# Patient Record
Sex: FEMALE | Race: ASIAN | Employment: OTHER | URBAN - METROPOLITAN AREA
[De-identification: names, ages, dates, MRNs, and addresses within clinical notes are randomized per-mention and may not be internally consistent; named-entity substitution may affect disease eponyms.]

---

## 2017-04-05 ENCOUNTER — APPOINTMENT (OUTPATIENT)
Dept: GENERAL RADIOLOGY | Facility: HOSPITAL | Age: 82
End: 2017-04-05
Attending: EMERGENCY MEDICINE
Payer: MEDICARE

## 2017-04-05 ENCOUNTER — HOSPITAL ENCOUNTER (OUTPATIENT)
Facility: HOSPITAL | Age: 82
Setting detail: OBSERVATION
Discharge: HOME | End: 2017-04-06
Attending: EMERGENCY MEDICINE | Admitting: HOSPITALIST
Payer: MEDICARE

## 2017-04-05 ENCOUNTER — APPOINTMENT (OUTPATIENT)
Dept: CV DIAGNOSTICS | Facility: HOSPITAL | Age: 82
End: 2017-04-05
Attending: HOSPITALIST
Payer: MEDICARE

## 2017-04-05 DIAGNOSIS — R77.8 ELEVATED TROPONIN: ICD-10-CM

## 2017-04-05 DIAGNOSIS — R55 SYNCOPE AND COLLAPSE: Primary | ICD-10-CM

## 2017-04-05 PROBLEM — R79.89 ELEVATED TROPONIN: Status: ACTIVE | Noted: 2017-04-05

## 2017-04-05 PROBLEM — Z91.81 AT RISK FOR FALLING: Status: ACTIVE | Noted: 2017-04-05

## 2017-04-05 PROCEDURE — 36415 COLL VENOUS BLD VENIPUNCTURE: CPT

## 2017-04-05 PROCEDURE — 84484 ASSAY OF TROPONIN QUANT: CPT | Performed by: HOSPITALIST

## 2017-04-05 PROCEDURE — 93306 TTE W/DOPPLER COMPLETE: CPT | Performed by: INTERNAL MEDICINE

## 2017-04-05 PROCEDURE — 99285 EMERGENCY DEPT VISIT HI MDM: CPT

## 2017-04-05 PROCEDURE — 80053 COMPREHEN METABOLIC PANEL: CPT | Performed by: EMERGENCY MEDICINE

## 2017-04-05 PROCEDURE — 84484 ASSAY OF TROPONIN QUANT: CPT | Performed by: EMERGENCY MEDICINE

## 2017-04-05 PROCEDURE — 93306 TTE W/DOPPLER COMPLETE: CPT

## 2017-04-05 PROCEDURE — 81003 URINALYSIS AUTO W/O SCOPE: CPT | Performed by: HOSPITALIST

## 2017-04-05 PROCEDURE — 71010 XR CHEST AP PORTABLE  (CPT=71010): CPT

## 2017-04-05 PROCEDURE — 93010 ELECTROCARDIOGRAM REPORT: CPT

## 2017-04-05 PROCEDURE — 85025 COMPLETE CBC W/AUTO DIFF WBC: CPT | Performed by: EMERGENCY MEDICINE

## 2017-04-05 PROCEDURE — 83735 ASSAY OF MAGNESIUM: CPT | Performed by: EMERGENCY MEDICINE

## 2017-04-05 PROCEDURE — 93005 ELECTROCARDIOGRAM TRACING: CPT

## 2017-04-05 RX ORDER — POTASSIUM CHLORIDE 20 MEQ/1
20 TABLET, EXTENDED RELEASE ORAL ONCE
Status: COMPLETED | OUTPATIENT
Start: 2017-04-05 | End: 2017-04-05

## 2017-04-05 RX ORDER — PYRIDOXINE HCL (VITAMIN B6) 50 MG
100 TABLET ORAL DAILY
COMMUNITY

## 2017-04-05 RX ORDER — POTASSIUM CHLORIDE 20 MEQ/1
40 TABLET, EXTENDED RELEASE ORAL EVERY 4 HOURS
Status: COMPLETED | OUTPATIENT
Start: 2017-04-05 | End: 2017-04-05

## 2017-04-05 RX ORDER — ONDANSETRON 2 MG/ML
4 INJECTION INTRAMUSCULAR; INTRAVENOUS EVERY 6 HOURS PRN
Status: DISCONTINUED | OUTPATIENT
Start: 2017-04-05 | End: 2017-04-06

## 2017-04-05 RX ORDER — AMLODIPINE BESYLATE 5 MG/1
5 TABLET ORAL EVERY EVENING
COMMUNITY
End: 2017-07-24

## 2017-04-05 RX ORDER — TRAMADOL HYDROCHLORIDE 50 MG/1
50 TABLET ORAL DAILY
Status: DISCONTINUED | OUTPATIENT
Start: 2017-04-05 | End: 2017-04-06

## 2017-04-05 RX ORDER — CETIRIZINE HYDROCHLORIDE 10 MG/1
5 TABLET ORAL DAILY
Status: DISCONTINUED | OUTPATIENT
Start: 2017-04-06 | End: 2017-04-06

## 2017-04-05 RX ORDER — AMLODIPINE BESYLATE 5 MG/1
5 TABLET ORAL DAILY
Status: DISCONTINUED | OUTPATIENT
Start: 2017-04-05 | End: 2017-04-06

## 2017-04-05 RX ORDER — PANTOPRAZOLE SODIUM 20 MG/1
20 TABLET, DELAYED RELEASE ORAL
Status: DISCONTINUED | OUTPATIENT
Start: 2017-04-06 | End: 2017-04-06

## 2017-04-05 RX ORDER — ACETAMINOPHEN 325 MG/1
650 TABLET ORAL EVERY 6 HOURS PRN
Status: DISCONTINUED | OUTPATIENT
Start: 2017-04-05 | End: 2017-04-06

## 2017-04-05 RX ORDER — POTASSIUM CHLORIDE 20 MEQ/1
40 TABLET, EXTENDED RELEASE ORAL ONCE
Status: DISCONTINUED | OUTPATIENT
Start: 2017-04-05 | End: 2017-04-05

## 2017-04-05 RX ORDER — ALBUTEROL SULFATE 90 UG/1
1 AEROSOL, METERED RESPIRATORY (INHALATION) EVERY 6 HOURS PRN
Status: DISCONTINUED | OUTPATIENT
Start: 2017-04-05 | End: 2017-04-06

## 2017-04-05 RX ORDER — LISINOPRIL 10 MG/1
20 TABLET ORAL DAILY
Status: DISCONTINUED | OUTPATIENT
Start: 2017-04-06 | End: 2017-04-06

## 2017-04-05 RX ORDER — LISINOPRIL 20 MG/1
20 TABLET ORAL DAILY
COMMUNITY
End: 2017-07-24

## 2017-04-05 NOTE — ED INITIAL ASSESSMENT (HPI)
Pt here with report of having a syncopal episode yesterday while sitting. Pt was caught and did not hit head.

## 2017-04-05 NOTE — ED PROVIDER NOTES
Patient Seen in: [facility] Emergency Department    History   Patient presents with:  Syncope (cardiovascular, neurologic)    Stated Complaint: syncope    HPI    The patient is an 22-year-old female with a history of multiple past medical problems, N/A 1/29/2016    Comment Procedure: CAUDAL;  Surgeon: Sukh Foy MD;  Location: [facility] FOR PAIN [facility]       Medications : AmLODIPine Besylate 5 MG Oral Tab,  Take 5 mg by mouth every evening.    Calcium Carbonate-Vitamin D 250-125 MG-UNIT lymphadenopathy. Mucus membranes moist.   Supple neck  Cardiovascular:    Regular rhythm without murmurs rubs or gallops, no peripheral edema or JVD. Radial and DP pulses 2+ bilaterally. Lungs: Speaking full sentences without any distress or retractions. Rhythm  Reading: Normal sinus rhythm with frequent PVCs, no other ectopy, normal axis, there is a QRS duration of 146 ms, with right bundle branch block, otherwise normal intervals, no pathologic ST segment elevation or depressions or T-wave inversions.   Julius Youssef Date Reviewed: 10/17/2016          ICD-10-CM Noted POA    * (Principal)Syncope and collapse R55 4/5/2017 Unknown    Elevated troponin R74.8 4/5/2017 Unknown

## 2017-04-05 NOTE — ED NOTES
Report given to floor RN Shayna, patient and son updated on bed assignment, patient awaiting transportation.

## 2017-04-05 NOTE — CONSULTS
[facility] Electrophysiology Consult      Servando Monroe is a 80year old female    She has a h/o recurrent syncope. In 2013, she and her family were considering a comprehensive workup involving cardiac MRI, EPS, but she ultimately declined due to her advnaced age. 84 , RBBB, QTc 500, PVCs  · Echo: 3/2013: EF 70  · Echo today pending  · Nuc stress: 3/2013: normal perfusion and EF     Impression:  1. Syncope  palpitations   RBBB    Plan:  · Discussed options with her and son.  Essentially, they can decide if they

## 2017-04-05 NOTE — H&P
.  CC: Patient presents with:  Syncope (cardiovascular, neurologic)       PCP: Aubrey Ojeda MD    History of Present Illness: Patient is a 80year old female with PMH sig for HTN who presented with episode of syncope while sitting yesterday with pylori) 1998     treated   • Vitamin D deficiency 2/21/2010        PSH      Past Surgical History    UPPER GI ENDOSCOPY,DIAGNOSIS  12/01    Comment wnl    COLONOSCOPY,DIAGNOSTIC  12/01    Comment wnl    INJECTION, W/WO CONTRAST, DX/THERAPEUTIC SUBSTANCE, E CL  103   CO2  30.0   BUN  19   CREATSERUM  1.00   GLU  151*   CA  8.9   MG  1.9       Recent Labs   Lab  04/05/17   1115   ALT  18   AST  15   ALB  3.3*       Recent Labs   Lab  04/05/17   1115   TROP  0.049*       Additional Diagnostics: personally rev

## 2017-04-05 NOTE — ED NOTES
Per nursing supervisor, bed assignment to be placed, previous room not available. Patient and family notified.

## 2017-04-05 NOTE — PROGRESS NOTES
[facility] Pharmacy Note:  Renal Dose Adjustment for Cetirizine (ZYRTEC)    Ritu Wells has been prescribed Cetirizine (Zyrtec) 10 mg orally daily.     CrCl estimated at 26.9 ml/min     Her calculated creatinine clearance is < 32 ml/min, therefore the dose has bee

## 2017-04-06 VITALS
WEIGHT: 95 LBS | OXYGEN SATURATION: 94 % | RESPIRATION RATE: 20 BRPM | BODY MASS INDEX: 21 KG/M2 | TEMPERATURE: 98 F | DIASTOLIC BLOOD PRESSURE: 49 MMHG | SYSTOLIC BLOOD PRESSURE: 105 MMHG | HEART RATE: 76 BPM

## 2017-04-06 PROCEDURE — 97161 PT EVAL LOW COMPLEX 20 MIN: CPT

## 2017-04-06 PROCEDURE — 84132 ASSAY OF SERUM POTASSIUM: CPT | Performed by: HOSPITALIST

## 2017-04-06 NOTE — PROGRESS NOTES
[facility] Hospitalist note    PCP: Mela Roy MD    Chief Complaint:  79 yo woman with syncope and h/o NSVT    SUBJECTIVE:  No further events, no dizziness. Ambulated and feels better.  Tolerating PO  Son reports that pt/family do not want further Pantoprazole Sodium  20 mg Oral QAM AC   • TraMADol HCl  50 mg Oral Daily        acetaminophen, ondansetron HCl, Albuterol Sulfate HFA       Assessment/Plan:     Syncope- during previous episodes there have been concerns for seizure, NSVT as causes.  Could

## 2017-04-06 NOTE — CM/SW NOTE
SW met with patient to assess for discharge planning needs. Patient is [place] speaking only, therefore her son Ruben Husain ([phone]) provided history necessary for this assessment.   Ruben Husain stated that patient lives with her daughter and son-in-law in [facility] Admission   Independent with ADLs and Mobility Yes   Services in place prior to admission DME/Supplies at home   Type of DME/Supplies Wheeled Walker;Standard Walker; Wheelchair   Discharge Needs   Anticipated D/C needs Other  (24 hour care/supervision)

## 2017-04-06 NOTE — PAYOR COMM NOTE
4/5      ED     Patient presents with:  Syncope (cardiovascular, neurologic)    Stated Complaint: syncope    HPI    The patient is an 45-year-old female with a history of multiple past medical problems, presenting with her son to the emergency department d CBC W/ DIFFERENTIAL - Abnormal; Notable for the following:     HGB 11.7 (*)     MCV 76.8 (*)     MCH 24.9 (*)     All other components within normal limits   MAGNESIUM - Normal   POTASSIUM - Normal   CBC WITH DIFFERENTIAL WITH PLATELET    Narrative:

## 2017-04-06 NOTE — CM/SW NOTE
SW received callback from patient's daughter, Anel Sales. Jacky stated the home care company patient has used in the past is [address] (P: [phone]), (F: [phone]). ARMANDO faxed referral to [facility], as they are not on [facility].

## 2017-04-06 NOTE — PHYSICAL THERAPY NOTE
PHYSICAL THERAPY QUICK EVALUATION - INPATIENT    Room Number: 3570/7869-S  Evaluation Date: 4/6/2017  Presenting Problem: syncope and collapse, mildly elevated troponin  Physician Order: PT Eval and Treat    Problem List  Principal Problem:    Syncope and walker (rollator)  Patient Regularly Uses: Glasses    Prior Level of [place]: Ambulates with rollator or RW, denies recent fall history, independent c ADLs, family assists with cooking. SUBJECTIVE  \" I am happy because I am going home. \"    OBJECT gait pattern and without LOB during turns, head turns, or changes in gait speed. Pt attempted 5 steps sans AD requiring Min A and cues. Pt recommended to utilize RW at all times which patient's son confirms that she does.   Pt completed modified rahman balanc

## 2017-04-07 NOTE — CM/SW NOTE
Pt d/c 04/06 home with [facility].        04/07/17 0900   Discharge disposition   Discharged to: Home or Self   Name of Facillity/Home Care/Hospice ([address])   Home services after discharge Skilled home care   Discharge

## 2017-04-07 NOTE — CM/SW NOTE
ARMANDO confirmed with [facility] (P#[phone]) they have accepted patient for home health, SW faxed updates (U#[phone]).

## 2017-08-07 PROBLEM — H54.40 BLIND LEFT EYE: Status: ACTIVE | Noted: 2017-08-07

## 2017-08-07 PROBLEM — E11.9 DIABETES MELLITUS TYPE 2 WITHOUT RETINOPATHY (HCC): Status: ACTIVE | Noted: 2017-08-07

## 2017-08-07 PROBLEM — H25.811 COMBINED FORMS OF AGE-RELATED CATARACT OF RIGHT EYE: Status: ACTIVE | Noted: 2017-08-07

## 2017-09-08 PROBLEM — H44.522 PHTHISIS BULBI OF LEFT EYE: Status: ACTIVE | Noted: 2017-09-08

## 2018-03-10 PROCEDURE — 82570 ASSAY OF URINE CREATININE: CPT | Performed by: INTERNAL MEDICINE

## 2018-03-10 PROCEDURE — 82043 UR ALBUMIN QUANTITATIVE: CPT | Performed by: INTERNAL MEDICINE

## 2019-07-04 ENCOUNTER — HOSPITAL ENCOUNTER (EMERGENCY)
Facility: HOSPITAL | Age: 84
Discharge: HOME | End: 2019-07-04
Attending: EMERGENCY MEDICINE
Payer: MEDICARE

## 2019-07-04 VITALS
TEMPERATURE: 97 F | DIASTOLIC BLOOD PRESSURE: 50 MMHG | SYSTOLIC BLOOD PRESSURE: 146 MMHG | HEART RATE: 72 BPM | WEIGHT: 98.13 LBS | OXYGEN SATURATION: 96 % | BODY MASS INDEX: 21.17 KG/M2 | RESPIRATION RATE: 16 BRPM | HEIGHT: 57 IN

## 2019-07-04 DIAGNOSIS — R55 SYNCOPE, NEAR: Primary | ICD-10-CM

## 2019-07-04 LAB
ALBUMIN SERPL-MCNC: 3.6 G/DL (ref 3.4–5)
ALBUMIN/GLOB SERPL: 1.3 {RATIO} (ref 1–2)
ALP LIVER SERPL-CCNC: 102 U/L (ref 55–142)
ALT SERPL-CCNC: 17 U/L (ref 13–56)
ANION GAP SERPL CALC-SCNC: 6 MMOL/L (ref 0–18)
AST SERPL-CCNC: 20 U/L (ref 15–37)
BASOPHILS # BLD AUTO: 0.04 X10(3) UL (ref 0–0.2)
BASOPHILS NFR BLD AUTO: 0.5 %
BILIRUB SERPL-MCNC: 0.9 MG/DL (ref 0.1–2)
BUN BLD-MCNC: 28 MG/DL (ref 7–18)
BUN/CREAT SERPL: 23.3 (ref 10–20)
CALCIUM BLD-MCNC: 8.7 MG/DL (ref 8.5–10.1)
CHLORIDE SERPL-SCNC: 104 MMOL/L (ref 98–112)
CO2 SERPL-SCNC: 30 MMOL/L (ref 21–32)
CREAT BLD-MCNC: 1.2 MG/DL (ref 0.55–1.02)
DEPRECATED RDW RBC AUTO: 37.7 FL (ref 35.1–46.3)
EOSINOPHIL # BLD AUTO: 0.16 X10(3) UL (ref 0–0.7)
EOSINOPHIL NFR BLD AUTO: 2.2 %
ERYTHROCYTE [DISTWIDTH] IN BLOOD BY AUTOMATED COUNT: 13.4 % (ref 11–15)
GLOBULIN PLAS-MCNC: 2.8 G/DL (ref 2.8–4.4)
GLUCOSE BLD-MCNC: 111 MG/DL (ref 70–99)
HCT VFR BLD AUTO: 37.5 % (ref 35–48)
HGB BLD-MCNC: 12 G/DL (ref 12–16)
IMM GRANULOCYTES # BLD AUTO: 0.02 X10(3) UL (ref 0–1)
IMM GRANULOCYTES NFR BLD: 0.3 %
LYMPHOCYTES # BLD AUTO: 2.15 X10(3) UL (ref 1–4)
LYMPHOCYTES NFR BLD AUTO: 29 %
M PROTEIN MFR SERPL ELPH: 6.4 G/DL (ref 6.4–8.2)
MCH RBC QN AUTO: 25 PG (ref 26–34)
MCHC RBC AUTO-ENTMCNC: 32 G/DL (ref 31–37)
MCV RBC AUTO: 78.1 FL (ref 80–100)
MONOCYTES # BLD AUTO: 0.61 X10(3) UL (ref 0.1–1)
MONOCYTES NFR BLD AUTO: 8.2 %
NEUTROPHILS # BLD AUTO: 4.43 X10 (3) UL (ref 1.5–7.7)
NEUTROPHILS # BLD AUTO: 4.43 X10(3) UL (ref 1.5–7.7)
NEUTROPHILS NFR BLD AUTO: 59.8 %
OSMOLALITY SERPL CALC.SUM OF ELEC: 296 MOSM/KG (ref 275–295)
PLATELET # BLD AUTO: 201 10(3)UL (ref 150–450)
POTASSIUM SERPL-SCNC: 3.5 MMOL/L (ref 3.5–5.1)
RBC # BLD AUTO: 4.8 X10(6)UL (ref 3.8–5.3)
SODIUM SERPL-SCNC: 140 MMOL/L (ref 136–145)
WBC # BLD AUTO: 7.4 X10(3) UL (ref 4–11)

## 2019-07-04 PROCEDURE — 85025 COMPLETE CBC W/AUTO DIFF WBC: CPT | Performed by: EMERGENCY MEDICINE

## 2019-07-04 PROCEDURE — 93010 ELECTROCARDIOGRAM REPORT: CPT

## 2019-07-04 PROCEDURE — 99284 EMERGENCY DEPT VISIT MOD MDM: CPT

## 2019-07-04 PROCEDURE — 36415 COLL VENOUS BLD VENIPUNCTURE: CPT

## 2019-07-04 PROCEDURE — 80053 COMPREHEN METABOLIC PANEL: CPT | Performed by: EMERGENCY MEDICINE

## 2019-07-04 PROCEDURE — 93005 ELECTROCARDIOGRAM TRACING: CPT

## 2019-07-04 NOTE — ED PROVIDER NOTES
Patient Seen in: [facility] Emergency Department    History   Patient presents with:  Syncope (cardiovascular, neurologic)    Stated Complaint: near syncope    HPI    Patient is a 72-year-old female who presents via EMS with her daughter at bedside complaint: near syncope  Other systems are as noted in HPI. Constitutional and vital signs reviewed. All other systems reviewed and negative except as noted above.     Physical Exam     ED Triage Vitals [07/04/19 1103]   /54   Pulse 62   Resp 16 Abnormality         Status                     ---------                               -----------         ------                     CBC W/ DIFFERENTIAL[[id number]]          Abnormal            Final result                 Please view results for these

## 2019-07-04 NOTE — ED INITIAL ASSESSMENT (HPI)
Pt aox4. Pt presents to ed per [facility] and daughter. Per pts daughter pt laid her head down and stated she was tired, pt closed her eyes and patient was responding slowly. Per daughter pt never passed out.

## 2019-07-05 LAB
ATRIAL RATE: 64 BPM
P AXIS: 61 DEGREES
P-R INTERVAL: 212 MS
Q-T INTERVAL: 456 MS
QRS DURATION: 140 MS
QTC CALCULATION (BEZET): 470 MS
R AXIS: 72 DEGREES
T AXIS: 0 DEGREES
VENTRICULAR RATE: 64 BPM

## 2019-07-31 ENCOUNTER — HOSPITAL ENCOUNTER (EMERGENCY)
Facility: HOSPITAL | Age: 84
Discharge: HOME | End: 2019-07-31
Attending: EMERGENCY MEDICINE
Payer: MEDICARE

## 2019-07-31 VITALS
OXYGEN SATURATION: 100 % | SYSTOLIC BLOOD PRESSURE: 150 MMHG | HEIGHT: 57 IN | RESPIRATION RATE: 18 BRPM | HEART RATE: 60 BPM | TEMPERATURE: 98 F | BODY MASS INDEX: 21.17 KG/M2 | WEIGHT: 98.13 LBS | DIASTOLIC BLOOD PRESSURE: 57 MMHG

## 2019-07-31 DIAGNOSIS — I10 ESSENTIAL HYPERTENSION: Primary | ICD-10-CM

## 2019-07-31 LAB
ALBUMIN SERPL-MCNC: 3.9 G/DL (ref 3.4–5)
ALBUMIN/GLOB SERPL: 1.1 {RATIO} (ref 1–2)
ALP LIVER SERPL-CCNC: 123 U/L (ref 55–142)
ALT SERPL-CCNC: 19 U/L (ref 13–56)
ANION GAP SERPL CALC-SCNC: 4 MMOL/L (ref 0–18)
AST SERPL-CCNC: 21 U/L (ref 15–37)
BILIRUB SERPL-MCNC: 0.8 MG/DL (ref 0.1–2)
BUN BLD-MCNC: 16 MG/DL (ref 7–18)
BUN/CREAT SERPL: 17.4 (ref 10–20)
CALCIUM BLD-MCNC: 10 MG/DL (ref 8.5–10.1)
CHLORIDE SERPL-SCNC: 102 MMOL/L (ref 98–112)
CO2 SERPL-SCNC: 32 MMOL/L (ref 21–32)
CREAT BLD-MCNC: 0.92 MG/DL (ref 0.55–1.02)
GLOBULIN PLAS-MCNC: 3.5 G/DL (ref 2.8–4.4)
GLUCOSE BLD-MCNC: 90 MG/DL (ref 70–99)
M PROTEIN MFR SERPL ELPH: 7.4 G/DL (ref 6.4–8.2)
OSMOLALITY SERPL CALC.SUM OF ELEC: 287 MOSM/KG (ref 275–295)
POTASSIUM SERPL-SCNC: 4 MMOL/L (ref 3.5–5.1)
SODIUM SERPL-SCNC: 138 MMOL/L (ref 136–145)

## 2019-07-31 PROCEDURE — 36415 COLL VENOUS BLD VENIPUNCTURE: CPT

## 2019-07-31 PROCEDURE — 99283 EMERGENCY DEPT VISIT LOW MDM: CPT

## 2019-07-31 PROCEDURE — 80053 COMPREHEN METABOLIC PANEL: CPT | Performed by: EMERGENCY MEDICINE

## 2019-07-31 PROCEDURE — 99284 EMERGENCY DEPT VISIT MOD MDM: CPT

## 2019-07-31 RX ORDER — CLONIDINE HYDROCHLORIDE 0.1 MG/1
0.1 TABLET ORAL 2 TIMES DAILY
Qty: 60 TABLET | Refills: 0 | Status: SHIPPED | OUTPATIENT
Start: 2019-07-31 | End: 2019-08-05

## 2019-07-31 RX ORDER — CLONIDINE HYDROCHLORIDE 0.1 MG/1
0.1 TABLET ORAL ONCE
Status: COMPLETED | OUTPATIENT
Start: 2019-07-31 | End: 2019-07-31

## 2019-08-01 NOTE — ED PROVIDER NOTES
Patient Seen in: [facility] Emergency Department    History   Patient presents with:  Hypertension (cardiovascular)    Stated Complaint: hypertension from IC    HPI    Patient is a 55-year-old female who presents from the immediate care for high blo 07/31/19 1759 68   Resp 07/31/19 1809 20   Temp 07/31/19 1809 97.9 °F (36.6 °C)   Temp src --    SpO2 07/31/19 1759 98 %   O2 Device 07/31/19 1809 None (Room air)       Current:/57   Pulse 60   Temp 97.9 °F (36.6 °C)   Resp 18   Ht 144.8 cm (4' 9\") days          Medications Prescribed:  Discharge Medication List as of 7/31/2019  7:23 PM    START taking these medications    cloNIDine HCl 0.1 MG Oral Tab  Take 1 tablet (0.1 mg total) by mouth 2 (two) times daily. , Normal, Disp-60 tablet, R-0

## 2019-08-05 PROBLEM — K86.89 PANCREATIC MASS: Status: ACTIVE | Noted: 2019-07-02

## 2019-08-05 PROBLEM — K86.89 PANCREATIC MASS (HCC): Status: ACTIVE | Noted: 2019-07-02

## 2019-08-05 PROBLEM — R19.00 PELVIC MASS: Status: ACTIVE | Noted: 2019-07-02

## 2019-08-10 ENCOUNTER — APPOINTMENT (OUTPATIENT)
Dept: CT IMAGING | Facility: HOSPITAL | Age: 84
End: 2019-08-10
Attending: EMERGENCY MEDICINE
Payer: MEDICARE

## 2019-08-10 ENCOUNTER — APPOINTMENT (OUTPATIENT)
Dept: GENERAL RADIOLOGY | Facility: HOSPITAL | Age: 84
End: 2019-08-10
Attending: EMERGENCY MEDICINE
Payer: MEDICARE

## 2019-08-10 ENCOUNTER — HOSPITAL ENCOUNTER (OUTPATIENT)
Facility: HOSPITAL | Age: 84
Setting detail: OBSERVATION
Discharge: HOME HEALTH | End: 2019-08-13
Attending: EMERGENCY MEDICINE | Admitting: INTERNAL MEDICINE
Payer: MEDICARE

## 2019-08-10 DIAGNOSIS — R77.8 ELEVATED TROPONIN: ICD-10-CM

## 2019-08-10 DIAGNOSIS — R53.1 WEAKNESS GENERALIZED: Primary | ICD-10-CM

## 2019-08-10 PROBLEM — R79.89 AZOTEMIA: Status: ACTIVE | Noted: 2019-08-10

## 2019-08-10 PROBLEM — R73.9 HYPERGLYCEMIA: Status: ACTIVE | Noted: 2019-08-10

## 2019-08-10 LAB
ALBUMIN SERPL-MCNC: 4.1 G/DL (ref 3.4–5)
ALBUMIN/GLOB SERPL: 1.2 {RATIO} (ref 1–2)
ALP LIVER SERPL-CCNC: 112 U/L (ref 55–142)
ALT SERPL-CCNC: 20 U/L (ref 13–56)
ANION GAP SERPL CALC-SCNC: 5 MMOL/L (ref 0–18)
AST SERPL-CCNC: 19 U/L (ref 15–37)
BILIRUB SERPL-MCNC: 1.1 MG/DL (ref 0.1–2)
BILIRUB UR QL STRIP.AUTO: NEGATIVE
BUN BLD-MCNC: 20 MG/DL (ref 7–18)
BUN/CREAT SERPL: 22.7 (ref 10–20)
CALCIUM BLD-MCNC: 9.3 MG/DL (ref 8.5–10.1)
CHLORIDE SERPL-SCNC: 105 MMOL/L (ref 98–112)
CLARITY UR REFRACT.AUTO: CLEAR
CO2 SERPL-SCNC: 29 MMOL/L (ref 21–32)
COLOR UR AUTO: YELLOW
CREAT BLD-MCNC: 0.88 MG/DL (ref 0.55–1.02)
GLOBULIN PLAS-MCNC: 3.3 G/DL (ref 2.8–4.4)
GLUCOSE BLD-MCNC: 116 MG/DL (ref 70–99)
GLUCOSE BLD-MCNC: 118 MG/DL (ref 70–99)
GLUCOSE UR STRIP.AUTO-MCNC: NEGATIVE MG/DL
KETONES UR STRIP.AUTO-MCNC: NEGATIVE MG/DL
LACTATE SERPL-SCNC: 0.8 MMOL/L (ref 0.4–2)
LEUKOCYTE ESTERASE UR QL STRIP.AUTO: NEGATIVE
M PROTEIN MFR SERPL ELPH: 7.4 G/DL (ref 6.4–8.2)
NITRITE UR QL STRIP.AUTO: NEGATIVE
OSMOLALITY SERPL CALC.SUM OF ELEC: 292 MOSM/KG (ref 275–295)
PH UR STRIP.AUTO: 8 [PH] (ref 4.5–8)
POTASSIUM SERPL-SCNC: 4 MMOL/L (ref 3.5–5.1)
PROCALCITONIN SERPL-MCNC: <0.02 NG/ML
PROT UR STRIP.AUTO-MCNC: 30 MG/DL
RBC UR QL AUTO: NEGATIVE
SODIUM SERPL-SCNC: 139 MMOL/L (ref 136–145)
SP GR UR STRIP.AUTO: 1.01 (ref 1–1.03)
TROPONIN I SERPL-MCNC: 0.61 NG/ML (ref ?–0.04)
UROBILINOGEN UR STRIP.AUTO-MCNC: <2 MG/DL

## 2019-08-10 PROCEDURE — 83605 ASSAY OF LACTIC ACID: CPT | Performed by: EMERGENCY MEDICINE

## 2019-08-10 PROCEDURE — 93010 ELECTROCARDIOGRAM REPORT: CPT

## 2019-08-10 PROCEDURE — 96374 THER/PROPH/DIAG INJ IV PUSH: CPT

## 2019-08-10 PROCEDURE — 80053 COMPREHEN METABOLIC PANEL: CPT | Performed by: EMERGENCY MEDICINE

## 2019-08-10 PROCEDURE — 82962 GLUCOSE BLOOD TEST: CPT

## 2019-08-10 PROCEDURE — 70450 CT HEAD/BRAIN W/O DYE: CPT | Performed by: EMERGENCY MEDICINE

## 2019-08-10 PROCEDURE — 36415 COLL VENOUS BLD VENIPUNCTURE: CPT

## 2019-08-10 PROCEDURE — 93005 ELECTROCARDIOGRAM TRACING: CPT

## 2019-08-10 PROCEDURE — 84145 PROCALCITONIN (PCT): CPT | Performed by: EMERGENCY MEDICINE

## 2019-08-10 PROCEDURE — 96361 HYDRATE IV INFUSION ADD-ON: CPT

## 2019-08-10 PROCEDURE — 87040 BLOOD CULTURE FOR BACTERIA: CPT | Performed by: EMERGENCY MEDICINE

## 2019-08-10 PROCEDURE — 71045 X-RAY EXAM CHEST 1 VIEW: CPT | Performed by: EMERGENCY MEDICINE

## 2019-08-10 PROCEDURE — 81001 URINALYSIS AUTO W/SCOPE: CPT | Performed by: EMERGENCY MEDICINE

## 2019-08-10 PROCEDURE — 99285 EMERGENCY DEPT VISIT HI MDM: CPT

## 2019-08-10 PROCEDURE — 84484 ASSAY OF TROPONIN QUANT: CPT | Performed by: EMERGENCY MEDICINE

## 2019-08-10 RX ORDER — HYDRALAZINE HYDROCHLORIDE 20 MG/ML
5 INJECTION INTRAMUSCULAR; INTRAVENOUS ONCE
Status: COMPLETED | OUTPATIENT
Start: 2019-08-10 | End: 2019-08-10

## 2019-08-10 RX ORDER — METOPROLOL SUCCINATE 25 MG/1
25 TABLET, EXTENDED RELEASE ORAL
Status: DISCONTINUED | OUTPATIENT
Start: 2019-08-11 | End: 2019-08-11

## 2019-08-10 RX ORDER — ASPIRIN 81 MG/1
81 TABLET, CHEWABLE ORAL DAILY
Status: DISCONTINUED | OUTPATIENT
Start: 2019-08-11 | End: 2019-08-13

## 2019-08-10 RX ORDER — ACETAMINOPHEN 325 MG/1
650 TABLET ORAL EVERY 6 HOURS PRN
Status: DISCONTINUED | OUTPATIENT
Start: 2019-08-10 | End: 2019-08-13

## 2019-08-10 RX ORDER — ALBUTEROL SULFATE 90 UG/1
1 AEROSOL, METERED RESPIRATORY (INHALATION) EVERY 6 HOURS PRN
Status: DISCONTINUED | OUTPATIENT
Start: 2019-08-10 | End: 2019-08-13

## 2019-08-10 RX ORDER — LISINOPRIL 10 MG/1
10 TABLET ORAL 2 TIMES DAILY
Status: DISCONTINUED | OUTPATIENT
Start: 2019-08-10 | End: 2019-08-13

## 2019-08-10 RX ORDER — PANTOPRAZOLE SODIUM 20 MG/1
20 TABLET, DELAYED RELEASE ORAL
Status: DISCONTINUED | OUTPATIENT
Start: 2019-08-11 | End: 2019-08-13

## 2019-08-10 RX ORDER — LABETALOL HYDROCHLORIDE 5 MG/ML
20 INJECTION, SOLUTION INTRAVENOUS EVERY 4 HOURS PRN
Status: DISCONTINUED | OUTPATIENT
Start: 2019-08-10 | End: 2019-08-11

## 2019-08-10 NOTE — ED INITIAL ASSESSMENT (HPI)
HTN, lethargy today. Seems out of it per son and daughter. Recently Tapered clonodine and start hydralazine. Started new medication last night. Up all night last night. Called son this am and stated she couldn't get up. bp 179/75.   Nephrologist master

## 2019-08-10 NOTE — ED PROVIDER NOTES
Patient Seen in: [facility] Emergency Department    History   Patient presents with:  Hypertension (cardiovascular)    Stated Complaint: HTN    HPI    15-year-old female who was brought to the emergency room because of difficulty with generalized we COLONOSCOPY,DIAGNOSTIC  12/01    wnl   • UPPER GI ENDOSCOPY,DIAGNOSIS  12/01    wnl                    Social History    Tobacco Use      Smoking status: Never Smoker      Smokeless tobacco: Never Used    Alcohol use: No    Drug use: Never             Revi All other components within normal limits   LACTIC ACID, PLASMA - Normal   PROCALCITONIN - Normal    Narrative:     Resulted by: batch: K, CO2, CL, NA, ALB, TP, TBIL, ALT, AST, ALP, CA, CREA, BUN, GLUC,           Resulted by: [id number]: CTNI,    CBC WITH work-up for her generalized weakness and elevated troponin.   CT brain is pending at this time              Disposition and Plan     Clinical Impression:  Weakness generalized  (primary encounter diagnosis)  Elevated troponin    Disposition:  There is no di

## 2019-08-11 ENCOUNTER — APPOINTMENT (OUTPATIENT)
Dept: CV DIAGNOSTICS | Facility: HOSPITAL | Age: 84
End: 2019-08-11
Attending: INTERNAL MEDICINE
Payer: MEDICARE

## 2019-08-11 ENCOUNTER — APPOINTMENT (OUTPATIENT)
Dept: GENERAL RADIOLOGY | Facility: HOSPITAL | Age: 84
End: 2019-08-11
Attending: INTERNAL MEDICINE
Payer: MEDICARE

## 2019-08-11 LAB
ANION GAP SERPL CALC-SCNC: 5 MMOL/L (ref 0–18)
BUN BLD-MCNC: 18 MG/DL (ref 7–18)
BUN/CREAT SERPL: 22.8 (ref 10–20)
CALCIUM BLD-MCNC: 8.3 MG/DL (ref 8.5–10.1)
CHLORIDE SERPL-SCNC: 108 MMOL/L (ref 98–112)
CO2 SERPL-SCNC: 27 MMOL/L (ref 21–32)
CREAT BLD-MCNC: 0.79 MG/DL (ref 0.55–1.02)
DEPRECATED RDW RBC AUTO: 38.3 FL (ref 35.1–46.3)
ERYTHROCYTE [DISTWIDTH] IN BLOOD BY AUTOMATED COUNT: 13.6 % (ref 11–15)
GLUCOSE BLD-MCNC: 112 MG/DL (ref 70–99)
HAV IGM SER QL: 2.1 MG/DL (ref 1.6–2.6)
HCT VFR BLD AUTO: 40 % (ref 35–48)
HGB BLD-MCNC: 12.8 G/DL (ref 12–16)
MCH RBC QN AUTO: 24.7 PG (ref 26–34)
MCHC RBC AUTO-ENTMCNC: 32 G/DL (ref 31–37)
MCV RBC AUTO: 77.2 FL (ref 80–100)
NT-PROBNP SERPL-MCNC: 2902 PG/ML (ref ?–450)
OSMOLALITY SERPL CALC.SUM OF ELEC: 293 MOSM/KG (ref 275–295)
PLATELET # BLD AUTO: 204 10(3)UL (ref 150–450)
POTASSIUM SERPL-SCNC: 3.5 MMOL/L (ref 3.5–5.1)
POTASSIUM SERPL-SCNC: 4.2 MMOL/L (ref 3.5–5.1)
RBC # BLD AUTO: 5.18 X10(6)UL (ref 3.8–5.3)
SODIUM SERPL-SCNC: 140 MMOL/L (ref 136–145)
TROPONIN I SERPL-MCNC: 2.29 NG/ML (ref ?–0.04)
TROPONIN I SERPL-MCNC: 2.7 NG/ML (ref ?–0.04)
WBC # BLD AUTO: 8.4 X10(3) UL (ref 4–11)

## 2019-08-11 PROCEDURE — 84132 ASSAY OF SERUM POTASSIUM: CPT | Performed by: INTERNAL MEDICINE

## 2019-08-11 PROCEDURE — 93005 ELECTROCARDIOGRAM TRACING: CPT

## 2019-08-11 PROCEDURE — 93306 TTE W/DOPPLER COMPLETE: CPT | Performed by: INTERNAL MEDICINE

## 2019-08-11 PROCEDURE — 83735 ASSAY OF MAGNESIUM: CPT | Performed by: INTERNAL MEDICINE

## 2019-08-11 PROCEDURE — 84484 ASSAY OF TROPONIN QUANT: CPT | Performed by: INTERNAL MEDICINE

## 2019-08-11 PROCEDURE — 85027 COMPLETE CBC AUTOMATED: CPT | Performed by: INTERNAL MEDICINE

## 2019-08-11 PROCEDURE — 73630 X-RAY EXAM OF FOOT: CPT | Performed by: INTERNAL MEDICINE

## 2019-08-11 PROCEDURE — 80048 BASIC METABOLIC PNL TOTAL CA: CPT | Performed by: INTERNAL MEDICINE

## 2019-08-11 PROCEDURE — 83880 ASSAY OF NATRIURETIC PEPTIDE: CPT | Performed by: INTERNAL MEDICINE

## 2019-08-11 PROCEDURE — 93010 ELECTROCARDIOGRAM REPORT: CPT | Performed by: INTERNAL MEDICINE

## 2019-08-11 RX ORDER — HYDRALAZINE HYDROCHLORIDE 25 MG/1
25 TABLET, FILM COATED ORAL 3 TIMES DAILY PRN
Status: DISCONTINUED | OUTPATIENT
Start: 2019-08-11 | End: 2019-08-13

## 2019-08-11 RX ORDER — CARVEDILOL 6.25 MG/1
6.25 TABLET ORAL 2 TIMES DAILY WITH MEALS
Status: DISCONTINUED | OUTPATIENT
Start: 2019-08-11 | End: 2019-08-11

## 2019-08-11 RX ORDER — CARVEDILOL 12.5 MG/1
12.5 TABLET ORAL 2 TIMES DAILY WITH MEALS
Status: DISCONTINUED | OUTPATIENT
Start: 2019-08-11 | End: 2019-08-12

## 2019-08-11 RX ORDER — POTASSIUM CHLORIDE 20 MEQ/1
40 TABLET, EXTENDED RELEASE ORAL EVERY 4 HOURS
Status: COMPLETED | OUTPATIENT
Start: 2019-08-11 | End: 2019-08-11

## 2019-08-11 RX ORDER — CLOPIDOGREL BISULFATE 75 MG/1
75 TABLET ORAL DAILY
Status: DISCONTINUED | OUTPATIENT
Start: 2019-08-11 | End: 2019-08-13

## 2019-08-11 NOTE — CONSULTS
[facility]  Report of Consultation    Ruben Marina Patient Status:  Observation    6/15/1929 MRN [id number]   [facility] 7NE-A Attending Benedicto Mckeon MD   Hosp Day # 0 PCP Rhona Burton MD     Reason for Consultation:  +tr continued ASA, added Plavix. BP control will be key. HTN-pt is newly est with nephrology for HTN management and will consult them to assist in BP control while inpatient. Weakness, presyncope-has been recurrent throughout the years.   Interesting larissa diaphoresis. EKGs showed possible diffuse ischemia with less than 1mm ST depression but no ST elevation. Repeat troponin 2.2 so I was consulted.     History:  Past Medical History:   Diagnosis Date   • Arrhythmia     palpitations   • Asthma    • Benign ne (Toprol XL) 24 hr tab 25 mg, 25 mg, Oral, Daily Beta Blocker  •  Pantoprazole Sodium (PROTONIX) EC tab 20 mg, 20 mg, Oral, QAM AC    Review of Systems:  All systems were reviewed and are negative except as described above in HPI.     Physical Exam:  Temp: Alistair Karimi MD  8/11/2019  9:59 AM

## 2019-08-11 NOTE — PLAN OF CARE
Patient awake, alert and oriented x4  [place] speaking;  services offered; patient declines, prefers to use family at bedside; currently son at bedside  Telemetry, Sinus Rhythm, 1AVB, BBB; frequent PVCs  C/o generalized weakness/fatigue  C/o

## 2019-08-11 NOTE — PLAN OF CARE
NURSING ADMISSION NOTE      Patient admitted via cart @ [phone] 75. Oriented to room. Safety precautions initiated. Bed in low position. Call light in reach. Patient A&Ox4. [place] speaking, son at bedside to translate. Denies pain.   BP still elev

## 2019-08-11 NOTE — CONSULTS
[facility] - Nephrology  Report of Consultation    Elba Villasenor Patient Status:  Observation    6/15/1929 MRN [id number]   [facility] 7NE-A Attending Mary Blevins MD   Hosp Day # 0 PCP Roberta Burris MD     Reason for UPPER GI ENDOSCOPY,DIAGNOSIS  12/01    wnl     Family History   Problem Relation Age of Onset   • Heart Disorder Mother         heart failure   • Cancer Sister         colon cancer age 80      reports that she has never smoked.  She has never used smokeless ) Disp: 90 tablet Rfl: 3 8/10/2019 at Unknown time   ADVAIR DISKUS 500-50 MCG/DOSE Inhalation Aerosol Powder, Breath Activated INHALE 1 PUFF INTO THE LUNGS TWICE DAILY Disp: 180 each Rfl: 1 8/10/2019 at Unknown time   METOPROLOL SUCCINATE ER 25 MG Oral Tab ml     Wt Readings from Last 3 Encounters:  08/10/19 : 95 lb 14.4 oz (43.5 kg)  08/09/19 : 96 lb (43.5 kg)  08/05/19 : 96 lb (43.5 kg)      General: pleasant, well appearing  HEENT: NCAT  Neck: Supple  Cardiac: Regular rate and rhythm, no murmurs  Lungs: C UROBILINOGEN <2.0 08/10/2019    NITRITE Negative 08/10/2019    LEUUR Negative 08/10/2019    NMIC Microscopic not indicated 04/05/2017    WBCUR 1-4 08/10/2019    RBCUR 0-2 08/10/2019    EPIUR None Seen 08/10/2019    BACUR None Seen 08/10/2019         ASSESS

## 2019-08-11 NOTE — H&P
[facility] Hospitalist History and Physical      Patient presents with:  Hypertension (cardiovascular)       PCP: Dick Otero MD      History of Present Illness: Patient is a 80year old female with PMH sig for asthma, chest pain, IBS, ho recurrent syn Fam Hx  Family History   Problem Relation Age of Onset   • Heart Disorder Mother         heart failure   • Cancer Sister         colon cancer age 80       Review of Systems  Comprehensive ROS reviewed and negative except for what is stated in HPI. Radiology: Ct Brain Or Head (90312)    Result Date: 8/10/2019  PROCEDURE:  CT BRAIN OR HEAD (55639)  COMPARISON:  [facility] BRAIN W/O CONTRAST, 3/03/2013, 10:55. INDICATIONS:  HTN  TECHNIQUE:  Noncontrast CT scanning is performed through the brain.  Jaja Fraga pneumothorax. Scoliosis. Dictated by: Lala Mathur MD on 8/10/2019 at 19:51     Approved by: Lala Mathur MD               Assessment/Plan:     Weakness  - unclear etiology. No infectious source identified.   Seems to be related to weaning off clon

## 2019-08-12 LAB
ATRIAL RATE: 75 BPM
ATRIAL RATE: 86 BPM
P AXIS: 58 DEGREES
P AXIS: 69 DEGREES
P-R INTERVAL: 200 MS
P-R INTERVAL: 218 MS
Q-T INTERVAL: 422 MS
Q-T INTERVAL: 432 MS
QRS DURATION: 128 MS
QRS DURATION: 132 MS
QTC CALCULATION (BEZET): 482 MS
QTC CALCULATION (BEZET): 504 MS
R AXIS: 74 DEGREES
R AXIS: 77 DEGREES
T AXIS: 5 DEGREES
T AXIS: 7 DEGREES
VENTRICULAR RATE: 75 BPM
VENTRICULAR RATE: 86 BPM

## 2019-08-12 PROCEDURE — 97116 GAIT TRAINING THERAPY: CPT

## 2019-08-12 PROCEDURE — 97161 PT EVAL LOW COMPLEX 20 MIN: CPT

## 2019-08-12 PROCEDURE — 97165 OT EVAL LOW COMPLEX 30 MIN: CPT

## 2019-08-12 PROCEDURE — 97535 SELF CARE MNGMENT TRAINING: CPT

## 2019-08-12 RX ORDER — CARVEDILOL 12.5 MG/1
25 TABLET ORAL 2 TIMES DAILY WITH MEALS
Status: DISCONTINUED | OUTPATIENT
Start: 2019-08-12 | End: 2019-08-12

## 2019-08-12 RX ORDER — CARVEDILOL 12.5 MG/1
25 TABLET ORAL 2 TIMES DAILY WITH MEALS
Status: DISCONTINUED | OUTPATIENT
Start: 2019-08-12 | End: 2019-08-13

## 2019-08-12 NOTE — PHYSICAL THERAPY NOTE
PHYSICAL THERAPY EVALUATION - INPATIENT     Room Number: 4741/6786-N  Evaluation Date: 8/12/2019  Type of Evaluation: Initial  Physician Order: PT Eval and Treat    Presenting Problem: generalized weakness  Reason for Therapy: Mobility Dysfunction an Daughter  Drives: No  Patient Owned Equipment: Rolling walker       Prior Level of [place]: Pt speaks [place] or [place]. Pt's son present to answer questions. Pt live with her daughter in [place] in a two level home.  There is one step to enter an a railing?: A Lot       AM-PAC Score:  Raw Score: 21   Approx Degree of Impairment: 28.97%   Standardized Score (AM-PAC Scale): 50.25   CMS Modifier (G-Code): CJ    FUNCTIONAL ABILITY STATUS  Gait Assessment   Gait Assistance:  Moderate assistance  Distance pain.  In this PT evaluation, the patient presents with the following impairments decreased endurance, left foot pain, decreased balance, and decreased overall functional mobility.   Functional outcome measures completed include The AM-PAC '6-Clicks' Inpati

## 2019-08-12 NOTE — PROGRESS NOTES
[facility]    Nephrology Progress Note    Ruben Gray Attending:  Benedicto Mckeon MD       SUBJECTIVE:     Anxious and poor sleep   BPs still elevated  Denies urinary complaints    PHYSICAL EXAM:     Vital Signs: BP (!) 166/50 (BP Location: Right ar 0.5 07/04/2019    NE 4.43 07/04/2019    LYMABS 2.15 07/04/2019    MOABSO 0.61 07/04/2019    EOABSO 0.16 07/04/2019    BAABSO 0.04 07/04/2019     Lab Results   Component Value Date    MALBP 18.50 03/10/2018    CREUR 106.00 03/10/2018     Lab Results   [place] check orthostatics    Elevated troponin:  -- per cardiology    D/w nursing. Thank you for allowing me to participate in the care of this patient. Please do not hesitate to call with questions or concerns. Lavone Andry Arroyo, [facility]

## 2019-08-12 NOTE — OCCUPATIONAL THERAPY NOTE
OCCUPATIONAL THERAPY EVALUATION - INPATIENT     Room Number: 6818/7197-C  Evaluation Date: 8/12/2019  Type of Evaluation: Initial  Presenting Problem: HTN, elevated troponin    Physician Order: IP Consult to Occupational Therapy  Reason for Therapy: ADL/IA day. Independent with ADL, including showering. Daughter prepares food for the pt. Uses RW or rollator. Family is trying to hire a caregiver who speaks [place] or [place]. Pt speaks both languages. 3-4 days ago, noticed increased weakness.      Oliver Waldron Score:   Score: 20  Approx Degree of Impairment: 38.32%  Standardized Score (AM-PAC Scale): 42.03  CMS Modifier (G-Code): CJ    FUNCTIONAL TRANSFER ASSESSMENT  Supine to Sit : Minimum assistance  Sit to Stand: Contact guard assist    Skilled Therapy Provide therapy records    Specific performance deficits impacting engagement in ADL/IADL LOW  1 - 3 performance deficits    Client Assessment/Performance Deficits MODERATE - Comorbidities and min to mod modifications of tasks    Clinical Decision Making LOW - Isabella Faith

## 2019-08-12 NOTE — PROGRESS NOTES
[facility]  Cardiology Progress Note    Jayashreeanjelica Millerlesli Patient Status:  Observation    6/15/1929 MRN [id number]   [facility] 7NE-A Attending Marthenia Goodell, MD   Hosp Day # 0 PCP Dary Franz MD     Assessment:  +troponin- Av.2 °F (36.8 °C), Min:97.9 °F (36.6 °C), Max:98.7 °F (37.1 °C)      Intake/Output:    Intake/Output Summary (Last 24 hours) at 2019 1539  Last data filed at 2019 0900  Gross per 24 hour   Intake 240 ml   Output 1300 ml   Net -1060 ml     Las

## 2019-08-12 NOTE — PLAN OF CARE
Assumed care at 0730  A&Ox4, VSS, NSR with frequent PVCs on tele  Orthostatics completed; see POC note  BP better controlled  Pt reports pain improving to left toes  Ambulating in halls with SB assist  Pt and family updated on POC.  Needs attended to, will

## 2019-08-12 NOTE — HOME CARE LIAISON
MET WITH PTNT AND OFFERED CHOICE  OF AGENCIES. PTNT AGREEABLE TO [facility]. MET WITH PTNT TO DISCUSS HOME HEALTH SERVICES AND COVERAGE CRITERIA. PTNT AGREEABLE TO [facility]. PTNT GIVEN RESIDENTIAL BROCHURE.  RESIDENTIAL WITH PROVIDE SN/PT ON DISC

## 2019-08-12 NOTE — CM/SW NOTE
Pt is a 80 female admitted for generalized weakness. Pt is  and has 5 children. Pt lives with her dtr [place]. Spoke with both her son Rudolph Badillo and dtr Jacky who said pt has been independent for her adls at home.   Pt is from [place] and does not speak [place]

## 2019-08-12 NOTE — PROGRESS NOTES
[facility] Hospitalist Progress Note                                                                   [address]  6/15/1929    SUBJECTIVE:  No acute events.      OBJECTIVE:  Temp:  [97.9 °F (36.6 currently denies cp  - cont asa/BB and plavix added per cards  - ECHO without wall motion abnormalities, EF 65-70. Grade 1 DD  - medical management vs cath- to be determined    Foot pain  - xray neg for fracture. No edema/erythema.             Rosalva Badillo

## 2019-08-12 NOTE — PROGRESS NOTES
08/12/19 1023 08/12/19 1028 08/12/19 1031   Vital Signs   Pulse 83 87 94   Resp 21 23 25   BP (!) 173/60 (!) 171/61 (!) 166/62   BP Location Right arm Right arm Right arm   BP Method Automatic Automatic Automatic   Patient Position Lying Sitting Standin

## 2019-08-13 VITALS
WEIGHT: 95.88 LBS | RESPIRATION RATE: 18 BRPM | OXYGEN SATURATION: 96 % | HEIGHT: 55 IN | HEART RATE: 57 BPM | SYSTOLIC BLOOD PRESSURE: 107 MMHG | BODY MASS INDEX: 22.19 KG/M2 | DIASTOLIC BLOOD PRESSURE: 43 MMHG | TEMPERATURE: 98 F

## 2019-08-13 RX ORDER — HYDRALAZINE HYDROCHLORIDE 25 MG/1
25 TABLET, FILM COATED ORAL 3 TIMES DAILY PRN
Qty: 30 TABLET | Refills: 0 | Status: SHIPPED | OUTPATIENT
Start: 2019-08-13 | End: 2019-08-20

## 2019-08-13 RX ORDER — CARVEDILOL 25 MG/1
25 TABLET ORAL 2 TIMES DAILY WITH MEALS
Qty: 60 TABLET | Refills: 0 | Status: SHIPPED | OUTPATIENT
Start: 2019-08-13 | End: 2019-08-22

## 2019-08-13 RX ORDER — LISINOPRIL 10 MG/1
10 TABLET ORAL 2 TIMES DAILY
Qty: 60 TABLET | Refills: 0 | Status: SHIPPED | OUTPATIENT
Start: 2019-08-13 | End: 2019-08-22

## 2019-08-13 NOTE — PLAN OF CARE
NURSING DISCHARGE NOTE    Discharged Home with [place] via Wheelchair. Accompanied by Family member  Belongings Taken by patient/family.   PIV and tele dc'd  Medication changes, follow up appointments, and DC instructions reviewed  Questions and concerns ad

## 2019-08-13 NOTE — PROGRESS NOTES
[facility]    Nephrology Progress Note    John Judge Attending:  Mazin Post MD       SUBJECTIVE:     No orthostatic symptoms  Eating ok  No urinary complaints    PHYSICAL EXAM:     Vital Signs: /50 (BP Location: Right arm)   Pulse 58 NE 4.43 07/04/2019    LYMABS 2.15 07/04/2019    MOABSO 0.61 07/04/2019    EOABSO 0.16 07/04/2019    BAABSO 0.04 07/04/2019     Lab Results   Component Value Date    MALBP 18.50 03/10/2018    CREUR 106.00 03/10/2018     Lab Results   Component Value Date nursing. Ok to discharge from nephrology standpoint and follow up with her nephrologist, Dr. Halina Mckeon in a few weeks for BP check.           Thank you for allowing me to participate in the care of this patient.  Please do not hesitate to call with questions o

## 2019-08-13 NOTE — DISCHARGE SUMMARY
General Medicine Discharge Summary     Patient ID:  Aury Gary  80year old  6/15/1929    Admit date: 8/10/2019    Discharge date and time:  8/13/19    Attending Physician: Fifi Alba MD be related to weaning off clonidine, perhaps from rebound HTN or side effect of medication itself  - doing better. Eating well.  Working well with PT.   - no focal s/s of infection  - CT head OK     Elevated troponin- felt to be demand 2/2 malignant HTN per

## 2019-08-13 NOTE — PROGRESS NOTES
08/13/19 1604   Clinical Encounter Type   Visited With Patient and family together  (Gave family POLST information. Adriane Vernon they wanted to read it. I answered a number of questions. Will connect with nurse.  Nurse to call  if they decide to fill it

## 2019-08-13 NOTE — PROGRESS NOTES
08/13/19 7898   Clinical Encounter Type   Visited With Patient and family together  (Family present in room during POLST and POA. Explained everything to patient. Copies of POA given to family.  Awaiting physician signature on POLST )   Routine Visit Az Brown

## 2019-08-13 NOTE — PROGRESS NOTES
[facility]  Cardiology Progress Note    Jessica Olmos Patient Status:  Observation    6/15/1929 MRN [id number]   [facility] 7NE-A Attending Marthenia Goodell, MD   Hosp Day # 0 PCP Dary Franz MD     Assessment:  +troponin- Pantoprazole Sodium  20 mg Oral QAM AC           Recent Labs     08/11/19  0545   WBC 8.4   HGB 12.8   .0     Recent Labs     08/10/19  1812 08/11/19  0545 08/11/19  1133   BUN 20* 18  --    CREATSERUM 0.88 0.79  --    MG  --  2.1  --     140

## 2019-08-13 NOTE — CM/SW NOTE
Pt will d/c home today with [facility]. Libertad Gamino from [facility] aware.

## 2019-08-14 NOTE — CM/SW NOTE
08/14/19 0900   Discharge disposition   Expected discharge disposition Home-Health   Name of Jim Proc. Gautam Martinez 1 services after discharge Skilled home care   Discharge transportation [facility]

## 2019-08-31 ENCOUNTER — HOSPITAL ENCOUNTER (EMERGENCY)
Facility: HOSPITAL | Age: 84
Discharge: HOME | End: 2019-08-31
Attending: EMERGENCY MEDICINE
Payer: MEDICARE

## 2019-08-31 ENCOUNTER — APPOINTMENT (OUTPATIENT)
Dept: GENERAL RADIOLOGY | Facility: HOSPITAL | Age: 84
End: 2019-08-31
Attending: EMERGENCY MEDICINE
Payer: MEDICARE

## 2019-08-31 VITALS
DIASTOLIC BLOOD PRESSURE: 63 MMHG | HEIGHT: 59 IN | BODY MASS INDEX: 19.35 KG/M2 | SYSTOLIC BLOOD PRESSURE: 153 MMHG | TEMPERATURE: 98 F | OXYGEN SATURATION: 98 % | HEART RATE: 69 BPM | WEIGHT: 96 LBS | RESPIRATION RATE: 17 BRPM

## 2019-08-31 DIAGNOSIS — R55 SYNCOPE, NEAR: ICD-10-CM

## 2019-08-31 DIAGNOSIS — R53.1 WEAKNESS: Primary | ICD-10-CM

## 2019-08-31 LAB
ALBUMIN SERPL-MCNC: 3.1 G/DL (ref 3.4–5)
ALBUMIN/GLOB SERPL: 1.1 {RATIO} (ref 1–2)
ALP LIVER SERPL-CCNC: 76 U/L (ref 55–142)
ALT SERPL-CCNC: 20 U/L (ref 13–56)
ANION GAP SERPL CALC-SCNC: 7 MMOL/L (ref 0–18)
AST SERPL-CCNC: 33 U/L (ref 15–37)
ATRIAL RATE: 70 BPM
BASOPHILS # BLD AUTO: 0.02 X10(3) UL (ref 0–0.2)
BASOPHILS NFR BLD AUTO: 0.5 %
BILIRUB SERPL-MCNC: 0.9 MG/DL (ref 0.1–2)
BILIRUB UR QL STRIP.AUTO: NEGATIVE
BUN BLD-MCNC: 20 MG/DL (ref 7–18)
BUN/CREAT SERPL: 18.5 (ref 10–20)
CALCIUM BLD-MCNC: 8.7 MG/DL (ref 8.5–10.1)
CHLORIDE SERPL-SCNC: 98 MMOL/L (ref 98–112)
CO2 SERPL-SCNC: 27 MMOL/L (ref 21–32)
COLOR UR AUTO: YELLOW
CREAT BLD-MCNC: 1.08 MG/DL (ref 0.55–1.02)
DEPRECATED RDW RBC AUTO: 34.5 FL (ref 35.1–46.3)
EOSINOPHIL # BLD AUTO: 0.13 X10(3) UL (ref 0–0.7)
EOSINOPHIL NFR BLD AUTO: 3 %
ERYTHROCYTE [DISTWIDTH] IN BLOOD BY AUTOMATED COUNT: 12.8 % (ref 11–15)
GLOBULIN PLAS-MCNC: 2.9 G/DL (ref 2.8–4.4)
GLUCOSE BLD-MCNC: 145 MG/DL (ref 70–99)
GLUCOSE UR STRIP.AUTO-MCNC: NEGATIVE MG/DL
HCT VFR BLD AUTO: 33.8 % (ref 35–48)
HGB BLD-MCNC: 11.3 G/DL (ref 12–16)
HYALINE CASTS #/AREA URNS AUTO: PRESENT /LPF
IMM GRANULOCYTES # BLD AUTO: 0 X10(3) UL (ref 0–1)
IMM GRANULOCYTES NFR BLD: 0 %
KETONES UR STRIP.AUTO-MCNC: NEGATIVE MG/DL
LYMPHOCYTES # BLD AUTO: 1.29 X10(3) UL (ref 1–4)
LYMPHOCYTES NFR BLD AUTO: 29.6 %
M PROTEIN MFR SERPL ELPH: 6 G/DL (ref 6.4–8.2)
MCH RBC QN AUTO: 25.1 PG (ref 26–34)
MCHC RBC AUTO-ENTMCNC: 33.4 G/DL (ref 31–37)
MCV RBC AUTO: 75.1 FL (ref 80–100)
MONOCYTES # BLD AUTO: 0.4 X10(3) UL (ref 0.1–1)
MONOCYTES NFR BLD AUTO: 9.2 %
NEUTROPHILS # BLD AUTO: 2.52 X10 (3) UL (ref 1.5–7.7)
NEUTROPHILS # BLD AUTO: 2.52 X10(3) UL (ref 1.5–7.7)
NEUTROPHILS NFR BLD AUTO: 57.7 %
NITRITE UR QL STRIP.AUTO: NEGATIVE
OSMOLALITY SERPL CALC.SUM OF ELEC: 279 MOSM/KG (ref 275–295)
P AXIS: 47 DEGREES
P-R INTERVAL: 264 MS
PH UR STRIP.AUTO: 7 [PH] (ref 4.5–8)
PLATELET # BLD AUTO: 243 10(3)UL (ref 150–450)
POTASSIUM SERPL-SCNC: 4.7 MMOL/L (ref 3.5–5.1)
PROT UR STRIP.AUTO-MCNC: 30 MG/DL
Q-T INTERVAL: 430 MS
QRS DURATION: 138 MS
QTC CALCULATION (BEZET): 464 MS
R AXIS: 69 DEGREES
RBC # BLD AUTO: 4.5 X10(6)UL (ref 3.8–5.3)
RBC UR QL AUTO: NEGATIVE
SODIUM SERPL-SCNC: 132 MMOL/L (ref 136–145)
SP GR UR STRIP.AUTO: 1.02 (ref 1–1.03)
T AXIS: 14 DEGREES
TROPONIN I SERPL-MCNC: 0.07 NG/ML (ref ?–0.04)
UROBILINOGEN UR STRIP.AUTO-MCNC: <2 MG/DL
VENTRICULAR RATE: 70 BPM
WBC # BLD AUTO: 4.4 X10(3) UL (ref 4–11)

## 2019-08-31 PROCEDURE — 93010 ELECTROCARDIOGRAM REPORT: CPT

## 2019-08-31 PROCEDURE — 99285 EMERGENCY DEPT VISIT HI MDM: CPT

## 2019-08-31 PROCEDURE — 85025 COMPLETE CBC W/AUTO DIFF WBC: CPT | Performed by: EMERGENCY MEDICINE

## 2019-08-31 PROCEDURE — 96361 HYDRATE IV INFUSION ADD-ON: CPT

## 2019-08-31 PROCEDURE — 87086 URINE CULTURE/COLONY COUNT: CPT | Performed by: EMERGENCY MEDICINE

## 2019-08-31 PROCEDURE — 81001 URINALYSIS AUTO W/SCOPE: CPT | Performed by: EMERGENCY MEDICINE

## 2019-08-31 PROCEDURE — 71045 X-RAY EXAM CHEST 1 VIEW: CPT | Performed by: EMERGENCY MEDICINE

## 2019-08-31 PROCEDURE — 93005 ELECTROCARDIOGRAM TRACING: CPT

## 2019-08-31 PROCEDURE — 96360 HYDRATION IV INFUSION INIT: CPT

## 2019-08-31 PROCEDURE — 84484 ASSAY OF TROPONIN QUANT: CPT | Performed by: EMERGENCY MEDICINE

## 2019-08-31 PROCEDURE — 80053 COMPREHEN METABOLIC PANEL: CPT | Performed by: EMERGENCY MEDICINE

## 2019-08-31 NOTE — ED PROVIDER NOTES
Patient Seen in: [facility] Emergency Department    History   Patient presents with:  Syncope (cardiovascular, neurologic)    Stated Complaint:     HPI    Patient is a 66-year-old female with multiple medical problems include hypertension who presen UPPER GI ENDOSCOPY,DIAGNOSIS  12/01    wnl                    No pertinent social history. Review of Systems    Positive for stated complaint:   Other systems are as noted in HPI. Constitutional and vital signs reviewed.       All other systems I - Abnormal; Notable for the following components:    Troponin 0.070 (*)     All other components within normal limits   CBC W/ DIFFERENTIAL - Abnormal; Notable for the following components:    HGB 11.3 (*)     HCT 33.8 (*)     MCV 75.1 (*)     MCH 25.1 ( patient and family admission for observation. They would like to take her home. They know of risks including arrhythmia, heart disease. Continue medications. Would hold blood pressure medications if systolic blood pressure is below 120.   We will follow

## 2019-08-31 NOTE — ED INITIAL ASSESSMENT (HPI)
Pt to ed after having a 30sec syncopal episode this morning. Pt family rpts no LOC, just became \"nonverbal/unresponsive\" while sitting on the ground.  Wants eval

## 2019-08-31 NOTE — ED NOTES
Pt ambulating with walker. Unable to walk 20 feet out of room without feeling weak and tired. Wheelchair brought to patient. Pt returned to bed. Will continue to monitor.

## 2020-01-19 ENCOUNTER — DIAGNOSTIC TRANS (OUTPATIENT)
Dept: OTHER | Age: 85
End: 2020-01-19

## 2020-01-19 ENCOUNTER — HOSPITAL (OUTPATIENT)
Dept: OTHER | Age: 85
End: 2020-01-19

## 2020-01-19 LAB
ALBUMIN SERPL-MCNC: 3.1 G/DL (ref 3.6–5.1)
ALBUMIN/GLOB SERPL: 1.1 {RATIO} (ref 1–2.4)
ALP SERPL-CCNC: 69 UNITS/L (ref 45–117)
ALT SERPL-CCNC: 15 UNITS/L
AMORPH SED URNS QL MICRO: ABNORMAL
ANALYZER ANC (IANC): ABNORMAL
ANION GAP SERPL CALC-SCNC: 11 MMOL/L (ref 10–20)
APPEARANCE UR: ABNORMAL
APTT PPP: 24 SEC (ref 22–32)
APTT PPP: NORMAL S
APTT PPP: NORMAL S
AST SERPL-CCNC: 18 UNITS/L
BACTERIA #/AREA URNS HPF: ABNORMAL /HPF
BASOPHILS # BLD: 0 K/MCL (ref 0–0.3)
BASOPHILS NFR BLD: 1 %
BILIRUB SERPL-MCNC: 0.7 MG/DL (ref 0.2–1)
BILIRUB UR QL: NEGATIVE
BUN SERPL-MCNC: 20 MG/DL (ref 6–20)
BUN/CREAT SERPL: 18 (ref 7–25)
CALCIUM SERPL-MCNC: 8.1 MG/DL (ref 8.4–10.2)
CAOX CRY URNS QL MICRO: ABNORMAL
CHLORIDE SERPL-SCNC: 103 MMOL/L (ref 98–107)
CO2 SERPL-SCNC: 26 MMOL/L (ref 21–32)
COLOR UR: ABNORMAL
CREAT SERPL-MCNC: 1.13 MG/DL (ref 0.51–0.95)
DIFFERENTIAL METHOD BLD: ABNORMAL
EOSINOPHIL # BLD: 0 K/MCL (ref 0.1–0.5)
EOSINOPHIL NFR BLD: 0 %
EPITH CASTS #/AREA URNS LPF: ABNORMAL /[LPF]
ERYTHROCYTE [DISTWIDTH] IN BLOOD: 13.2 % (ref 11–15)
FATTY CASTS #/AREA URNS LPF: ABNORMAL /[LPF]
GLOBULIN SER-MCNC: 2.9 G/DL (ref 2–4)
GLUCOSE BLDC GLUCOMTR-MCNC: 104 MG/DL (ref 70–99)
GLUCOSE BLDC GLUCOMTR-MCNC: 176 MG/DL (ref 70–99)
GLUCOSE BLDC GLUCOMTR-MCNC: ABNORMAL MG/DL
GLUCOSE SERPL-MCNC: 150 MG/DL (ref 65–99)
GLUCOSE UR-MCNC: NEGATIVE MG/DL
GRAN CASTS #/AREA URNS LPF: ABNORMAL /[LPF]
HCT VFR BLD CALC: 35.7 % (ref 36–46.5)
HGB BLD-MCNC: 11.4 G/DL (ref 12–15.5)
HGB UR QL: NEGATIVE
HYALINE CASTS #/AREA URNS LPF: ABNORMAL /LPF (ref 0–5)
IMM GRANULOCYTES # BLD AUTO: 0 K/MCL (ref 0–0.2)
IMM GRANULOCYTES NFR BLD: 0 %
INFLUENZA A VIRUS (XFLUA): NOT DETECTED
INFLUENZA B VIRUS (XFLUB): NORMAL
INFLUENZA B VIRUS (XFLUB): NOT DETECTED
INR PPP: 1
INR PPP: NORMAL
KETONES UR-MCNC: NEGATIVE MG/DL
LACTATE BLDV-MCNC: 1.9 MMOL/L
LACTATE BLDV-SCNC: 1 MMOL/L (ref 0–2)
LEUKOCYTE ESTERASE UR QL STRIP: NEGATIVE
LYMPHOCYTES # BLD: 0.8 K/MCL (ref 1–4)
LYMPHOCYTES NFR BLD: 13 %
MAGNESIUM SERPL-MCNC: 1.9 MG/DL (ref 1.7–2.4)
MCH RBC QN AUTO: 24.4 PG (ref 26–34)
MCHC RBC AUTO-ENTMCNC: 31.9 G/DL (ref 32–36.5)
MCV RBC AUTO: 76.3 FL (ref 78–100)
MIXED CELL CASTS #/AREA URNS LPF: ABNORMAL /[LPF]
MONOCYTES # BLD: 0.4 K/MCL (ref 0.3–0.9)
MONOCYTES NFR BLD: 7 %
MUCOUS THREADS URNS QL MICRO: PRESENT
NEUTROPHILS # BLD: 4.9 K/MCL (ref 1.8–7.7)
NEUTROPHILS NFR BLD: 79 %
NEUTS SEG NFR BLD: ABNORMAL %
NITRITE UR QL: NEGATIVE
NRBC (NRBCRE): 0 /100 WBC
NT-PROBNP SERPL-MCNC: 3229 PG/ML
PH UR: 5 UNITS (ref 5–7)
PLATELET # BLD: 162 K/MCL (ref 140–450)
POTASSIUM SERPL-SCNC: 2.9 MMOL/L (ref 3.4–5.1)
PROCALCITONIN SERPL IA-MCNC: 0.11 NG/ML
PROCALCITONIN SERPL IA-MCNC: ABNORMAL NG/ML
PROT SERPL-MCNC: 6 G/DL (ref 6.4–8.2)
PROT UR QL: 100 MG/DL
PROTHROMBIN TIME (PRT2): NORMAL
PROTHROMBIN TIME: 11 SEC (ref 9.7–11.8)
RBC # BLD: 4.68 MIL/MCL (ref 4–5.2)
RBC #/AREA URNS HPF: ABNORMAL /HPF (ref 0–2)
RBC CASTS #/AREA URNS LPF: ABNORMAL /[LPF]
RENAL EPI CELLS #/AREA URNS HPF: ABNORMAL /[HPF]
SODIUM SERPL-SCNC: 137 MMOL/L (ref 135–145)
SP GR UR: 1.02 (ref 1–1.03)
SPECIMEN SOURCE (FLUSC): NORMAL
SPECIMEN SOURCE: ABNORMAL
SPERM URNS QL MICRO: ABNORMAL
SQUAMOUS #/AREA URNS HPF: ABNORMAL /HPF (ref 0–5)
T VAGINALIS URNS QL MICRO: ABNORMAL
TRI-PHOS CRY URNS QL MICRO: ABNORMAL
TROPONIN I SERPL HS-MCNC: 0.08 NG/ML
TROPONIN I SERPL HS-MCNC: 0.09 NG/ML
TROPONIN I SERPL HS-MCNC: ABNORMAL NG/L
URATE CRY URNS QL MICRO: ABNORMAL
URINE REFLEX: ABNORMAL
URNS CMNT MICRO: ABNORMAL
UROBILINOGEN UR QL: 2 MG/DL (ref 0–1)
WAXY CASTS #/AREA URNS LPF: ABNORMAL /[LPF]
WBC # BLD: 6.3 K/MCL (ref 4.2–11)
WBC #/AREA URNS HPF: ABNORMAL /HPF (ref 0–5)
WBC CASTS #/AREA URNS LPF: ABNORMAL /[LPF]
YEAST HYPHAE URNS QL MICRO: ABNORMAL
YEAST URNS QL MICRO: ABNORMAL

## 2020-01-20 ENCOUNTER — HOSPITAL (OUTPATIENT)
Dept: OTHER | Age: 85
End: 2020-01-20

## 2020-01-20 LAB
2009 H1N1 SUBTYPE (RF1N1): NOT DETECTED
ADENOVIRUS (RADENO): NOT DETECTED
ALBUMIN SERPL-MCNC: 2.8 G/DL (ref 3.6–5.1)
ALBUMIN/GLOB SERPL: 1 {RATIO} (ref 1–2.4)
ALP SERPL-CCNC: 60 UNITS/L (ref 45–117)
ALT SERPL-CCNC: 15 UNITS/L
ANALYZER ANC (IANC): ABNORMAL
ANION GAP SERPL CALC-SCNC: 11 MMOL/L (ref 10–20)
AST SERPL-CCNC: 20 UNITS/L
BASOPHILS # BLD: 0 K/MCL (ref 0–0.3)
BASOPHILS NFR BLD: 0 %
BILIRUB SERPL-MCNC: 0.6 MG/DL (ref 0.2–1)
BOCAVIRUS (RBOCA): NOT DETECTED
BUN SERPL-MCNC: 11 MG/DL (ref 6–20)
BUN/CREAT SERPL: 15 (ref 7–25)
C. PNEUMONIAE (RCHLP): NOT DETECTED
CALCIUM SERPL-MCNC: 7.9 MG/DL (ref 8.4–10.2)
CHLORIDE SERPL-SCNC: 105 MMOL/L (ref 98–107)
CO2 SERPL-SCNC: 25 MMOL/L (ref 21–32)
CORONAVIRUS 229E (RC229E): NOT DETECTED
CORONAVIRUS HKU1 (RCHKU1): NOT DETECTED
CORONAVIRUS NL63 (RCNL63): NOT DETECTED
CORONAVIRUS OC43 (RCO43): NOT DETECTED
CREAT SERPL-MCNC: 0.75 MG/DL (ref 0.51–0.95)
DIFFERENTIAL METHOD BLD: ABNORMAL
EOSINOPHIL # BLD: 0 K/MCL (ref 0.1–0.5)
EOSINOPHIL NFR BLD: 0 %
ERYTHROCYTE [DISTWIDTH] IN BLOOD: 13.3 % (ref 11–15)
GLOBULIN SER-MCNC: 2.9 G/DL (ref 2–4)
GLUCOSE BLDC GLUCOMTR-MCNC: 137 MG/DL (ref 70–99)
GLUCOSE BLDC GLUCOMTR-MCNC: 85 MG/DL (ref 70–99)
GLUCOSE BLDC GLUCOMTR-MCNC: ABNORMAL MG/DL
GLUCOSE BLDC GLUCOMTR-MCNC: NORMAL MG/DL
GLUCOSE SERPL-MCNC: 97 MG/DL (ref 65–99)
HCT VFR BLD CALC: 36.1 % (ref 36–46.5)
HGB BLD-MCNC: 11.5 G/DL (ref 12–15.5)
IMM GRANULOCYTES # BLD AUTO: 0 K/MCL (ref 0–0.2)
IMM GRANULOCYTES NFR BLD: 0 %
INFLUENZA A SUBTYPE H1 (RFLH1): NOT DETECTED
INFLUENZA A SUBTYPE H3 (RFLH3): NOT DETECTED
INFLUENZA A UNSUBTYPABLE (RIAU): ABNORMAL
INFLUENZA B VIRUS (RFLUB): NOT DETECTED
LYMPHOCYTES # BLD: 1.6 K/MCL (ref 1–4)
LYMPHOCYTES NFR BLD: 32 %
M. PNEUMONIAE (RMYPP): ABNORMAL
M. PNEUMONIAE (RMYPP): NOT DETECTED
MAGNESIUM SERPL-MCNC: 1.5 MG/DL (ref 1.7–2.4)
MCH RBC QN AUTO: 24.4 PG (ref 26–34)
MCHC RBC AUTO-ENTMCNC: 31.9 G/DL (ref 32–36.5)
MCV RBC AUTO: 76.5 FL (ref 78–100)
METAPNEUMOVIRUS (RMETA): NOT DETECTED
MONOCYTES # BLD: 0.5 K/MCL (ref 0.3–0.9)
MONOCYTES NFR BLD: 10 %
NEUTROPHILS # BLD: 2.8 K/MCL (ref 1.8–7.7)
NEUTROPHILS NFR BLD: 58 %
NEUTS SEG NFR BLD: ABNORMAL %
NRBC (NRBCRE): 0 /100 WBC
PARAINFLUENZA, TYPE 1 (RPAR1): NOT DETECTED
PARAINFLUENZA, TYPE 2 (RPAR2): NOT DETECTED
PARAINFLUENZA, TYPE 3 (RPAR3): NOT DETECTED
PARAINFLUENZA, TYPE 4 (RPAR4): NOT DETECTED
PLATELET # BLD: 155 K/MCL (ref 140–450)
POTASSIUM SERPL-SCNC: 3 MMOL/L (ref 3.4–5.1)
POTASSIUM SERPL-SCNC: 3.5 MMOL/L (ref 3.4–5.1)
PROT SERPL-MCNC: 5.7 G/DL (ref 6.4–8.2)
RBC # BLD: 4.72 MIL/MCL (ref 4–5.2)
RHINOVIRUS/ENTEROVIRUS (RRHINO): NOT DETECTED
RSV, SUBTYPE A (RRSVA): NOT DETECTED
RSV, SUBTYPE B (RRSVB): POSITIVE
SODIUM SERPL-SCNC: 138 MMOL/L (ref 135–145)
SPECIMEN SOURCE: ABNORMAL
TROPONIN I SERPL HS-MCNC: 0.11 NG/ML
TROPONIN I SERPL HS-MCNC: ABNORMAL NG/L
TROPONIN I SERPL HS-MCNC: ABNORMAL NG/L
WBC # BLD: 4.9 K/MCL (ref 4.2–11)

## 2020-01-24 LAB
BACTERIA BLD CULT: NORMAL

## 2020-05-25 NOTE — PHYSICAL THERAPY NOTE
Consult received, chart reviewed. Pt with uptrending troponins. PT to hold until pt able to participate in mobility safely.   AE PT DPT [facility] Emergency Department  [address]  [facility] [address]  Phone:  [phone]  Fax:  [phone]                                    Sajan Maddox   MRN: [id number]    Department:  [facility] Emergency Department   Date of Visit:  5/25/2020           After Visit Summary Signature Page    I have received my discharge instructions, and my questions have been answered. I have discussed any challenges I see with this plan with the nurse or doctor.    ..........................................................................................................................................  Patient/Patient Representative Signature      ..........................................................................................................................................  Patient Representative Print Name and Relationship to Patient    ..................................................               ................................................  Date                                   Time    ..........................................................................................................................................  Reviewed by Signature/Title    ...................................................              ..............................................  Date                                               Time          22EPIC Rev 08/18

## 2022-07-23 ENCOUNTER — APPOINTMENT (OUTPATIENT)
Dept: CT IMAGING | Age: 87
End: 2022-07-23
Attending: EMERGENCY MEDICINE

## 2022-07-23 ENCOUNTER — HOSPITAL ENCOUNTER (OUTPATIENT)
Age: 87
Setting detail: OBSERVATION
Discharge: HOME | End: 2022-07-24
Attending: EMERGENCY MEDICINE | Admitting: HOSPITALIST

## 2022-07-23 ENCOUNTER — APPOINTMENT (OUTPATIENT)
Dept: GENERAL RADIOLOGY | Age: 87
End: 2022-07-23
Attending: EMERGENCY MEDICINE

## 2022-07-23 DIAGNOSIS — R55 SYNCOPE, UNSPECIFIED SYNCOPE TYPE: Primary | ICD-10-CM

## 2022-07-23 DIAGNOSIS — R79.89 ELEVATED TROPONIN LEVEL: ICD-10-CM

## 2022-07-23 LAB
ALBUMIN SERPL-MCNC: 3.3 G/DL (ref 3.6–5.1)
ALBUMIN/GLOB SERPL: 1.2 {RATIO} (ref 1–2.4)
ALP SERPL-CCNC: 90 UNITS/L (ref 45–117)
ALT SERPL-CCNC: 17 UNITS/L
ANION GAP SERPL CALC-SCNC: 9 MMOL/L (ref 7–19)
APPEARANCE UR: CLEAR
AST SERPL-CCNC: 20 UNITS/L
ATRIAL RATE (BPM): 61
ATRIAL RATE (BPM): 64
BACTERIA #/AREA URNS HPF: ABNORMAL /HPF
BASOPHILS # BLD: 0 K/MCL (ref 0–0.3)
BASOPHILS NFR BLD: 0 %
BILIRUB SERPL-MCNC: 0.9 MG/DL (ref 0.2–1)
BILIRUB UR QL STRIP: NEGATIVE
BUN SERPL-MCNC: 24 MG/DL (ref 6–20)
BUN/CREAT SERPL: 26 (ref 7–25)
CALCIUM SERPL-MCNC: 8.4 MG/DL (ref 8.4–10.2)
CHLORIDE SERPL-SCNC: 105 MMOL/L (ref 97–110)
CO2 SERPL-SCNC: 29 MMOL/L (ref 21–32)
COLOR UR: YELLOW
CREAT SERPL-MCNC: 0.92 MG/DL (ref 0.51–0.95)
DEPRECATED RDW RBC: 37.7 FL (ref 39–50)
EOSINOPHIL # BLD: 0.1 K/MCL (ref 0–0.5)
EOSINOPHIL NFR BLD: 3 %
ERYTHROCYTE [DISTWIDTH] IN BLOOD: 13.2 % (ref 11–15)
FASTING DURATION TIME PATIENT: ABNORMAL H
FLUAV RNA RESP QL NAA+PROBE: NOT DETECTED
FLUBV RNA RESP QL NAA+PROBE: NOT DETECTED
GFR SERPLBLD BASED ON 1.73 SQ M-ARVRAT: 58 ML/MIN
GLOBULIN SER-MCNC: 2.8 G/DL (ref 2–4)
GLUCOSE BLDC GLUCOMTR-MCNC: 112 MG/DL (ref 70–99)
GLUCOSE BLDC GLUCOMTR-MCNC: 180 MG/DL (ref 70–99)
GLUCOSE SERPL-MCNC: 187 MG/DL (ref 70–99)
GLUCOSE UR STRIP-MCNC: NEGATIVE MG/DL
HCT VFR BLD CALC: 33.6 % (ref 36–46.5)
HGB BLD-MCNC: 10.6 G/DL (ref 12–15.5)
HGB UR QL STRIP: NEGATIVE
HYALINE CASTS #/AREA URNS LPF: ABNORMAL /LPF
IMM GRANULOCYTES # BLD AUTO: 0 K/MCL (ref 0–0.2)
IMM GRANULOCYTES # BLD: 0 %
KETONES UR STRIP-MCNC: NEGATIVE MG/DL
LEUKOCYTE ESTERASE UR QL STRIP: NEGATIVE
LYMPHOCYTES # BLD: 1.6 K/MCL (ref 1–4)
LYMPHOCYTES NFR BLD: 34 %
MCH RBC QN AUTO: 24.7 PG (ref 26–34)
MCHC RBC AUTO-ENTMCNC: 31.5 G/DL (ref 32–36.5)
MCV RBC AUTO: 78.3 FL (ref 78–100)
MONOCYTES # BLD: 0.4 K/MCL (ref 0.3–0.9)
MONOCYTES NFR BLD: 8 %
MUCOUS THREADS URNS QL MICRO: PRESENT
NEUTROPHILS # BLD: 2.7 K/MCL (ref 1.8–7.7)
NEUTROPHILS NFR BLD: 55 %
NITRITE UR QL STRIP: NEGATIVE
NRBC BLD MANUAL-RTO: 0 /100 WBC
P AXIS (DEGREES): 57
P AXIS (DEGREES): 64
PH UR STRIP: 6 [PH] (ref 5–7)
PLATELET # BLD AUTO: 198 K/MCL (ref 140–450)
POTASSIUM SERPL-SCNC: 3.8 MMOL/L (ref 3.4–5.1)
PR-INTERVAL (MSEC): 272
PR-INTERVAL (MSEC): 274
PROT SERPL-MCNC: 6.1 G/DL (ref 6.4–8.2)
PROT UR STRIP-MCNC: 30 MG/DL
QRS-INTERVAL (MSEC): 134
QRS-INTERVAL (MSEC): 144
QT-INTERVAL (MSEC): 458
QT-INTERVAL (MSEC): 476
QTC: 473
QTC: 479
R AXIS (DEGREES): 70
R AXIS (DEGREES): 72
RAINBOW EXTRA TUBES HOLD SPECIMEN: NORMAL
RAINBOW EXTRA TUBES HOLD SPECIMEN: NORMAL
RBC # BLD: 4.29 MIL/MCL (ref 4–5.2)
RBC #/AREA URNS HPF: ABNORMAL /HPF
REPORT TEXT: NORMAL
REPORT TEXT: NORMAL
RSV AG NPH QL IA.RAPID: NOT DETECTED
SARS-COV-2 RNA RESP QL NAA+PROBE: NOT DETECTED
SERVICE CMNT-IMP: NORMAL
SERVICE CMNT-IMP: NORMAL
SODIUM SERPL-SCNC: 139 MMOL/L (ref 135–145)
SP GR UR STRIP: 1.02 (ref 1–1.03)
SQUAMOUS #/AREA URNS HPF: ABNORMAL /HPF
T AXIS (DEGREES): 17
T AXIS (DEGREES): 24
TROPONIN I SERPL DL<=0.01 NG/ML-MCNC: 81 NG/L
TROPONIN I SERPL DL<=0.01 NG/ML-MCNC: 83 NG/L
UROBILINOGEN UR STRIP-MCNC: 0.2 MG/DL
VENTRICULAR RATE EKG/MIN (BPM): 61
VENTRICULAR RATE EKG/MIN (BPM): 64
WBC # BLD: 4.8 K/MCL (ref 4.2–11)
WBC #/AREA URNS HPF: ABNORMAL /HPF

## 2022-07-23 PROCEDURE — 10002803 HB RX 637: Performed by: HOSPITALIST

## 2022-07-23 PROCEDURE — 85025 COMPLETE CBC W/AUTO DIFF WBC: CPT | Performed by: EMERGENCY MEDICINE

## 2022-07-23 PROCEDURE — 36415 COLL VENOUS BLD VENIPUNCTURE: CPT

## 2022-07-23 PROCEDURE — 84484 ASSAY OF TROPONIN QUANT: CPT | Performed by: EMERGENCY MEDICINE

## 2022-07-23 PROCEDURE — G0378 HOSPITAL OBSERVATION PER HR: HCPCS

## 2022-07-23 PROCEDURE — 10002807 HB RX 258: Performed by: HOSPITALIST

## 2022-07-23 PROCEDURE — 70450 CT HEAD/BRAIN W/O DYE: CPT

## 2022-07-23 PROCEDURE — C9803 HOPD COVID-19 SPEC COLLECT: HCPCS

## 2022-07-23 PROCEDURE — 82962 GLUCOSE BLOOD TEST: CPT

## 2022-07-23 PROCEDURE — 71045 X-RAY EXAM CHEST 1 VIEW: CPT

## 2022-07-23 PROCEDURE — 0241U COVID/FLU/RSV PANEL: CPT | Performed by: EMERGENCY MEDICINE

## 2022-07-23 PROCEDURE — 10002800 HB RX 250 W HCPCS: Performed by: HOSPITALIST

## 2022-07-23 PROCEDURE — 93010 ELECTROCARDIOGRAM REPORT: CPT | Performed by: SPECIALIST

## 2022-07-23 PROCEDURE — 93005 ELECTROCARDIOGRAM TRACING: CPT | Performed by: EMERGENCY MEDICINE

## 2022-07-23 PROCEDURE — G1004 CDSM NDSC: HCPCS

## 2022-07-23 PROCEDURE — 99285 EMERGENCY DEPT VISIT HI MDM: CPT

## 2022-07-23 PROCEDURE — 96372 THER/PROPH/DIAG INJ SC/IM: CPT

## 2022-07-23 PROCEDURE — 10004651 HB RX, NO CHARGE ITEM: Performed by: HOSPITALIST

## 2022-07-23 PROCEDURE — 81001 URINALYSIS AUTO W/SCOPE: CPT | Performed by: EMERGENCY MEDICINE

## 2022-07-23 PROCEDURE — P9612 CATHETERIZE FOR URINE SPEC: HCPCS | Performed by: EMERGENCY MEDICINE

## 2022-07-23 PROCEDURE — 80053 COMPREHEN METABOLIC PANEL: CPT | Performed by: EMERGENCY MEDICINE

## 2022-07-23 RX ORDER — SODIUM CHLORIDE 9 MG/ML
INJECTION, SOLUTION INTRAVENOUS CONTINUOUS
Status: DISCONTINUED | OUTPATIENT
Start: 2022-07-23 | End: 2022-07-24 | Stop reason: HOSPADM

## 2022-07-23 RX ORDER — OMEPRAZOLE 40 MG/1
40 CAPSULE, DELAYED RELEASE ORAL DAILY
Status: ON HOLD | COMMUNITY
End: 2023-03-30

## 2022-07-23 RX ORDER — AMLODIPINE BESYLATE 5 MG/1
5 TABLET ORAL DAILY
COMMUNITY
Start: 2022-07-03

## 2022-07-23 RX ORDER — PANTOPRAZOLE SODIUM 40 MG/1
40 TABLET, DELAYED RELEASE ORAL
Status: DISCONTINUED | OUTPATIENT
Start: 2022-07-24 | End: 2022-07-24 | Stop reason: HOSPADM

## 2022-07-23 RX ORDER — LISINOPRIL 10 MG/1
10 TABLET ORAL DAILY
Status: DISCONTINUED | OUTPATIENT
Start: 2022-07-24 | End: 2022-07-24 | Stop reason: HOSPADM

## 2022-07-23 RX ORDER — NICOTINE POLACRILEX 4 MG
15 LOZENGE BUCCAL PRN
Status: DISCONTINUED | OUTPATIENT
Start: 2022-07-23 | End: 2022-07-24 | Stop reason: HOSPADM

## 2022-07-23 RX ORDER — ONDANSETRON 2 MG/ML
4 INJECTION INTRAMUSCULAR; INTRAVENOUS EVERY 6 HOURS PRN
Status: DISCONTINUED | OUTPATIENT
Start: 2022-07-23 | End: 2022-07-24 | Stop reason: HOSPADM

## 2022-07-23 RX ORDER — DEXTROSE MONOHYDRATE 25 G/50ML
25 INJECTION, SOLUTION INTRAVENOUS PRN
Status: DISCONTINUED | OUTPATIENT
Start: 2022-07-23 | End: 2022-07-24 | Stop reason: HOSPADM

## 2022-07-23 RX ORDER — ENOXAPARIN SODIUM 100 MG/ML
30 INJECTION SUBCUTANEOUS EVERY 24 HOURS
Status: DISCONTINUED | OUTPATIENT
Start: 2022-07-23 | End: 2022-07-24 | Stop reason: HOSPADM

## 2022-07-23 RX ORDER — ASPIRIN 81 MG/1
81 TABLET, CHEWABLE ORAL DAILY
Status: DISCONTINUED | OUTPATIENT
Start: 2022-07-23 | End: 2022-07-24 | Stop reason: HOSPADM

## 2022-07-23 RX ORDER — ACETAMINOPHEN 325 MG/1
650 TABLET ORAL EVERY 4 HOURS PRN
Status: DISCONTINUED | OUTPATIENT
Start: 2022-07-23 | End: 2022-07-24 | Stop reason: HOSPADM

## 2022-07-23 RX ORDER — CARVEDILOL 25 MG/1
25 TABLET ORAL 2 TIMES DAILY WITH MEALS
Status: DISCONTINUED | OUTPATIENT
Start: 2022-07-23 | End: 2022-07-24 | Stop reason: HOSPADM

## 2022-07-23 RX ORDER — AMLODIPINE BESYLATE 5 MG/1
5 TABLET ORAL DAILY
Status: DISCONTINUED | OUTPATIENT
Start: 2022-07-23 | End: 2022-07-24 | Stop reason: HOSPADM

## 2022-07-23 RX ORDER — DEXTROSE MONOHYDRATE 25 G/50ML
12.5 INJECTION, SOLUTION INTRAVENOUS PRN
Status: DISCONTINUED | OUTPATIENT
Start: 2022-07-23 | End: 2022-07-24 | Stop reason: HOSPADM

## 2022-07-23 RX ORDER — CARVEDILOL 25 MG/1
25 TABLET ORAL 2 TIMES DAILY WITH MEALS
COMMUNITY
Start: 2022-07-03

## 2022-07-23 RX ORDER — ASPIRIN 81 MG/1
81 TABLET, CHEWABLE ORAL
COMMUNITY
Start: 2022-04-21

## 2022-07-23 RX ORDER — NICOTINE POLACRILEX 4 MG
30 LOZENGE BUCCAL PRN
Status: DISCONTINUED | OUTPATIENT
Start: 2022-07-23 | End: 2022-07-24 | Stop reason: HOSPADM

## 2022-07-23 RX ORDER — LISINOPRIL 10 MG/1
10 TABLET ORAL DAILY
COMMUNITY
Start: 2022-07-03

## 2022-07-23 RX ADMIN — ASPIRIN 81 MG CHEWABLE TABLET 81 MG: 81 TABLET CHEWABLE at 20:22

## 2022-07-23 RX ADMIN — SODIUM CHLORIDE: 9 INJECTION, SOLUTION INTRAVENOUS at 20:30

## 2022-07-23 RX ADMIN — CARVEDILOL 25 MG: 25 TABLET, FILM COATED ORAL at 20:22

## 2022-07-23 RX ADMIN — ENOXAPARIN SODIUM 30 MG: 30 INJECTION SUBCUTANEOUS at 20:26

## 2022-07-23 RX ADMIN — AMLODIPINE BESYLATE 5 MG: 5 TABLET ORAL at 20:23

## 2022-07-23 ASSESSMENT — COGNITIVE AND FUNCTIONAL STATUS - GENERAL
BECAUSE OF A PHYSICAL, MENTAL, OR EMOTIONAL CONDITION, DO YOU HAVE DIFFICULTY DOING ERRANDS ALONE: NO
BECAUSE OF A PHYSICAL, MENTAL, OR EMOTIONAL CONDITION, DO YOU HAVE SERIOUS DIFFICULTY CONCENTRATING, REMEMBERING OR MAKING DECISIONS: NO
ARE YOU DEAF OR DO YOU HAVE SERIOUS DIFFICULTY  HEARING: NO
DO YOU HAVE DIFFICULTY DRESSING OR BATHING: NO
ARE YOU BLIND OR DO YOU HAVE SERIOUS DIFFICULTY SEEING, EVEN WHEN WEARING GLASSES: NO
DO YOU HAVE SERIOUS DIFFICULTY WALKING OR CLIMBING STAIRS: NO

## 2022-07-23 ASSESSMENT — ACTIVITIES OF DAILY LIVING (ADL)
ADL_BEFORE_ADMISSION: INDEPENDENT
ADL_SHORT_OF_BREATH: NO
CHRONIC_PAIN_PRESENT: NO
RECENT_DECLINE_ADL: NO
ADL_SCORE: 12
MOBILITY_ASSIST_DEVICES: STANDARD WALKER

## 2022-07-23 ASSESSMENT — ENCOUNTER SYMPTOMS
BACK PAIN: 0
CONFUSION: 0
ABDOMINAL PAIN: 0
NUMBNESS: 0
ACTIVITY CHANGE: 0
NAUSEA: 0
POLYPHAGIA: 0
FACIAL SWELLING: 0
CHILLS: 0
HEADACHES: 0
VOMITING: 0
EYE PAIN: 0
SORE THROAT: 0
WHEEZING: 0
DIARRHEA: 0
COUGH: 0
FATIGUE: 1
WOUND: 0
EYE REDNESS: 0
EYE DISCHARGE: 0
BRUISES/BLEEDS EASILY: 0
FEVER: 0
SHORTNESS OF BREATH: 0
COLOR CHANGE: 0
POLYDIPSIA: 0
DIZZINESS: 0

## 2022-07-23 ASSESSMENT — PATIENT HEALTH QUESTIONNAIRE - PHQ9
SUM OF ALL RESPONSES TO PHQ9 QUESTIONS 1 AND 2: 0
IS PATIENT ABLE TO COMPLETE PHQ2 OR PHQ9: YES
CLINICAL INTERPRETATION OF PHQ2 SCORE: NO FURTHER SCREENING NEEDED
SUM OF ALL RESPONSES TO PHQ9 QUESTIONS 1 AND 2: 0
2. FEELING DOWN, DEPRESSED OR HOPELESS: NOT AT ALL
1. LITTLE INTEREST OR PLEASURE IN DOING THINGS: NOT AT ALL

## 2022-07-23 ASSESSMENT — COLUMBIA-SUICIDE SEVERITY RATING SCALE - C-SSRS
2. HAVE YOU ACTUALLY HAD ANY THOUGHTS OF KILLING YOURSELF?: NO
1. WITHIN THE PAST MONTH, HAVE YOU WISHED YOU WERE DEAD OR WISHED YOU COULD GO TO SLEEP AND NOT WAKE UP?: NO
IS THE PATIENT ABLE TO COMPLETE C-SSRS: YES
6. HAVE YOU EVER DONE ANYTHING, STARTED TO DO ANYTHING, OR PREPARED TO DO ANYTHING TO END YOUR LIFE?: NO

## 2022-07-23 ASSESSMENT — LIFESTYLE VARIABLES
HOW OFTEN DO YOU HAVE 6 OR MORE DRINKS ON ONE OCCASION: NEVER
HOW OFTEN DO YOU HAVE A DRINK CONTAINING ALCOHOL: NEVER
ALCOHOL_USE_STATUS: NO OR LOW RISK WITH VALIDATED TOOL
HOW MANY STANDARD DRINKS CONTAINING ALCOHOL DO YOU HAVE ON A TYPICAL DAY: 0,1 OR 2
AUDIT-C TOTAL SCORE: 0

## 2022-07-23 ASSESSMENT — PAIN SCALES - GENERAL
PAINLEVEL_OUTOF10: 0
PAINLEVEL_OUTOF10: 0

## 2022-07-24 ENCOUNTER — APPOINTMENT (OUTPATIENT)
Dept: CARDIOLOGY | Age: 87
End: 2022-07-24
Attending: INTERNAL MEDICINE

## 2022-07-24 VITALS
DIASTOLIC BLOOD PRESSURE: 50 MMHG | SYSTOLIC BLOOD PRESSURE: 134 MMHG | WEIGHT: 93.25 LBS | HEIGHT: 57 IN | HEART RATE: 59 BPM | RESPIRATION RATE: 16 BRPM | OXYGEN SATURATION: 96 % | TEMPERATURE: 97.7 F | BODY MASS INDEX: 20.12 KG/M2

## 2022-07-24 PROBLEM — R55 SYNCOPE, UNSPECIFIED SYNCOPE TYPE: Status: RESOLVED | Noted: 2022-07-23 | Resolved: 2022-07-24

## 2022-07-24 LAB
ANION GAP SERPL CALC-SCNC: 7 MMOL/L (ref 7–19)
AORTIC VALVE AREA: NORMAL
ASCENDING AORTA (AAD): 2.6
AV MEAN GRADIENT (AVMG): 5
AV MEAN VELOCITY (AVMV): 1.08
AV PEAK GRADIENT (AVPG): 8
AV PEAK VELOCITY (AVPV): 1.4
AV STENOSIS SEVERITY TEXT: NORMAL
BUN SERPL-MCNC: 16 MG/DL (ref 6–20)
BUN/CREAT SERPL: 23 (ref 7–25)
CALCIUM SERPL-MCNC: 8.5 MG/DL (ref 8.4–10.2)
CHLORIDE SERPL-SCNC: 109 MMOL/L (ref 97–110)
CO2 SERPL-SCNC: 31 MMOL/L (ref 21–32)
CREAT SERPL-MCNC: 0.71 MG/DL (ref 0.51–0.95)
DEPRECATED RDW RBC: 37.3 FL (ref 39–50)
DOP CALC LVOT PEAK VEL (LVOTPV): 0.76
E WAVE DECELARATION TIME (MDT): 288
ERYTHROCYTE [DISTWIDTH] IN BLOOD: 13.2 % (ref 11–15)
EST RIGHT VENT SYSTOLIC PRESSURE BY TRICUSPID REGURGITATION JET (RVSP): 31.81
FASTING DURATION TIME PATIENT: NORMAL H
GFR SERPLBLD BASED ON 1.73 SQ M-ARVRAT: 79 ML/MIN
GLUCOSE BLDC GLUCOMTR-MCNC: 114 MG/DL (ref 70–99)
GLUCOSE SERPL-MCNC: 91 MG/DL (ref 70–99)
HCT VFR BLD CALC: 35.9 % (ref 36–46.5)
HGB BLD-MCNC: 11.3 G/DL (ref 12–15.5)
LEFT INTERNAL DIMENSION IN SYSTOLE (LVSD): 2.71
LEFT VENTRICULAR INTERNAL DIMENSION IN DIASTOLE (LVDD): 4.46
LV EF: NORMAL %
LVOT VTI (LVOTVTI): 20.4
MAGNESIUM SERPL-MCNC: 2 MG/DL (ref 1.7–2.4)
MCH RBC QN AUTO: 24.5 PG (ref 26–34)
MCHC RBC AUTO-ENTMCNC: 31.5 G/DL (ref 32–36.5)
MCV RBC AUTO: 77.9 FL (ref 78–100)
MV E TISSUE VEL LAT (MELV): 10
MV E TISSUE VEL MED (MESV): 7.51
MV E WAVE VEL/E TISSUE VEL MED(MSR): 10.81
NRBC BLD MANUAL-RTO: 0 /100 WBC
PLATELET # BLD AUTO: 202 K/MCL (ref 140–450)
POTASSIUM SERPL-SCNC: 3.8 MMOL/L (ref 3.4–5.1)
RBC # BLD: 4.61 MIL/MCL (ref 4–5.2)
SODIUM SERPL-SCNC: 143 MMOL/L (ref 135–145)
TRICUSPID VALVE ANNULAR PEAK VELOCITY (TVAPV): 10.7
WBC # BLD: 4.6 K/MCL (ref 4.2–11)

## 2022-07-24 PROCEDURE — 82962 GLUCOSE BLOOD TEST: CPT

## 2022-07-24 PROCEDURE — G0378 HOSPITAL OBSERVATION PER HR: HCPCS

## 2022-07-24 PROCEDURE — 36415 COLL VENOUS BLD VENIPUNCTURE: CPT | Performed by: HOSPITALIST

## 2022-07-24 PROCEDURE — 10002803 HB RX 637: Performed by: HOSPITALIST

## 2022-07-24 PROCEDURE — 93306 TTE W/DOPPLER COMPLETE: CPT

## 2022-07-24 PROCEDURE — 85027 COMPLETE CBC AUTOMATED: CPT | Performed by: HOSPITALIST

## 2022-07-24 PROCEDURE — 10004651 HB RX, NO CHARGE ITEM: Performed by: HOSPITALIST

## 2022-07-24 PROCEDURE — 10002807 HB RX 258: Performed by: HOSPITALIST

## 2022-07-24 PROCEDURE — 83735 ASSAY OF MAGNESIUM: CPT | Performed by: HOSPITALIST

## 2022-07-24 PROCEDURE — 80048 BASIC METABOLIC PNL TOTAL CA: CPT | Performed by: HOSPITALIST

## 2022-07-24 RX ORDER — POTASSIUM CHLORIDE 20 MEQ/1
40 TABLET, EXTENDED RELEASE ORAL ONCE
Status: COMPLETED | OUTPATIENT
Start: 2022-07-24 | End: 2022-07-24

## 2022-07-24 RX ADMIN — ASPIRIN 81 MG CHEWABLE TABLET 81 MG: 81 TABLET CHEWABLE at 09:02

## 2022-07-24 RX ADMIN — CARVEDILOL 25 MG: 25 TABLET, FILM COATED ORAL at 09:02

## 2022-07-24 RX ADMIN — LISINOPRIL 10 MG: 10 TABLET ORAL at 09:02

## 2022-07-24 RX ADMIN — AMLODIPINE BESYLATE 5 MG: 5 TABLET ORAL at 09:02

## 2022-07-24 RX ADMIN — PANTOPRAZOLE SODIUM 40 MG: 40 TABLET, DELAYED RELEASE ORAL at 05:28

## 2022-07-24 RX ADMIN — POTASSIUM CHLORIDE 40 MEQ: 1500 TABLET, EXTENDED RELEASE ORAL at 09:02

## 2022-07-24 RX ADMIN — SODIUM CHLORIDE: 9 INJECTION, SOLUTION INTRAVENOUS at 09:03

## 2022-07-24 ASSESSMENT — PAIN SCALES - GENERAL: PAINLEVEL_OUTOF10: 0

## 2022-07-24 ASSESSMENT — COGNITIVE AND FUNCTIONAL STATUS - GENERAL
BECAUSE OF A PHYSICAL, MENTAL, OR EMOTIONAL CONDITION, DO YOU HAVE SERIOUS DIFFICULTY CONCENTRATING, REMEMBERING OR MAKING DECISIONS: NO
BECAUSE OF A PHYSICAL, MENTAL, OR EMOTIONAL CONDITION, DO YOU HAVE DIFFICULTY DOING ERRANDS ALONE: NO
DO YOU HAVE DIFFICULTY DRESSING OR BATHING: NO
DO YOU HAVE SERIOUS DIFFICULTY WALKING OR CLIMBING STAIRS: NO

## 2023-03-30 ENCOUNTER — HOSPITAL ENCOUNTER (INPATIENT)
Age: 88
LOS: 1 days | Discharge: HOME HEALTH | DRG: 193 | End: 2023-04-01
Attending: EMERGENCY MEDICINE | Admitting: HOSPITALIST

## 2023-03-30 ENCOUNTER — APPOINTMENT (OUTPATIENT)
Dept: GENERAL RADIOLOGY | Age: 88
DRG: 193 | End: 2023-03-30
Attending: EMERGENCY MEDICINE

## 2023-03-30 DIAGNOSIS — J18.9 PNEUMONIA OF RIGHT LUNG DUE TO INFECTIOUS ORGANISM, UNSPECIFIED PART OF LUNG: Primary | ICD-10-CM

## 2023-03-30 LAB
ALBUMIN SERPL-MCNC: 3.3 G/DL (ref 3.6–5.1)
ALBUMIN/GLOB SERPL: 1.1 {RATIO} (ref 1–2.4)
ALP SERPL-CCNC: 100 UNITS/L (ref 45–117)
ALT SERPL-CCNC: 18 UNITS/L
ANION GAP SERPL CALC-SCNC: 9 MMOL/L (ref 7–19)
APPEARANCE UR: CLEAR
APTT PPP: 30 SEC (ref 22–30)
AST SERPL-CCNC: 25 UNITS/L
BACTERIA #/AREA URNS HPF: ABNORMAL /HPF
BASOPHILS # BLD: 0 K/MCL (ref 0–0.3)
BASOPHILS NFR BLD: 0 %
BILIRUB SERPL-MCNC: 0.9 MG/DL (ref 0.2–1)
BILIRUB UR QL STRIP: NEGATIVE
BUN SERPL-MCNC: 20 MG/DL (ref 6–20)
BUN/CREAT SERPL: 29 (ref 7–25)
CALCIUM SERPL-MCNC: 8.1 MG/DL (ref 8.4–10.2)
CHLORIDE SERPL-SCNC: 97 MMOL/L (ref 97–110)
CO2 SERPL-SCNC: 26 MMOL/L (ref 21–32)
COLOR UR: COLORLESS
CREAT SERPL-MCNC: 0.69 MG/DL (ref 0.51–0.95)
D DIMER PPP FEU-MCNC: 0.73 MG/L (FEU)
DEPRECATED RDW RBC: 37.4 FL (ref 39–50)
EOSINOPHIL # BLD: 0.1 K/MCL (ref 0–0.5)
EOSINOPHIL NFR BLD: 1 %
ERYTHROCYTE [DISTWIDTH] IN BLOOD: 13.2 % (ref 11–15)
FASTING DURATION TIME PATIENT: ABNORMAL H
FLUAV RNA RESP QL NAA+PROBE: NOT DETECTED
FLUBV RNA RESP QL NAA+PROBE: NOT DETECTED
GFR SERPLBLD BASED ON 1.73 SQ M-ARVRAT: 81 ML/MIN
GLOBULIN SER-MCNC: 3.1 G/DL (ref 2–4)
GLUCOSE BLDC GLUCOMTR-MCNC: 141 MG/DL (ref 70–99)
GLUCOSE BLDC GLUCOMTR-MCNC: 256 MG/DL (ref 70–99)
GLUCOSE SERPL-MCNC: 116 MG/DL (ref 70–99)
GLUCOSE UR STRIP-MCNC: NEGATIVE MG/DL
HCT VFR BLD CALC: 34 % (ref 36–46.5)
HGB BLD-MCNC: 11 G/DL (ref 12–15.5)
HGB UR QL STRIP: ABNORMAL
HYALINE CASTS #/AREA URNS LPF: ABNORMAL /LPF
IMM GRANULOCYTES # BLD AUTO: 0 K/MCL (ref 0–0.2)
IMM GRANULOCYTES # BLD: 0 %
INR PPP: 1
KETONES UR STRIP-MCNC: ABNORMAL MG/DL
LACTATE BLDV-SCNC: 0.6 MMOL/L (ref 0–2)
LEUKOCYTE ESTERASE UR QL STRIP: NEGATIVE
LYMPHOCYTES # BLD: 1.6 K/MCL (ref 1–4)
LYMPHOCYTES NFR BLD: 27 %
MAGNESIUM SERPL-MCNC: 1.7 MG/DL (ref 1.7–2.4)
MCH RBC QN AUTO: 25.1 PG (ref 26–34)
MCHC RBC AUTO-ENTMCNC: 32.4 G/DL (ref 32–36.5)
MCV RBC AUTO: 77.6 FL (ref 78–100)
MONOCYTES # BLD: 0.6 K/MCL (ref 0.3–0.9)
MONOCYTES NFR BLD: 10 %
NEUTROPHILS # BLD: 3.6 K/MCL (ref 1.8–7.7)
NEUTROPHILS NFR BLD: 62 %
NITRITE UR QL STRIP: NEGATIVE
NRBC BLD MANUAL-RTO: 0 /100 WBC
NT-PROBNP SERPL-MCNC: 1253 PG/ML
PH UR STRIP: 6.5 [PH] (ref 5–7)
PLATELET # BLD AUTO: 128 K/MCL (ref 140–450)
POTASSIUM SERPL-SCNC: 3.4 MMOL/L (ref 3.4–5.1)
POTASSIUM SERPL-SCNC: 4.7 MMOL/L (ref 3.4–5.1)
PROCALCITONIN SERPL IA-MCNC: 1.07 NG/ML
PROT SERPL-MCNC: 6.4 G/DL (ref 6.4–8.2)
PROT UR STRIP-MCNC: 30 MG/DL
PROTHROMBIN TIME: 9.9 SEC (ref 9.7–11.8)
RBC # BLD: 4.38 MIL/MCL (ref 4–5.2)
RBC #/AREA URNS HPF: ABNORMAL /HPF
RSV AG NPH QL IA.RAPID: NOT DETECTED
SARS-COV-2 RNA RESP QL NAA+PROBE: NOT DETECTED
SERVICE CMNT-IMP: NORMAL
SERVICE CMNT-IMP: NORMAL
SODIUM SERPL-SCNC: 129 MMOL/L (ref 135–145)
SP GR UR STRIP: 1.01 (ref 1–1.03)
SQUAMOUS #/AREA URNS HPF: ABNORMAL /HPF
TROPONIN I SERPL DL<=0.01 NG/ML-MCNC: 111 NG/L
TROPONIN I SERPL DL<=0.01 NG/ML-MCNC: 98 NG/L
UROBILINOGEN UR STRIP-MCNC: 0.2 MG/DL
WBC # BLD: 6 K/MCL (ref 4.2–11)
WBC #/AREA URNS HPF: ABNORMAL /HPF

## 2023-03-30 PROCEDURE — 96365 THER/PROPH/DIAG IV INF INIT: CPT

## 2023-03-30 PROCEDURE — 36415 COLL VENOUS BLD VENIPUNCTURE: CPT | Performed by: HOSPITALIST

## 2023-03-30 PROCEDURE — C9803 HOPD COVID-19 SPEC COLLECT: HCPCS

## 2023-03-30 PROCEDURE — 84484 ASSAY OF TROPONIN QUANT: CPT | Performed by: EMERGENCY MEDICINE

## 2023-03-30 PROCEDURE — 81001 URINALYSIS AUTO W/SCOPE: CPT | Performed by: EMERGENCY MEDICINE

## 2023-03-30 PROCEDURE — 96374 THER/PROPH/DIAG INJ IV PUSH: CPT

## 2023-03-30 PROCEDURE — 0241U COVID/FLU/RSV PANEL: CPT | Performed by: EMERGENCY MEDICINE

## 2023-03-30 PROCEDURE — 96372 THER/PROPH/DIAG INJ SC/IM: CPT

## 2023-03-30 PROCEDURE — 85025 COMPLETE CBC W/AUTO DIFF WBC: CPT | Performed by: EMERGENCY MEDICINE

## 2023-03-30 PROCEDURE — 10002807 HB RX 258: Performed by: EMERGENCY MEDICINE

## 2023-03-30 PROCEDURE — 83605 ASSAY OF LACTIC ACID: CPT | Performed by: EMERGENCY MEDICINE

## 2023-03-30 PROCEDURE — 71045 X-RAY EXAM CHEST 1 VIEW: CPT

## 2023-03-30 PROCEDURE — 10002800 HB RX 250 W HCPCS: Performed by: HOSPITALIST

## 2023-03-30 PROCEDURE — 84145 PROCALCITONIN (PCT): CPT | Performed by: EMERGENCY MEDICINE

## 2023-03-30 PROCEDURE — G0378 HOSPITAL OBSERVATION PER HR: HCPCS

## 2023-03-30 PROCEDURE — 83735 ASSAY OF MAGNESIUM: CPT | Performed by: EMERGENCY MEDICINE

## 2023-03-30 PROCEDURE — 10002803 HB RX 637: Performed by: INTERNAL MEDICINE

## 2023-03-30 PROCEDURE — 97161 PT EVAL LOW COMPLEX 20 MIN: CPT

## 2023-03-30 PROCEDURE — 82962 GLUCOSE BLOOD TEST: CPT

## 2023-03-30 PROCEDURE — 10004651 HB RX, NO CHARGE ITEM: Performed by: EMERGENCY MEDICINE

## 2023-03-30 PROCEDURE — 80053 COMPREHEN METABOLIC PANEL: CPT | Performed by: EMERGENCY MEDICINE

## 2023-03-30 PROCEDURE — 10002800 HB RX 250 W HCPCS: Performed by: EMERGENCY MEDICINE

## 2023-03-30 PROCEDURE — 87899 AGENT NOS ASSAY W/OPTIC: CPT | Performed by: HOSPITALIST

## 2023-03-30 PROCEDURE — 83880 ASSAY OF NATRIURETIC PEPTIDE: CPT | Performed by: EMERGENCY MEDICINE

## 2023-03-30 PROCEDURE — 84132 ASSAY OF SERUM POTASSIUM: CPT | Performed by: HOSPITALIST

## 2023-03-30 PROCEDURE — 93005 ELECTROCARDIOGRAM TRACING: CPT | Performed by: EMERGENCY MEDICINE

## 2023-03-30 PROCEDURE — 96375 TX/PRO/DX INJ NEW DRUG ADDON: CPT

## 2023-03-30 PROCEDURE — 85379 FIBRIN DEGRADATION QUANT: CPT | Performed by: EMERGENCY MEDICINE

## 2023-03-30 PROCEDURE — 85730 THROMBOPLASTIN TIME PARTIAL: CPT | Performed by: EMERGENCY MEDICINE

## 2023-03-30 PROCEDURE — 84484 ASSAY OF TROPONIN QUANT: CPT | Performed by: HOSPITALIST

## 2023-03-30 PROCEDURE — 10002803 HB RX 637: Performed by: HOSPITALIST

## 2023-03-30 PROCEDURE — 96361 HYDRATE IV INFUSION ADD-ON: CPT

## 2023-03-30 PROCEDURE — 85610 PROTHROMBIN TIME: CPT | Performed by: EMERGENCY MEDICINE

## 2023-03-30 PROCEDURE — 97530 THERAPEUTIC ACTIVITIES: CPT

## 2023-03-30 PROCEDURE — 87040 BLOOD CULTURE FOR BACTERIA: CPT | Performed by: EMERGENCY MEDICINE

## 2023-03-30 PROCEDURE — 99285 EMERGENCY DEPT VISIT HI MDM: CPT

## 2023-03-30 RX ORDER — ATORVASTATIN CALCIUM 10 MG/1
10 TABLET, FILM COATED ORAL NIGHTLY
Status: DISCONTINUED | OUTPATIENT
Start: 2023-03-30 | End: 2023-04-01 | Stop reason: HOSPADM

## 2023-03-30 RX ORDER — HEPARIN SODIUM 5000 [USP'U]/ML
5000 INJECTION, SOLUTION INTRAVENOUS; SUBCUTANEOUS EVERY 12 HOURS
Status: DISCONTINUED | OUTPATIENT
Start: 2023-03-30 | End: 2023-04-01 | Stop reason: HOSPADM

## 2023-03-30 RX ORDER — POTASSIUM CHLORIDE 20 MEQ/1
40 TABLET, EXTENDED RELEASE ORAL ONCE
Status: COMPLETED | OUTPATIENT
Start: 2023-03-30 | End: 2023-03-30

## 2023-03-30 RX ORDER — CEFAZOLIN SODIUM/WATER 1 G/10 ML
1000 SYRINGE (ML) INTRAVENOUS DAILY
Status: DISCONTINUED | OUTPATIENT
Start: 2023-03-31 | End: 2023-04-01 | Stop reason: HOSPADM

## 2023-03-30 RX ORDER — LANOLIN ALCOHOL/MO/W.PET/CERES
400 CREAM (GRAM) TOPICAL ONCE
Status: COMPLETED | OUTPATIENT
Start: 2023-03-30 | End: 2023-03-30

## 2023-03-30 RX ORDER — AMLODIPINE BESYLATE 5 MG/1
5 TABLET ORAL DAILY
Status: DISCONTINUED | OUTPATIENT
Start: 2023-03-31 | End: 2023-04-01 | Stop reason: HOSPADM

## 2023-03-30 RX ORDER — AMOXICILLIN 250 MG
2 CAPSULE ORAL DAILY PRN
Status: DISCONTINUED | OUTPATIENT
Start: 2023-03-30 | End: 2023-04-01 | Stop reason: HOSPADM

## 2023-03-30 RX ORDER — CEFAZOLIN SODIUM/WATER 2 G/20 ML
2000 SYRINGE (ML) INTRAVENOUS ONCE
Status: COMPLETED | OUTPATIENT
Start: 2023-03-30 | End: 2023-03-30

## 2023-03-30 RX ORDER — FLUTICASONE PROPIONATE AND SALMETEROL 500; 50 UG/1; UG/1
1 POWDER RESPIRATORY (INHALATION)
COMMUNITY

## 2023-03-30 RX ORDER — PANTOPRAZOLE SODIUM 40 MG/1
40 TABLET, DELAYED RELEASE ORAL
Status: DISCONTINUED | OUTPATIENT
Start: 2023-03-30 | End: 2023-04-01 | Stop reason: HOSPADM

## 2023-03-30 RX ORDER — HEPARIN SODIUM 5000 [USP'U]/ML
5000 INJECTION, SOLUTION INTRAVENOUS; SUBCUTANEOUS EVERY 8 HOURS SCHEDULED
Status: DISCONTINUED | OUTPATIENT
Start: 2023-03-30 | End: 2023-03-30

## 2023-03-30 RX ORDER — OMEPRAZOLE 20 MG/1
20 CAPSULE, DELAYED RELEASE ORAL DAILY
COMMUNITY

## 2023-03-30 RX ORDER — BENZONATATE 200 MG/1
200 CAPSULE ORAL 3 TIMES DAILY PRN
COMMUNITY

## 2023-03-30 RX ORDER — DEXAMETHASONE SODIUM PHOSPHATE 10 MG/ML
10 INJECTION, SOLUTION INTRAMUSCULAR; INTRAVENOUS ONCE
Status: COMPLETED | OUTPATIENT
Start: 2023-03-30 | End: 2023-03-30

## 2023-03-30 RX ORDER — NICOTINE POLACRILEX 4 MG
30 LOZENGE BUCCAL PRN
Status: DISCONTINUED | OUTPATIENT
Start: 2023-03-30 | End: 2023-04-01 | Stop reason: HOSPADM

## 2023-03-30 RX ORDER — ASPIRIN 81 MG/1
81 TABLET, CHEWABLE ORAL DAILY
Status: DISCONTINUED | OUTPATIENT
Start: 2023-03-31 | End: 2023-04-01 | Stop reason: HOSPADM

## 2023-03-30 RX ORDER — DEXTROSE MONOHYDRATE 25 G/50ML
12.5 INJECTION, SOLUTION INTRAVENOUS PRN
Status: DISCONTINUED | OUTPATIENT
Start: 2023-03-30 | End: 2023-04-01 | Stop reason: HOSPADM

## 2023-03-30 RX ORDER — PROCHLORPERAZINE MALEATE 5 MG/1
5 TABLET ORAL EVERY 4 HOURS PRN
Status: DISCONTINUED | OUTPATIENT
Start: 2023-03-30 | End: 2023-04-01 | Stop reason: HOSPADM

## 2023-03-30 RX ORDER — ALBUTEROL SULFATE 2.5 MG/3ML
10 SOLUTION RESPIRATORY (INHALATION) ONCE
Status: DISCONTINUED | OUTPATIENT
Start: 2023-03-30 | End: 2023-03-30

## 2023-03-30 RX ORDER — DOXYCYCLINE HYCLATE 100 MG/1
100 CAPSULE ORAL EVERY 12 HOURS SCHEDULED
Status: DISCONTINUED | OUTPATIENT
Start: 2023-03-31 | End: 2023-04-01 | Stop reason: HOSPADM

## 2023-03-30 RX ORDER — ACETAMINOPHEN 325 MG/1
650 TABLET ORAL EVERY 4 HOURS PRN
Status: DISCONTINUED | OUTPATIENT
Start: 2023-03-30 | End: 2023-04-01 | Stop reason: HOSPADM

## 2023-03-30 RX ORDER — POLYETHYLENE GLYCOL 3350 17 G/17G
17 POWDER, FOR SOLUTION ORAL DAILY PRN
Status: DISCONTINUED | OUTPATIENT
Start: 2023-03-30 | End: 2023-04-01 | Stop reason: HOSPADM

## 2023-03-30 RX ORDER — DEXTROSE MONOHYDRATE 25 G/50ML
25 INJECTION, SOLUTION INTRAVENOUS PRN
Status: DISCONTINUED | OUTPATIENT
Start: 2023-03-30 | End: 2023-04-01 | Stop reason: HOSPADM

## 2023-03-30 RX ORDER — LANOLIN ALCOHOL/MO/W.PET/CERES
400 CREAM (GRAM) TOPICAL ONCE
Status: COMPLETED | OUTPATIENT
Start: 2023-03-31 | End: 2023-03-31

## 2023-03-30 RX ORDER — 0.9 % SODIUM CHLORIDE 0.9 %
2 VIAL (ML) INJECTION EVERY 12 HOURS SCHEDULED
Status: DISCONTINUED | OUTPATIENT
Start: 2023-03-30 | End: 2023-04-01 | Stop reason: HOSPADM

## 2023-03-30 RX ORDER — CARVEDILOL 25 MG/1
25 TABLET ORAL 2 TIMES DAILY WITH MEALS
Status: DISCONTINUED | OUTPATIENT
Start: 2023-03-30 | End: 2023-04-01 | Stop reason: HOSPADM

## 2023-03-30 RX ORDER — NICOTINE POLACRILEX 4 MG
15 LOZENGE BUCCAL PRN
Status: DISCONTINUED | OUTPATIENT
Start: 2023-03-30 | End: 2023-04-01 | Stop reason: HOSPADM

## 2023-03-30 RX ADMIN — ATORVASTATIN CALCIUM 10 MG: 10 TABLET, FILM COATED ORAL at 21:44

## 2023-03-30 RX ADMIN — DEXAMETHASONE SODIUM PHOSPHATE 10 MG: 10 INJECTION INTRAMUSCULAR; INTRAVENOUS at 12:57

## 2023-03-30 RX ADMIN — POTASSIUM CHLORIDE 40 MEQ: 1500 TABLET, EXTENDED RELEASE ORAL at 18:53

## 2023-03-30 RX ADMIN — AZITHROMYCIN 500 MG: 500 INJECTION, POWDER, LYOPHILIZED, FOR SOLUTION INTRAVENOUS at 14:54

## 2023-03-30 RX ADMIN — PANTOPRAZOLE SODIUM 40 MG: 40 TABLET, DELAYED RELEASE ORAL at 21:44

## 2023-03-30 RX ADMIN — CARVEDILOL 25 MG: 25 TABLET, FILM COATED ORAL at 18:52

## 2023-03-30 RX ADMIN — Medication 2000 MG: at 14:35

## 2023-03-30 RX ADMIN — SODIUM CHLORIDE 1000 ML: 9 INJECTION, SOLUTION INTRAVENOUS at 11:48

## 2023-03-30 RX ADMIN — MAGNESIUM OXIDE TAB 400 MG (241.3 MG ELEMENTAL MG) 400 MG: 400 (241.3 MG) TAB at 18:53

## 2023-03-30 RX ADMIN — SODIUM CHLORIDE, PRESERVATIVE FREE 2 ML: 5 INJECTION INTRAVENOUS at 21:48

## 2023-03-30 RX ADMIN — HEPARIN SODIUM 5000 UNITS: 5000 INJECTION INTRAVENOUS; SUBCUTANEOUS at 21:44

## 2023-03-30 SDOH — ECONOMIC STABILITY: FOOD INSECURITY: HOW OFTEN IN THE PAST 12 MONTHS WERE YOU WORRIED OR STRESSED ABOUT HAVING ENOUGH MONEY TO BUY NUTRITIOUS MEALS?: NEVER

## 2023-03-30 SDOH — ECONOMIC STABILITY: TRANSPORTATION INSECURITY
IN THE PAST 12 MONTHS, HAS THE LACK OF TRANSPORTATION KEPT YOU FROM MEDICAL APPOINTMENTS OR FROM GETTING MEDICATIONS?: NO

## 2023-03-30 SDOH — HEALTH STABILITY: PHYSICAL HEALTH: DO YOU HAVE SERIOUS DIFFICULTY WALKING OR CLIMBING STAIRS?: YES

## 2023-03-30 SDOH — ECONOMIC STABILITY: HOUSING INSECURITY: WHAT IS YOUR LIVING SITUATION TODAY?: HOUSE

## 2023-03-30 SDOH — ECONOMIC STABILITY: HOUSING INSECURITY: WHAT IS YOUR LIVING SITUATION TODAY?: ADULT CHILDREN

## 2023-03-30 SDOH — HEALTH STABILITY: GENERAL
BECAUSE OF A PHYSICAL, MENTAL, OR EMOTIONAL CONDITION, DO YOU HAVE SERIOUS DIFFICULTY CONCENTRATING, REMEMBERING OR MAKING DECISIONS?: YES

## 2023-03-30 SDOH — HEALTH STABILITY: GENERAL: BECAUSE OF A PHYSICAL, MENTAL, OR EMOTIONAL CONDITION, DO YOU HAVE DIFFICULTY DOING ERRANDS ALONE?: YES

## 2023-03-30 SDOH — ECONOMIC STABILITY: HOUSING INSECURITY: ARE YOU WORRIED ABOUT LOSING YOUR HOUSING?: NO

## 2023-03-30 SDOH — HEALTH STABILITY: PHYSICAL HEALTH: DO YOU HAVE DIFFICULTY DRESSING OR BATHING?: YES

## 2023-03-30 SDOH — ECONOMIC STABILITY: GENERAL

## 2023-03-30 SDOH — SOCIAL STABILITY: SOCIAL NETWORK
HOW OFTEN DO YOU SEE OR TALK TO PEOPLE THAT YOU CARE ABOUT AND FEEL CLOSE TO? (FOR EXAMPLE: TALKING TO FRIENDS ON THE PHONE, VISITING FRIENDS OR FAMILY, GOING TO CHURCH OR CLUB MEETINGS): 5 OR MORE TIMES A WEEK

## 2023-03-30 SDOH — SOCIAL STABILITY: SOCIAL NETWORK: SUPPORT SYSTEMS: CHILDREN;FAMILY MEMBERS

## 2023-03-30 SDOH — ECONOMIC STABILITY: TRANSPORTATION INSECURITY
IN THE PAST 12 MONTHS, HAS LACK OF TRANSPORTATION KEPT YOU FROM MEETINGS, WORK, OR FROM GETTING THINGS NEEDED FOR DAILY LIVING?: NO

## 2023-03-30 ASSESSMENT — ENCOUNTER SYMPTOMS
EYE PAIN: 0
WOUND: 0
VOMITING: 0
DIZZINESS: 0
ABDOMINAL PAIN: 0
PAIN SEVERITY NOW: 0
SHORTNESS OF BREATH: 1
FEVER: 0
SEIZURES: 0
CHEST TIGHTNESS: 0
HEADACHES: 0
FEVER: 1
NUMBNESS: 0
CHILLS: 0
SORE THROAT: 0
ACTIVITY CHANGE: 0
FATIGUE: 1
PHOTOPHOBIA: 0
COUGH: 1
POLYDIPSIA: 0
CONFUSION: 0
DIARRHEA: 0
LIGHT-HEADEDNESS: 0
NAUSEA: 0
BACK PAIN: 0
BLOOD IN STOOL: 0
APNEA: 0
BRUISES/BLEEDS EASILY: 0

## 2023-03-30 ASSESSMENT — PAIN SCALES - GENERAL
PAINLEVEL_OUTOF10: 0
PAINLEVEL_OUTOF10: 0

## 2023-03-30 ASSESSMENT — ACTIVITIES OF DAILY LIVING (ADL)
EATING: INDEPENDENT
PRIOR_ADL_BATHING: MINIMAL ASSIST (MIN)
TOILETING: NEEDS ASSISTANCE
ADL_SCORE: 7
RECENT_DECLINE_ADL: NO
ADL_SHORT_OF_BREATH: NO
DRESSING: NEEDS ASSISTANCE
ADL_BEFORE_ADMISSION: NEEDS/REQUIRES ASSISTANCE
BATHING: NEEDS ASSISTANCE
FEEDING: INDEPENDENT

## 2023-03-30 ASSESSMENT — COLUMBIA-SUICIDE SEVERITY RATING SCALE - C-SSRS: IS THE PATIENT ABLE TO COMPLETE C-SSRS: NO, DEFER TO LATER TIME

## 2023-03-30 ASSESSMENT — COGNITIVE AND FUNCTIONAL STATUS - GENERAL
BASIC_MOBILITY_RAW_SCORE: 18
BASIC_MOBILITY_CONVERTED_SCORE: 41.05

## 2023-03-30 ASSESSMENT — PATIENT HEALTH QUESTIONNAIRE - PHQ9: IS PATIENT ABLE TO COMPLETE PHQ2 OR PHQ9: NO, DEFER TO LATER TIME

## 2023-03-30 ASSESSMENT — LIFESTYLE VARIABLES
ALCOHOL_USE_STATUS: NO OR LOW RISK WITH VALIDATED TOOL
HOW MANY STANDARD DRINKS CONTAINING ALCOHOL DO YOU HAVE ON A TYPICAL DAY: 0,1 OR 2
HOW OFTEN DO YOU HAVE A DRINK CONTAINING ALCOHOL: NEVER
AUDIT-C TOTAL SCORE: 0
HOW OFTEN DO YOU HAVE 6 OR MORE DRINKS ON ONE OCCASION: NEVER

## 2023-03-31 LAB
ANION GAP SERPL CALC-SCNC: 10 MMOL/L (ref 7–19)
ATRIAL RATE (BPM): 79
BASOPHILS # BLD: 0 K/MCL (ref 0–0.3)
BASOPHILS NFR BLD: 0 %
BUN SERPL-MCNC: 23 MG/DL (ref 6–20)
BUN/CREAT SERPL: 35 (ref 7–25)
CALCIUM SERPL-MCNC: 8.3 MG/DL (ref 8.4–10.2)
CHLORIDE SERPL-SCNC: 105 MMOL/L (ref 97–110)
CO2 SERPL-SCNC: 22 MMOL/L (ref 21–32)
CREAT SERPL-MCNC: 0.66 MG/DL (ref 0.51–0.95)
DEPRECATED RDW RBC: 36.6 FL (ref 39–50)
EOSINOPHIL # BLD: 0 K/MCL (ref 0–0.5)
EOSINOPHIL NFR BLD: 0 %
ERYTHROCYTE [DISTWIDTH] IN BLOOD: 13.2 % (ref 11–15)
FASTING DURATION TIME PATIENT: ABNORMAL H
GFR SERPLBLD BASED ON 1.73 SQ M-ARVRAT: 82 ML/MIN
GLUCOSE BLDC GLUCOMTR-MCNC: 160 MG/DL (ref 70–99)
GLUCOSE BLDC GLUCOMTR-MCNC: 165 MG/DL (ref 70–99)
GLUCOSE BLDC GLUCOMTR-MCNC: 174 MG/DL (ref 70–99)
GLUCOSE BLDC GLUCOMTR-MCNC: 184 MG/DL (ref 70–99)
GLUCOSE SERPL-MCNC: 163 MG/DL (ref 70–99)
HCT VFR BLD CALC: 37.1 % (ref 36–46.5)
HGB BLD-MCNC: 11.9 G/DL (ref 12–15.5)
HLA-DP+DQ+DR AB NFR SER: ABNORMAL %
L PNEUMO1 AG UR QL IA.RAPID: NORMAL
LYMPHOCYTES # BLD: 0.8 K/MCL (ref 1–4)
LYMPHOCYTES NFR BLD: 19 %
MAGNESIUM SERPL-MCNC: 2.1 MG/DL (ref 1.7–2.4)
MCH RBC QN AUTO: 24.5 PG (ref 26–34)
MCHC RBC AUTO-ENTMCNC: 32.1 G/DL (ref 32–36.5)
MCV RBC AUTO: 76.3 FL (ref 78–100)
MONOCYTES # BLD: 0.1 K/MCL (ref 0.3–0.9)
MONOCYTES NFR BLD: 2 %
NEUTROPHILS # BLD: 3.1 K/MCL (ref 1.8–7.7)
NEUTS BAND NFR BLD: 12 % (ref 0–10)
NEUTS SEG NFR BLD: 65 %
NRBC BLD MANUAL-RTO: 0 /100 WBC
P AXIS (DEGREES): 64
PLAT MORPH BLD: NORMAL
PLATELET # BLD AUTO: 141 K/MCL (ref 140–450)
POTASSIUM SERPL-SCNC: 4.4 MMOL/L (ref 3.4–5.1)
PR-INTERVAL (MSEC): 252
PROCALCITONIN SERPL IA-MCNC: 1.08 NG/ML
QRS-INTERVAL (MSEC): 138
QT-INTERVAL (MSEC): 440
QTC: 504
R AXIS (DEGREES): 76
RBC # BLD: 4.86 MIL/MCL (ref 4–5.2)
RBC MORPH BLD: NORMAL
REPORT TEXT: NORMAL
S PNEUM AG UR QL IA.RAPID: NORMAL
SODIUM SERPL-SCNC: 133 MMOL/L (ref 135–145)
T AXIS (DEGREES): 11
VARIANT LYMPHS NFR BLD: 2 % (ref 0–5)
VENTRICULAR RATE EKG/MIN (BPM): 79
WBC # BLD: 4 K/MCL (ref 4.2–11)

## 2023-03-31 PROCEDURE — 10002803 HB RX 637: Performed by: INTERNAL MEDICINE

## 2023-03-31 PROCEDURE — 85027 COMPLETE CBC AUTOMATED: CPT | Performed by: HOSPITALIST

## 2023-03-31 PROCEDURE — 10002800 HB RX 250 W HCPCS: Performed by: HOSPITALIST

## 2023-03-31 PROCEDURE — 97165 OT EVAL LOW COMPLEX 30 MIN: CPT

## 2023-03-31 PROCEDURE — G0378 HOSPITAL OBSERVATION PER HR: HCPCS

## 2023-03-31 PROCEDURE — 10002803 HB RX 637: Performed by: HOSPITALIST

## 2023-03-31 PROCEDURE — 96376 TX/PRO/DX INJ SAME DRUG ADON: CPT

## 2023-03-31 PROCEDURE — 10004651 HB RX, NO CHARGE ITEM: Performed by: EMERGENCY MEDICINE

## 2023-03-31 PROCEDURE — 80048 BASIC METABOLIC PNL TOTAL CA: CPT | Performed by: HOSPITALIST

## 2023-03-31 PROCEDURE — 96372 THER/PROPH/DIAG INJ SC/IM: CPT

## 2023-03-31 PROCEDURE — 13003289 HB OXYGEN THERAPY DAILY

## 2023-03-31 PROCEDURE — 10004651 HB RX, NO CHARGE ITEM: Performed by: HOSPITALIST

## 2023-03-31 PROCEDURE — 36415 COLL VENOUS BLD VENIPUNCTURE: CPT | Performed by: HOSPITALIST

## 2023-03-31 PROCEDURE — 82962 GLUCOSE BLOOD TEST: CPT

## 2023-03-31 PROCEDURE — 10006031 HB ROOM CHARGE TELEMETRY

## 2023-03-31 PROCEDURE — 10004180 HB COUNTER-TRANSPORT

## 2023-03-31 PROCEDURE — 84145 PROCALCITONIN (PCT): CPT | Performed by: HOSPITALIST

## 2023-03-31 PROCEDURE — 83735 ASSAY OF MAGNESIUM: CPT | Performed by: HOSPITALIST

## 2023-03-31 RX ORDER — HYDRALAZINE HYDROCHLORIDE 20 MG/ML
10 INJECTION INTRAMUSCULAR; INTRAVENOUS EVERY 6 HOURS PRN
Status: DISCONTINUED | OUTPATIENT
Start: 2023-03-31 | End: 2023-04-01 | Stop reason: HOSPADM

## 2023-03-31 RX ORDER — CYCLOBENZAPRINE HCL 5 MG
5 TABLET ORAL ONCE
Status: COMPLETED | OUTPATIENT
Start: 2023-03-31 | End: 2023-03-31

## 2023-03-31 RX ORDER — BENZONATATE 100 MG/1
100 CAPSULE ORAL 3 TIMES DAILY PRN
Status: DISCONTINUED | OUTPATIENT
Start: 2023-03-31 | End: 2023-04-01 | Stop reason: HOSPADM

## 2023-03-31 RX ADMIN — HYDRALAZINE HYDROCHLORIDE 10 MG: 20 INJECTION INTRAMUSCULAR; INTRAVENOUS at 21:00

## 2023-03-31 RX ADMIN — ACETAMINOPHEN 650 MG: 325 TABLET ORAL at 21:01

## 2023-03-31 RX ADMIN — CEFTRIAXONE SODIUM 1000 MG: 10 INJECTION, POWDER, FOR SOLUTION INTRAVENOUS at 09:22

## 2023-03-31 RX ADMIN — INSULIN LISPRO 1 UNITS: 100 INJECTION, SOLUTION INTRAVENOUS; SUBCUTANEOUS at 09:21

## 2023-03-31 RX ADMIN — ASPIRIN 81 MG CHEWABLE TABLET 81 MG: 81 TABLET CHEWABLE at 09:23

## 2023-03-31 RX ADMIN — CYCLOBENZAPRINE HYDROCHLORIDE 5 MG: 5 TABLET, FILM COATED ORAL at 22:21

## 2023-03-31 RX ADMIN — INSULIN LISPRO 1 UNITS: 100 INJECTION, SOLUTION INTRAVENOUS; SUBCUTANEOUS at 12:33

## 2023-03-31 RX ADMIN — HEPARIN SODIUM 5000 UNITS: 5000 INJECTION INTRAVENOUS; SUBCUTANEOUS at 21:00

## 2023-03-31 RX ADMIN — AMLODIPINE BESYLATE 5 MG: 5 TABLET ORAL at 09:23

## 2023-03-31 RX ADMIN — CARVEDILOL 25 MG: 25 TABLET, FILM COATED ORAL at 17:09

## 2023-03-31 RX ADMIN — DOXYCYCLINE 100 MG: 100 CAPSULE ORAL at 09:23

## 2023-03-31 RX ADMIN — INSULIN LISPRO 1 UNITS: 100 INJECTION, SOLUTION INTRAVENOUS; SUBCUTANEOUS at 17:40

## 2023-03-31 RX ADMIN — SODIUM CHLORIDE, PRESERVATIVE FREE 2 ML: 5 INJECTION INTRAVENOUS at 21:01

## 2023-03-31 RX ADMIN — HEPARIN SODIUM 5000 UNITS: 5000 INJECTION INTRAVENOUS; SUBCUTANEOUS at 09:23

## 2023-03-31 RX ADMIN — MAGNESIUM OXIDE TAB 400 MG (241.3 MG ELEMENTAL MG) 400 MG: 400 (241.3 MG) TAB at 03:46

## 2023-03-31 RX ADMIN — DOXYCYCLINE 100 MG: 100 CAPSULE ORAL at 21:01

## 2023-03-31 RX ADMIN — CARVEDILOL 25 MG: 25 TABLET, FILM COATED ORAL at 09:23

## 2023-03-31 RX ADMIN — ATORVASTATIN CALCIUM 10 MG: 10 TABLET, FILM COATED ORAL at 21:01

## 2023-03-31 RX ADMIN — ACETAMINOPHEN 650 MG: 325 TABLET ORAL at 17:09

## 2023-03-31 RX ADMIN — SODIUM CHLORIDE, PRESERVATIVE FREE 2 ML: 5 INJECTION INTRAVENOUS at 09:22

## 2023-03-31 ASSESSMENT — COGNITIVE AND FUNCTIONAL STATUS - GENERAL
HELP NEEDED FOR TOILETING: A LITTLE
DO YOU HAVE SERIOUS DIFFICULTY WALKING OR CLIMBING STAIRS: YES
DO YOU HAVE DIFFICULTY DRESSING OR BATHING: YES
DAILY_ACTIVITY_CONVERTED_SCORE: 40.22
HELP NEEDED DRESSING REGULAR LOWER BODY CLOTHING: A LOT
HELP NEEDED FOR BATHING: A LOT
DAILY_ACTIVITY_RAW_SCORE: 19

## 2023-03-31 ASSESSMENT — PATIENT HEALTH QUESTIONNAIRE - PHQ9
1. LITTLE INTEREST OR PLEASURE IN DOING THINGS: NOT AT ALL
2. FEELING DOWN, DEPRESSED OR HOPELESS: NOT AT ALL
SUM OF ALL RESPONSES TO PHQ9 QUESTIONS 1 AND 2: 0
CLINICAL INTERPRETATION OF PHQ2 SCORE: NO FURTHER SCREENING NEEDED
IS PATIENT ABLE TO COMPLETE PHQ2 OR PHQ9: YES
SUM OF ALL RESPONSES TO PHQ9 QUESTIONS 1 AND 2: 0

## 2023-03-31 ASSESSMENT — PAIN SCALES - GENERAL
PAINLEVEL_OUTOF10: 0
PAINLEVEL_OUTOF10: 5
PAINLEVEL_OUTOF10: 0
PAINLEVEL_OUTOF10: 6

## 2023-03-31 ASSESSMENT — PAIN SCALES - PAIN ASSESSMENT IN ADVANCED DEMENTIA (PAINAD)
BREATHING: NORMAL
BREATHING: NORMAL

## 2023-04-01 VITALS
HEART RATE: 73 BPM | HEIGHT: 55 IN | SYSTOLIC BLOOD PRESSURE: 167 MMHG | BODY MASS INDEX: 21.43 KG/M2 | RESPIRATION RATE: 20 BRPM | DIASTOLIC BLOOD PRESSURE: 60 MMHG | TEMPERATURE: 98.2 F | WEIGHT: 92.59 LBS | OXYGEN SATURATION: 94 %

## 2023-04-01 LAB
ANION GAP SERPL CALC-SCNC: 8 MMOL/L (ref 7–19)
BASOPHILS # BLD: 0 K/MCL (ref 0–0.3)
BASOPHILS NFR BLD: 0 %
BUN SERPL-MCNC: 20 MG/DL (ref 6–20)
BUN/CREAT SERPL: 30 (ref 7–25)
CALCIUM SERPL-MCNC: 8.4 MG/DL (ref 8.4–10.2)
CHLORIDE SERPL-SCNC: 103 MMOL/L (ref 97–110)
CO2 SERPL-SCNC: 25 MMOL/L (ref 21–32)
CREAT SERPL-MCNC: 0.67 MG/DL (ref 0.51–0.95)
DEPRECATED RDW RBC: 36.4 FL (ref 39–50)
EOSINOPHIL # BLD: 0 K/MCL (ref 0–0.5)
EOSINOPHIL NFR BLD: 0 %
ERYTHROCYTE [DISTWIDTH] IN BLOOD: 13.4 % (ref 11–15)
FASTING DURATION TIME PATIENT: ABNORMAL H
GFR SERPLBLD BASED ON 1.73 SQ M-ARVRAT: 81 ML/MIN
GLUCOSE BLDC GLUCOMTR-MCNC: 107 MG/DL (ref 70–99)
GLUCOSE BLDC GLUCOMTR-MCNC: 116 MG/DL (ref 70–99)
GLUCOSE BLDC GLUCOMTR-MCNC: 124 MG/DL (ref 70–99)
GLUCOSE SERPL-MCNC: 132 MG/DL (ref 70–99)
HCT VFR BLD CALC: 37.5 % (ref 36–46.5)
HGB BLD-MCNC: 11.9 G/DL (ref 12–15.5)
IMM GRANULOCYTES # BLD AUTO: 0 K/MCL (ref 0–0.2)
IMM GRANULOCYTES # BLD: 0 %
LYMPHOCYTES # BLD: 1.2 K/MCL (ref 1–4)
LYMPHOCYTES NFR BLD: 13 %
MCH RBC QN AUTO: 24.1 PG (ref 26–34)
MCHC RBC AUTO-ENTMCNC: 31.7 G/DL (ref 32–36.5)
MCV RBC AUTO: 76.1 FL (ref 78–100)
MONOCYTES # BLD: 0.4 K/MCL (ref 0.3–0.9)
MONOCYTES NFR BLD: 4 %
NEUTROPHILS # BLD: 7.8 K/MCL (ref 1.8–7.7)
NEUTROPHILS NFR BLD: 83 %
NRBC BLD MANUAL-RTO: 0 /100 WBC
PLATELET # BLD AUTO: 156 K/MCL (ref 140–450)
POTASSIUM SERPL-SCNC: 3.4 MMOL/L (ref 3.4–5.1)
POTASSIUM SERPL-SCNC: 4.2 MMOL/L (ref 3.4–5.1)
PROCALCITONIN SERPL IA-MCNC: 1.12 NG/ML
RBC # BLD: 4.93 MIL/MCL (ref 4–5.2)
SODIUM SERPL-SCNC: 133 MMOL/L (ref 135–145)
WBC # BLD: 9.5 K/MCL (ref 4.2–11)

## 2023-04-01 PROCEDURE — 94640 AIRWAY INHALATION TREATMENT: CPT

## 2023-04-01 PROCEDURE — 10004651 HB RX, NO CHARGE ITEM: Performed by: HOSPITALIST

## 2023-04-01 PROCEDURE — 80048 BASIC METABOLIC PNL TOTAL CA: CPT | Performed by: HOSPITALIST

## 2023-04-01 PROCEDURE — 10002016 HB COUNTER INCENTIVE SPIROMETRY

## 2023-04-01 PROCEDURE — 10004180 HB COUNTER-TRANSPORT

## 2023-04-01 PROCEDURE — 13001086 HB INCENTIVE SPIROMETER W INSTRUCT

## 2023-04-01 PROCEDURE — 10002803 HB RX 637: Performed by: HOSPITALIST

## 2023-04-01 PROCEDURE — 84132 ASSAY OF SERUM POTASSIUM: CPT | Performed by: HOSPITALIST

## 2023-04-01 PROCEDURE — 13003289 HB OXYGEN THERAPY DAILY

## 2023-04-01 PROCEDURE — 84145 PROCALCITONIN (PCT): CPT | Performed by: HOSPITALIST

## 2023-04-01 PROCEDURE — 10002800 HB RX 250 W HCPCS: Performed by: HOSPITALIST

## 2023-04-01 PROCEDURE — 10004651 HB RX, NO CHARGE ITEM: Performed by: EMERGENCY MEDICINE

## 2023-04-01 PROCEDURE — 85025 COMPLETE CBC W/AUTO DIFF WBC: CPT | Performed by: HOSPITALIST

## 2023-04-01 PROCEDURE — 36415 COLL VENOUS BLD VENIPUNCTURE: CPT | Performed by: HOSPITALIST

## 2023-04-01 RX ORDER — FLUTICASONE FUROATE AND VILANTEROL 200; 25 UG/1; UG/1
1 POWDER RESPIRATORY (INHALATION)
Status: DISCONTINUED | OUTPATIENT
Start: 2023-04-01 | End: 2023-04-01 | Stop reason: HOSPADM

## 2023-04-01 RX ORDER — POTASSIUM CHLORIDE 20 MEQ/1
40 TABLET, EXTENDED RELEASE ORAL ONCE
Status: COMPLETED | OUTPATIENT
Start: 2023-04-01 | End: 2023-04-01

## 2023-04-01 RX ORDER — LISINOPRIL 5 MG/1
10 TABLET ORAL DAILY
Status: DISCONTINUED | OUTPATIENT
Start: 2023-04-01 | End: 2023-04-01 | Stop reason: HOSPADM

## 2023-04-01 RX ORDER — ATORVASTATIN CALCIUM 10 MG/1
10 TABLET, FILM COATED ORAL NIGHTLY
Qty: 30 TABLET | Refills: 0 | Status: SHIPPED | OUTPATIENT
Start: 2023-04-01 | End: 2023-05-01

## 2023-04-01 RX ORDER — CEFPODOXIME PROXETIL 200 MG/1
200 TABLET, FILM COATED ORAL 2 TIMES DAILY
Qty: 8 TABLET | Refills: 0 | Status: SHIPPED | OUTPATIENT
Start: 2023-04-02 | End: 2023-04-06

## 2023-04-01 RX ORDER — DOXYCYCLINE HYCLATE 100 MG/1
100 CAPSULE ORAL EVERY 12 HOURS SCHEDULED
Qty: 6 CAPSULE | Refills: 0 | Status: SHIPPED | OUTPATIENT
Start: 2023-04-01 | End: 2023-04-04

## 2023-04-01 RX ADMIN — HYDRALAZINE HYDROCHLORIDE 10 MG: 20 INJECTION INTRAMUSCULAR; INTRAVENOUS at 04:40

## 2023-04-01 RX ADMIN — DOXYCYCLINE 100 MG: 100 CAPSULE ORAL at 09:29

## 2023-04-01 RX ADMIN — AMLODIPINE BESYLATE 5 MG: 5 TABLET ORAL at 09:29

## 2023-04-01 RX ADMIN — CARVEDILOL 25 MG: 25 TABLET, FILM COATED ORAL at 17:53

## 2023-04-01 RX ADMIN — SODIUM CHLORIDE, PRESERVATIVE FREE 2 ML: 5 INJECTION INTRAVENOUS at 09:26

## 2023-04-01 RX ADMIN — CEFTRIAXONE SODIUM 1000 MG: 10 INJECTION, POWDER, FOR SOLUTION INTRAVENOUS at 09:25

## 2023-04-01 RX ADMIN — ASPIRIN 81 MG CHEWABLE TABLET 81 MG: 81 TABLET CHEWABLE at 09:29

## 2023-04-01 RX ADMIN — ACETAMINOPHEN 650 MG: 325 TABLET ORAL at 06:04

## 2023-04-01 RX ADMIN — FLUTICASONE FUROATE AND VILANTEROL TRIFENATATE 1 PUFF: 200; 25 POWDER RESPIRATORY (INHALATION) at 12:40

## 2023-04-01 RX ADMIN — POTASSIUM CHLORIDE 40 MEQ: 1500 TABLET, EXTENDED RELEASE ORAL at 09:29

## 2023-04-01 RX ADMIN — CARVEDILOL 25 MG: 25 TABLET, FILM COATED ORAL at 09:29

## 2023-04-01 RX ADMIN — PANTOPRAZOLE SODIUM 40 MG: 40 TABLET, DELAYED RELEASE ORAL at 06:04

## 2023-04-01 RX ADMIN — LISINOPRIL 10 MG: 5 TABLET ORAL at 12:07

## 2023-04-01 ASSESSMENT — PAIN SCALES - GENERAL
PAINLEVEL_OUTOF10: 0
PAINLEVEL_OUTOF10: 0
PAINLEVEL_OUTOF10: 5

## 2023-04-01 ASSESSMENT — PAIN SCALES - PAIN ASSESSMENT IN ADVANCED DEMENTIA (PAINAD): BREATHING: NORMAL

## 2023-04-04 LAB
BACTERIA BLD CULT: NORMAL
BACTERIA BLD CULT: NORMAL

## 2024-05-26 ENCOUNTER — HOSPITAL ENCOUNTER (EMERGENCY)
Facility: HOSPITAL | Age: 89
Discharge: HOME | End: 2024-05-26
Attending: EMERGENCY MEDICINE

## 2024-05-26 ENCOUNTER — APPOINTMENT (OUTPATIENT)
Dept: ULTRASOUND IMAGING | Facility: HOSPITAL | Age: 89
End: 2024-05-26
Attending: EMERGENCY MEDICINE

## 2024-05-26 VITALS
WEIGHT: 97 LBS | DIASTOLIC BLOOD PRESSURE: 62 MMHG | RESPIRATION RATE: 16 BRPM | HEART RATE: 62 BPM | BODY MASS INDEX: 23 KG/M2 | OXYGEN SATURATION: 97 % | TEMPERATURE: 97 F | SYSTOLIC BLOOD PRESSURE: 140 MMHG

## 2024-05-26 DIAGNOSIS — M79.89 RIGHT LEG SWELLING: Primary | ICD-10-CM

## 2024-05-26 PROCEDURE — 99284 EMERGENCY DEPT VISIT MOD MDM: CPT

## 2024-05-26 PROCEDURE — 99283 EMERGENCY DEPT VISIT LOW MDM: CPT

## 2024-05-26 PROCEDURE — 93971 EXTREMITY STUDY: CPT | Performed by: EMERGENCY MEDICINE

## 2024-05-26 NOTE — ED PROVIDER NOTES
Patient Seen in: [facility] Emergency Department      History     Chief Complaint   Patient presents with    Swelling Edema     Stated Complaint: swelling to lower extremities, worse on right side. slip and fall 2 days prior.    Subjective:     HPI    89-year-old woman with history of CAD/angina, asthma and hypertension who comes in with right lower extremity swelling and discomfort.  This was noticed by residential home care over the past week.  She has never had a blood clot in the legs or lungs.  No chest pain or shortness of breath.  She does have chronic lower extremity swelling but no asymmetry.  She lives with her daughter.      Objective:   Past Medical History:    Arrhythmia    palpitations    Asthma    Benign neoplasm of kidney, except pelvis    no change 11/01 to 11/02    Chest pain, unspecified    1/01 stress echo neg    Cholelithiasis    asymptomatic    Esophageal reflux    12/01: EGD    Helicobacter pylori (H. pylori)    treated    HTN    lytes wnl 9/08    Irritable bowel syndrome    longstanding chronic abdominal pain    Osteoporosis    fosamax [id number] (SE)    Other and unspecified ovarian cyst    complex cyst (Dr. Cid)    Urge incontinence    Vitamin D deficiency              Past Surgical History:   Procedure Laterality Date    Colonoscopy,diagnostic  12/01    wnl    Injection, w/wo contrast, dx/therapeutic substance, epidural/subarachnoid; lumbar/sacral N/A 1/29/2016    Procedure: CAUDAL;  Surgeon: Daniel Mclean MD;  Location: [facility]    Patient documented not to have experienced any of the following events N/A 1/29/2016    Procedure: CAUDAL;  Surgeon: Daniel Mclean MD;  Location: [facility]    Patient withough preoperative order for iv antibiotic surgical site infection prophylaxis. 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   Tobacco Use    Smoking status: Never    Smokeless tobacco: Never   Substance and Sexual Activity    Alcohol use: No    Drug use: Never   Social History Narrative    Health Mnt:    Pap: 9/07, 2/10 Defer due to age    Mamm: 10/08, 1/10, ref 1/11    Breast exam: 9/08,5/09, 2/10, 1/11    DEXA:1/01, 2/05 , 2/10, 8/10 (ordered elsewhere)    Cholesterol: 10/05 (96), 2/10 Defer due to age    Colon: 12/01 (10 yrs) defer due to age     H/O:         calcium: no supplement/ Vit D as above    exercise:  walks,     Social Determinants of Health     Financial Resource Strain: Low Risk  (3/30/2023)    Received from [employer]    Financial Resource Strain     In the past year, have you or any family members you live with been unable to get any of the following when it was really needed? Check all that apply.: None   Food Insecurity: Not At Risk (3/30/2023)    Received from [employer]    Food Insecurity     RETIRE How often in the past 12 months would you say you are worried or stressed about having enough money to buy nutritious meals? : Never   Transportation Needs: No Transportation Needs (3/30/2023)    Received from [employer], [facility]    PRAPARE - Transportation     In the past 12 months, has lack of transportation kept you from medical appointments or from getting medications?: No     In the past 12 months, has lack of transportation kept you from meetings, work, or from getting things needed for daily living?: No   Social Connections: Low Risk  (3/30/2023)    Received from [facility]    Social Connections     How often do you see or talk to people that you care about and feel close to? (For example: talking to friends on the phone, visiting friends or family, going to [employer] or club meetings): 5 or more times a week               Review of Systems    Positive for stated complaint: swelling to lower extremities, worse on right side. slip and fall 2 days prior.  Other systems are as noted in HPI.  Constitutional and vital signs reviewed.      All other systems reviewed and negative except as noted above.    Physical Exam     ED Triage Vitals [05/26/24 1119]   /64   Pulse 60   Resp 16   Temp 97.2 °F (36.2 °C)   Temp src Temporal   SpO2 97 %   O2 Device None (Room air)       Current:/62   Pulse 62   Temp 97.2 °F (36.2 °C) (Temporal)   Resp 16   Wt 44 kg   SpO2 97%   BMI 23.39 kg/m²       General:  Vitals as listed.  No acute distress   HEENT: Sclerae anicteric.  Conjunctivae show no pallor.  Oropharynx clear, mucous membranes moist   Lungs: good air exchange and clear   Heart: regular rate rhythm and no murmur   Extremities: Right lower extremity larger in circumference compared to left.  No significant tenderness.  Bilateral moderate pretibial edema, normal peripheral pulses   Neuro: Alert oriented and nonfocal      ED Course   Labs Reviewed - No data to display  US VENOUS DOPPLER LEG RIGHT - DIAG IMG (CPT=93971)    Result Date: 5/26/2024  CONCLUSION:  No evidence of DVT in the right lower extremity.   LOCATION:  [facility]    Dictated by (CST): Randy Hopper MD on 5/26/2024 at 1:43 PM     Finalized by (CST): Randy Hopper MD on 5/26/2024 at 1:43 PM        ED COURSE and MDM     Sources of the medical history included the patient and her family members.    I reviewed prior external notes including duration by Dr. Washington when the patient was evaluated for hypertension, CAD, leg edema, and GERD.    Advised to use compression stockings, reduce salt intake, and keep legs elevated when at rest.    I have discussed with the patient the results of testing, differential diagnosis, and treatment plan. They expressed clear understanding of these instructions and agrees to the plan provided.    Disposition and Plan     Clinical  Impression:  1. Right leg swelling         Disposition:  Discharge  5/26/2024  1:48 pm    Follow-up:  Kyler Washington MD  [address] [facility] [address]  [phone]    Schedule an appointment as soon as possible for a visit in 3 day(s)          Medications Prescribed:  Discharge Medication List as of 5/26/2024  1:53 PM

## 2024-11-15 ENCOUNTER — APPOINTMENT (OUTPATIENT)
Dept: CT IMAGING | Facility: HOSPITAL | Age: 89
End: 2024-11-15
Attending: EMERGENCY MEDICINE
Payer: MEDICARE

## 2024-11-15 ENCOUNTER — HOSPITAL ENCOUNTER (INPATIENT)
Facility: HOSPITAL | Age: 89
LOS: 4 days | Discharge: HOME HEALTH | End: 2024-11-20
Attending: EMERGENCY MEDICINE | Admitting: HOSPITALIST
Payer: MEDICARE

## 2024-11-15 ENCOUNTER — HOSPITAL ENCOUNTER (INPATIENT)
Facility: HOSPITAL | Age: 89
Discharge: NURSING, REHAB OR LONG-TERM CARE | End: 2024-11-15
Attending: EMERGENCY MEDICINE | Admitting: HOSPITALIST
Payer: MEDICARE

## 2024-11-15 DIAGNOSIS — S32.000A COMPRESSION FRACTURE OF LUMBAR VERTEBRA, UNSPECIFIED LUMBAR VERTEBRAL LEVEL, INITIAL ENCOUNTER (HCC): Primary | ICD-10-CM

## 2024-11-15 PROBLEM — M54.9 BACK PAIN: Status: ACTIVE | Noted: 2024-11-15

## 2024-11-15 LAB
ALBUMIN SERPL-MCNC: 4.1 G/DL (ref 3.2–4.8)
ALBUMIN/GLOB SERPL: 1.3 {RATIO} (ref 1–2)
ALP LIVER SERPL-CCNC: 99 U/L
ALT SERPL-CCNC: 17 U/L
ANION GAP SERPL CALC-SCNC: 4 MMOL/L (ref 0–18)
AST SERPL-CCNC: 23 U/L (ref ?–34)
BASOPHILS # BLD AUTO: 0.02 X10(3) UL (ref 0–0.2)
BASOPHILS NFR BLD AUTO: 0.2 %
BILIRUB SERPL-MCNC: 0.7 MG/DL (ref 0.2–0.9)
BUN BLD-MCNC: 13 MG/DL (ref 9–23)
CALCIUM BLD-MCNC: 9.3 MG/DL (ref 8.7–10.4)
CHLORIDE SERPL-SCNC: 104 MMOL/L (ref 98–112)
CO2 SERPL-SCNC: 30 MMOL/L (ref 21–32)
CREAT BLD-MCNC: 0.7 MG/DL
EGFRCR SERPLBLD CKD-EPI 2021: 80 ML/MIN/1.73M2 (ref 60–?)
EOSINOPHIL # BLD AUTO: 0.05 X10(3) UL (ref 0–0.7)
EOSINOPHIL NFR BLD AUTO: 0.6 %
ERYTHROCYTE [DISTWIDTH] IN BLOOD BY AUTOMATED COUNT: 14.4 %
GLOBULIN PLAS-MCNC: 3.2 G/DL (ref 2–3.5)
GLUCOSE BLD-MCNC: 143 MG/DL (ref 70–99)
HCT VFR BLD AUTO: 34.3 %
HGB BLD-MCNC: 11.3 G/DL
IMM GRANULOCYTES # BLD AUTO: 0.04 X10(3) UL (ref 0–1)
IMM GRANULOCYTES NFR BLD: 0.5 %
LYMPHOCYTES # BLD AUTO: 1.36 X10(3) UL (ref 1–4)
LYMPHOCYTES NFR BLD AUTO: 15.7 %
MCH RBC QN AUTO: 24.7 PG (ref 26–34)
MCHC RBC AUTO-ENTMCNC: 32.9 G/DL (ref 31–37)
MCV RBC AUTO: 74.9 FL
MONOCYTES # BLD AUTO: 0.6 X10(3) UL (ref 0.1–1)
MONOCYTES NFR BLD AUTO: 6.9 %
NEUTROPHILS # BLD AUTO: 6.62 X10 (3) UL (ref 1.5–7.7)
NEUTROPHILS # BLD AUTO: 6.62 X10(3) UL (ref 1.5–7.7)
NEUTROPHILS NFR BLD AUTO: 76.1 %
OSMOLALITY SERPL CALC.SUM OF ELEC: 289 MOSM/KG (ref 275–295)
PLATELET # BLD AUTO: 176 10(3)UL (ref 150–450)
POTASSIUM SERPL-SCNC: 3.2 MMOL/L (ref 3.5–5.1)
PROT SERPL-MCNC: 7.3 G/DL (ref 5.7–8.2)
RBC # BLD AUTO: 4.58 X10(6)UL
SODIUM SERPL-SCNC: 138 MMOL/L (ref 136–145)
WBC # BLD AUTO: 8.7 X10(3) UL (ref 4–11)

## 2024-11-15 PROCEDURE — 72131 CT LUMBAR SPINE W/O DYE: CPT | Performed by: EMERGENCY MEDICINE

## 2024-11-15 RX ORDER — HYDROMORPHONE HYDROCHLORIDE 1 MG/ML
0.4 INJECTION, SOLUTION INTRAMUSCULAR; INTRAVENOUS; SUBCUTANEOUS EVERY 4 HOURS PRN
Status: DISCONTINUED | OUTPATIENT
Start: 2024-11-15 | End: 2024-11-20

## 2024-11-15 RX ORDER — HYDROMORPHONE HYDROCHLORIDE 1 MG/ML
0.2 INJECTION, SOLUTION INTRAMUSCULAR; INTRAVENOUS; SUBCUTANEOUS EVERY 4 HOURS PRN
Status: DISCONTINUED | OUTPATIENT
Start: 2024-11-15 | End: 2024-11-20

## 2024-11-15 RX ORDER — HYDROMORPHONE HYDROCHLORIDE 1 MG/ML
0.6 INJECTION, SOLUTION INTRAMUSCULAR; INTRAVENOUS; SUBCUTANEOUS EVERY 4 HOURS PRN
Status: DISCONTINUED | OUTPATIENT
Start: 2024-11-15 | End: 2024-11-20

## 2024-11-15 RX ORDER — ACETAMINOPHEN 325 MG/1
650 TABLET ORAL EVERY 6 HOURS PRN
Status: DISCONTINUED | OUTPATIENT
Start: 2024-11-15 | End: 2024-11-16

## 2024-11-15 RX ORDER — ENOXAPARIN SODIUM 100 MG/ML
40 INJECTION SUBCUTANEOUS DAILY
Status: DISCONTINUED | OUTPATIENT
Start: 2024-11-16 | End: 2024-11-17

## 2024-11-15 RX ORDER — MORPHINE SULFATE 4 MG/ML
2 INJECTION, SOLUTION INTRAMUSCULAR; INTRAVENOUS ONCE
Status: COMPLETED | OUTPATIENT
Start: 2024-11-15 | End: 2024-11-15

## 2024-11-16 LAB
GLUCOSE BLD-MCNC: 116 MG/DL (ref 70–99)
GLUCOSE BLD-MCNC: 132 MG/DL (ref 70–99)
MAGNESIUM SERPL-MCNC: 1.6 MG/DL (ref 1.6–2.6)
POTASSIUM SERPL-SCNC: 3.3 MMOL/L (ref 3.5–5.1)

## 2024-11-16 RX ORDER — ACETAMINOPHEN 500 MG
1000 TABLET ORAL EVERY 6 HOURS
Status: DISCONTINUED | OUTPATIENT
Start: 2024-11-16 | End: 2024-11-20

## 2024-11-16 RX ORDER — POLYETHYLENE GLYCOL 3350 17 G/17G
17 POWDER, FOR SOLUTION ORAL DAILY PRN
Status: DISCONTINUED | OUTPATIENT
Start: 2024-11-16 | End: 2024-11-20

## 2024-11-16 RX ORDER — OXYCODONE HYDROCHLORIDE 5 MG/1
5 TABLET ORAL EVERY 4 HOURS PRN
Status: DISCONTINUED | OUTPATIENT
Start: 2024-11-16 | End: 2024-11-20

## 2024-11-16 RX ORDER — BISACODYL 10 MG
10 SUPPOSITORY, RECTAL RECTAL
Status: DISCONTINUED | OUTPATIENT
Start: 2024-11-16 | End: 2024-11-20

## 2024-11-16 RX ORDER — ASPIRIN 81 MG/1
81 TABLET, CHEWABLE ORAL DAILY
Status: DISCONTINUED | OUTPATIENT
Start: 2024-11-16 | End: 2024-11-20

## 2024-11-16 RX ORDER — LISINOPRIL 10 MG/1
10 TABLET ORAL 2 TIMES DAILY
Status: DISCONTINUED | OUTPATIENT
Start: 2024-11-16 | End: 2024-11-20

## 2024-11-16 RX ORDER — POTASSIUM CHLORIDE 1.5 G/1.58G
40 POWDER, FOR SOLUTION ORAL ONCE
Status: COMPLETED | OUTPATIENT
Start: 2024-11-16 | End: 2024-11-16

## 2024-11-16 RX ORDER — SODIUM PHOSPHATE, DIBASIC AND SODIUM PHOSPHATE, MONOBASIC 7; 19 G/230ML; G/230ML
1 ENEMA RECTAL ONCE AS NEEDED
Status: DISCONTINUED | OUTPATIENT
Start: 2024-11-16 | End: 2024-11-20

## 2024-11-16 RX ORDER — TIZANIDINE 2 MG/1
2 TABLET ORAL 3 TIMES DAILY PRN
Status: DISCONTINUED | OUTPATIENT
Start: 2024-11-16 | End: 2024-11-20

## 2024-11-16 RX ORDER — CHOLECALCIFEROL (VITAMIN D3) 50 MCG
2000 TABLET ORAL DAILY
Status: DISCONTINUED | OUTPATIENT
Start: 2024-11-16 | End: 2024-11-20

## 2024-11-16 RX ORDER — PANTOPRAZOLE SODIUM 20 MG/1
20 TABLET, DELAYED RELEASE ORAL
Status: DISCONTINUED | OUTPATIENT
Start: 2024-11-16 | End: 2024-11-20

## 2024-11-16 RX ORDER — CARVEDILOL 12.5 MG/1
25 TABLET ORAL 2 TIMES DAILY WITH MEALS
Status: DISCONTINUED | OUTPATIENT
Start: 2024-11-16 | End: 2024-11-20

## 2024-11-16 RX ORDER — FLUTICASONE PROPIONATE AND SALMETEROL 500; 50 UG/1; UG/1
1 POWDER RESPIRATORY (INHALATION) 2 TIMES DAILY
Status: DISCONTINUED | OUTPATIENT
Start: 2024-11-16 | End: 2024-11-20

## 2024-11-16 RX ORDER — KETOROLAC TROMETHAMINE 15 MG/ML
15 INJECTION, SOLUTION INTRAMUSCULAR; INTRAVENOUS EVERY 6 HOURS
Status: DISPENSED | OUTPATIENT
Start: 2024-11-16 | End: 2024-11-18

## 2024-11-16 RX ORDER — DOCUSATE SODIUM 100 MG/1
100 CAPSULE, LIQUID FILLED ORAL 2 TIMES DAILY
Status: DISCONTINUED | OUTPATIENT
Start: 2024-11-16 | End: 2024-11-20

## 2024-11-16 RX ORDER — UBIDECARENONE 75 MG
100 CAPSULE ORAL DAILY
Status: DISCONTINUED | OUTPATIENT
Start: 2024-11-16 | End: 2024-11-20

## 2024-11-16 NOTE — H&P
.CC:   Chief Complaint   Patient presents with    Back Pain        PCP: Kyler Washington MD    History of Present Illness: Patient is a 89 year old female who presents with a few days of severe back pain, has had chronic back discomfort off and on but this became so intense that she had difficulty walking which she is normally able to do. Pt had outpt xray done that showed multiple compression fractures and she came in for further management. She had a recent pain in her R wrist that was attributed to arthritis- she was taking ibuprofen for this at home. No numbness/tingling/weakness in LE. Had a nl BM yesterday.       PMH  Past Medical History:    Arrhythmia    palpitations    Asthma    Benign neoplasm of kidney, except pelvis    no change 11/01 to 11/02    Chest pain, unspecified    1/01 stress echo neg    Cholelithiasis    asymptomatic    Esophageal reflux    12/01: EGD    Helicobacter pylori (H. pylori)    treated    HTN    lytes wnl 9/08    Irritable bowel syndrome    longstanding chronic abdominal pain    Osteoporosis    fosamax [id number] (SE)    Other and unspecified ovarian cyst    complex cyst (Dr. Cid)    Urge incontinence    Vitamin D deficiency        PSH  Past Surgical History:   Procedure Laterality Date    Colonoscopy,diagnostic  12/01    wnl    Injection, w/wo contrast, dx/therapeutic substance, epidural/subarachnoid; lumbar/sacral N/A 1/29/2016    Procedure: CAUDAL;  Surgeon: Daniel Mclean MD;  Location: [facility]    Patient documented not to have experienced any of the following events N/A 1/29/2016    Procedure: CAUDAL;  Surgeon: Daniel Mclean MD;  Location: [facility]    Patient withough preoperative order for iv antibiotic surgical site infection prophylaxis. N/A 1/29/2016    Procedure: CAUDAL;  Surgeon: Daniel Mclean MD;  Location: [facility]    Upper gi endoscopy,diagnosis  12/01    wnl        ALL:  Allergies[1]      Home Medications:  Medications Taking[2]      Soc Hx  Social History     Tobacco Use    Smoking status: Never    Smokeless tobacco: Never   Substance Use Topics    Alcohol use: No        Fam Hx  Family History   Problem Relation Age of Onset    Heart Disorder Mother         heart failure    Cancer Sister         colon cancer age 89       Review of Systems  Comprehensive ROS reviewed and negative except for what's stated above.       OBJECTIVE:  BP (!) 176/49 (BP Location: Left arm)   Pulse 74   Temp 98 °F (36.7 °C) (Oral)   Resp 16   Ht 4' 9\" (1.448 m)   Wt 95 lb (43.1 kg)   SpO2 95%   BMI 20.56 kg/m²     Gen- NAD, appears stated age  HEENT- NCAT, anicteric sclera, MMM, OP clear  Lymph- no cervical LAD  CV- RRR no murmurs. No JOEL  Lungs- CTAB, good respiratory effort  Abd- soft, ntnd, no organomegaly, BS+  Derm- no rashes  Neuro- able to raise legs off bed without pain, good strength b/l.       Diagnostic Data:    CBC/Chem  Recent Labs   Lab 11/15/24  2207   WBC 8.7   HGB 11.3*   MCV 74.9*   .0       Recent Labs   Lab 11/15/24  2207 11/16/24  0418     --    K 3.2* 3.3*     --    CO2 30.0  --    BUN 13  --    CREATSERUM 0.70  --    *  --    CA 9.3  --    MG  --  1.6       Recent Labs   Lab 11/15/24  2207   ALT 17   AST 23   ALB 4.1       No results for input(s): \"TROP\" in the last 168 hours.        Radiology: CT SPINE LUMBAR (CPT=72131)    Result Date: 11/15/2024  PROCEDURE:  CT SPINE LUMBAR (CPT=72131)  COMPARISON:  None.  INDICATIONS:  patient coming in from DR office, they did xray and patient has cmpression fx  t12, L1L3  TECHNIQUE:  Noncontrast CT scanning is performed through the lumbar spine.  Multiplanar reconstructions are generated.  Dose reduction techniques were used. Dose information is transmitted to the ACR (American College of Radiology) NRDR (National Radiology Data Registry) which includes the Dose Index Registry.  PATIENT STATED HISTORY: (As transcribed by  Technologist)  Patient with back pain. Xray from doctor's office showed compression fracture.    FINDINGS:   There is levoconvex scoliosis of the lower lumbar spine.  The osseous structures are demineralized.  There is a severe compression fracture deformity of T12.  There is approximately 3 mm retropulsion of the superior endplate into the spinal canal.  There is approximately 80% loss of vertebral body height.  There is moderate compression fracture deformity of L1 with approximately 50% loss of vertebral body height.  Acute fracture lines are identified in the vertebral body.  There is approximately 3 mm retropulsion of the inferior endplate.  There is a large Schmorl's node identified in the inferior endplate of L2 with findings suggesting an inferior endplate compression deformity with no acute fracture line seen.  There is posterior endplate spurring of the inferior endplate and a disc osteophyte complex.  There is compression fracture deformity of L3 with approximately 55% loss of vertebral body height.  There is a disc osteophyte complex.  No retropulsion is seen.  There is a large Schmorl's node in the inferior endplate of L3 also.  No acute fracture lines are seen.  There is a mild superior compression fracture deformity of L4.  There is 3 mm anterior subluxation of L3 on 4. There is a disc osteophyte complex at L3-4 also.  No acute fracture seen at L5.  There is multilevel facet arthropathy seen throughout the lumbar spine.  There is no spondylolysis.  Atherosclerotic calcified plaque is seen throughout the visualized abdominal aorta and iliac arteries.  There is a large gallstone within the gallbladder measuring 19 mm in diameter.  Sclerotic changes are seen in the left iliac bone of indeterminate significance.    LUMBAR DISC LEVELS: L1-L2:  Retropulsion at L1-2 and facet arthropathy and diffuse bulging disc narrows the spinal canal to approximately 9 mm consistent with mild spinal canal stenosis.  There  is mild neural foraminal narrowing bilaterally.   L2-L3:  There is moderate right and mild to moderate left foraminal narrowing.  There is borderline central spinal canal stenosis.  AP diameter is 10 mm.  L3-L4:  There is bony central spinal canal stenosis.  The AP diameter measures 8 mm.  There is moderate to marked bilateral foraminal stenosis.    L4-L5:  There is central spinal canal stenosis.  AP diameter of the thecal sac measures approximately 8 mm .  There is moderate to marked right and mild left foraminal stenosis. L5-S1:  There is central spinal canal stenosis.  The AP diameter of the thecal sac measures approximately 6 mm.  There is moderate to marked canal stenosis seen bilaterally.  At T10-11 there is no central spinal canal stenosis or significant foraminal stenosis.  At T11-12 the retropulsion of the superior endplate creates canal stenosis measuring approximately 9 mm.  The neural foramina appear patent.             CONCLUSION:  There are compression fracture deformities of T12, L1, L2, L3 and L4.  Please see above for details.  The L1 compression fracture deformity is definitively new as there are acute fracture lines appreciated.  The other fractures are of indeterminate age. 2. Retropulsion of the superior endplate of T12 by approximately 3 mm. 3. Multilevel spinal canal stenosis and foraminal stenoses are noted as detailed above 4. Incidental large gallstone seen in the gallbladder lumen measuring 19 mm in diameter.  No obvious gallbladder wall thickening. 5. Osseous structures are demineralized. 6. Extensive atheromatous calcified plaque seen within the aorta, aortic branches and iliac arteries.    LOCATION:  [facility]   Dictated by (CST): Lanre Pitt MD on 11/15/2024 at 11:16 PM     Finalized by (CST): Lanre Pitt MD on 11/15/2024 at 11:33 PM          Available outpatient records reviewed--    ASSESSMENT / PLAN:     89 yo woman who presented with acute on chronic back pain and found  to have multiple compression fractures     Compression fractures- combination of acute/chronic at T12, L1-4 (L1 new)  - discussed with pt's family and pt's granddaughter who is a pediatrician   - no current concerning neurologic signs/sx  - will order TLSO brace while OOB  - start with scheduled high dose tylenol, scheduled low dose toradol + ppi, prn low dose oxycodone (plus bowel regimen), lidocaine patch  - pt/ot  - if any worsening exam or uncontrolled pain will ask spine surgery team to see  - given advanced age and multiple compression fractures, would suspect that conservative management would be best approach for now    HTN  - lisinopril, coreg    Scds, lovenox      Latonya Cyr MD  DM Hospitalist  Pager [phone]  Answering Service number: [phone]         [1]   Allergies  Allergen Reactions    No Known Allergies    [2]   Outpatient Medications Marked as Taking for the 11/15/24 encounter (Hospital Encounter)   Medication Sig Dispense Refill    ASPIRIN 81 MG Oral Chew Tab CHEW AND SWALLOW 1 TABLET(81 MG) BY MOUTH DAILY 90 tablet 0    OMEPRAZOLE 20 MG Oral Capsule Delayed Release TAKE 1 CAPSULE(20 MG) BY MOUTH EVERY MORNING BEFORE BREAKFAST 90 capsule 0    ONETOUCH ULTRA In Vitro Strip TEST BLOOD SUGAR ONCE DAILY 100 strip 0    Lancets (ONETOUCH DELICA PLUS [id number]) Does not apply Misc 1 each by Other route daily. 100 each 1    ADVAIR DISKUS 500-50 MCG/DOSE Inhalation Aerosol Powder, Breath Activated INHALE 1 PUFF INTO THE LUNGS TWICE DAILY 180 each 0    carvedilol 25 MG Oral Tab Take 1 tablet (25 mg total) by mouth 2 (two) times daily with meals. 180 tablet 3    lisinopril 10 MG Oral Tab Take 1 tablet (10 mg total) by mouth 2 (two) times daily. 180 tablet 3    tiZANidine HCl 2 MG Oral Cap Take 1 capsule (2 mg total) by mouth 3 (three) times daily as needed for Muscle spasms. 30 capsule 0    hydrOXYzine HCl 25 MG Oral Tab Take 1 tab PO 30 minutes before going to bed 30 tablet 2    Cholecalciferol  2000 units Oral Cap Take 2,000 Units by mouth daily.      Cyanocobalamin (B-12) 100 MCG Oral Tab Take 100 mcg by mouth daily.

## 2024-11-16 NOTE — ED PROVIDER NOTES
Patient Seen in: [facility] Emergency Department      History     Chief Complaint   Patient presents with    Back Pain     Stated Complaint: patient coming in from DR office, they did xray and patient has cmpression fx  *    Subjective:   HPI      History is obtained with the patient's family at bedside.  Patient started having a significant lower mid back pain this morning.  No falls or injuries.  No numbness or weakness.  No bowel or bladder incontinence.      With great difficulty got her immediate care, x-ray there showed multiple compression fractures.  It was noted there that her pain had worsened and she could no longer walk on her own so they are brought to the ER.    Objective:     Past Medical History:    Arrhythmia    palpitations    Asthma    Benign neoplasm of kidney, except pelvis    no change 11/01 to 11/02    Chest pain, unspecified    1/01 stress echo neg    Cholelithiasis    asymptomatic    Esophageal reflux    12/01: EGD    Helicobacter pylori (H. pylori)    treated    HTN    lytes wnl 9/08    Irritable bowel syndrome    longstanding chronic abdominal pain    Osteoporosis    fosamax [id number] (SE)    Other and unspecified ovarian cyst    complex cyst (Dr. Cid)    Urge incontinence    Vitamin D deficiency              Past Surgical History:   Procedure Laterality Date    Colonoscopy,diagnostic  12/01    wnl    Injection, w/wo contrast, dx/therapeutic substance, epidural/subarachnoid; lumbar/sacral N/A 1/29/2016    Procedure: CAUDAL;  Surgeon: Daniel Mclean MD;  Location: [facility]    Patient documented not to have experienced any of the following events N/A 1/29/2016    Procedure: CAUDAL;  Surgeon: Daniel Mclean MD;  Location: [facility]    Patient withough preoperative order for iv antibiotic surgical site infection prophylaxis. N/A 1/29/2016    Procedure: CAUDAL;  Surgeon: Daniel Mclean MD;  Location: [facility]     Upper gi endoscopy,diagnosis  12/01    wnl                Social History     Socioeconomic History    Marital status:    Occupational History    Occupation: [place]ian, lives with dtr   Tobacco Use    Smoking status: Never    Smokeless tobacco: Never   Vaping Use    Vaping status: Never Used   Substance and Sexual Activity    Alcohol use: No    Drug use: Never   Social History [facility]    Health Mnt:    Pap: 9/07, 2/10 Defer due to age    Mamm: 10/08, 1/10, ref 1/11    Breast exam: 9/08,5/09, 2/10, 1/11    DEXA:1/01, 2/05 , 2/10, 8/10 (ordered elsewhere)    Cholesterol: 10/05 (96), 2/10 Defer due to age    Colon: 12/01 (10 yrs) defer due to age     H/O:         calcium: no supplement/ Vit D as above    exercise:  walks,     Social Drivers of Health     Financial Resource Strain: Low Risk  (3/30/2023)    Received from [facility]    Financial Resource Strain     In the past year, have you or any family members you live with been unable to get any of the following when it was really needed? Check all that apply.: None   Food Insecurity: Not At Risk (3/30/2023)    Received from [facility]    Food Insecurity     RETIRE How often in the past 12 months would you say you are worried or stressed about having enough money to buy nutritious meals? : Never   Transportation Needs: No Transportation Needs (3/30/2023)    Received from [facility], [facility]    PRAPARE - Transportation     In the past 12 months, has lack of transportation kept you from medical appointments or from getting medications?: No     In the past 12 months, has lack of transportation kept you from meetings, work, or from getting things needed for daily living?: No   Social Connections: Low Risk  (3/30/2023)    Received from [facility]    Social Connections     How often do you see or talk to people that you care about and feel close to? (For example: talking to friends on the phone, visiting  friends or family, going to Denominational or club meetings): 5 or more times a week                  Physical Exam     ED Triage Vitals [11/15/24 2019]   BP (!) 187/55   Pulse 78   Resp 16   Temp 98 °F (36.7 °C)   Temp src Temporal   SpO2 93 %   O2 Device None (Room air)       Current Vitals:   Vital Signs  BP: (!) 187/55  Pulse: 78  Resp: 16  Temp: 98 °F (36.7 °C)  Temp src: Temporal  MAP (mmHg): 99    Oxygen Therapy  SpO2: 93 %  O2 Device: None (Room air)        Physical Exam  Physical Exam   Constitutional: Awake, alert, well appearing  Head: Normocephalic and atraumatic.   Eyes: Conjunctivae are normal. Pupils are equal, round, and reactive to light.   Neck: Normal range of motion. No JVD  Cardiovascular: Normal rate, regular rhythm  Pulmonary/Chest: Normal effort.  No accessory muscle use.  No cyanosis.  Abdominal: Soft. Not distended.  Neurological: Pt is alert and oriented to person, place, and time. no cranial nerve deficits. Speech fluent      There is tenderness to palpation over mid thoracic and lower lumbar spine.      5-5 strength with plantar flexion and at the EHL, no numbness in her lower extremities.          ED Course     Labs Reviewed   COMP METABOLIC PANEL (14) - Abnormal; Notable for the following components:       Result Value    Glucose 143 (*)     Potassium 3.2 (*)     All other components within normal limits   CBC WITH DIFFERENTIAL WITH PLATELET - Abnormal; Notable for the following components:    HGB 11.3 (*)     HCT 34.3 (*)     MCV 74.9 (*)     MCH 24.7 (*)     All other components within normal limits   BASIC METABOLIC PANEL (8)   MAGNESIUM   CBC WITH DIFFERENTIAL WITH PLATELET   RAINBOW DRAW BLUE   RAINBOW DRAW GOLD        Records from [facility] reviewed.  Compression fractures of T12, L1, L2, L3      Above most notably at T12.       MDM              Differential diagnoses considered: Multiple compression fractures, intractable back pain, dehydration, all considered.    -Patient has  intractable back pain leaving her unable to walk.  She needs admission for IV analgesia, physical therapy consultation and potentially placement.    Patient will be admitted primarily to the [facility] hospitalist.      I visualized the radiology studies, my independent interpretation: Mild compression fractures of indeterminate age noted on CT scan of lumbar spine.    *Discussion of ongoing management of this patient's care included: n/a  *Comorbidities contributing to the complexity of decision making: n/a  *External charts reviewed: n/a  *Additional sources of history: n/a    Shared decision making was done by: patient, myself.      Admission disposition: 11/15/2024 10:47 PM           Medical Decision Making      Disposition and Plan     Clinical Impression:  1. Compression fracture of lumbar vertebra, unspecified lumbar vertebral level, initial encounter ([facility])         Disposition:  Admit  11/15/2024 10:47 pm    Follow-up:  No follow-up provider specified.        Medications Prescribed:  Current Discharge Medication List              Supplementary Documentation:         Hospital Problems       Present on Admission  Date Reviewed: 3/21/2022            ICD-10-CM Noted POA    * (Principal) Compression fracture of lumbar vertebra, unspecified lumbar vertebral level, initial encounter ([facility]) S32.000A 11/15/2024 Unknown    Back pain M54.9 11/15/2024 Unknown

## 2024-11-16 NOTE — PLAN OF CARE
NURSING ADMISSION NOTE      Patient admitted via Cart  Oriented to room.  Safety precautions initiated.  Bed in low position.  Call light in reach.  Skin Check done with Charge RN

## 2024-11-16 NOTE — PLAN OF CARE
Pt A&Ox4. VSS on RA. Scd's on bilaterally. Telemetry monitoring, NSR. . Denies pain at this time. Paged MD for floor orders, waiting for response. Plan for PT/OT evals. Family at bedside. Call light within reach. Will continue to monitor.

## 2024-11-16 NOTE — ED INITIAL ASSESSMENT (HPI)
Pt arrives via EMS from MD office. Pt had x-ray done that show compression fr T12, L1, L3.   Pt unable to ambulate because of pain. Pt uses a walker to move around.   Pt sent here for further eval.

## 2024-11-16 NOTE — ED QUICK NOTES
Orders for admission, patient is aware of plan and ready to go upstairs. Any questions, please call ED RN omar at extension [phone].     Patient Covid vaccination status: Fully vaccinated     COVID Test Ordered in ED: None    COVID Suspicion at Admission: N/A    Running Infusions:  None    Mental Status/LOC at time of transport: axo1-2    Other pertinent information:   CIWA score: N/A   NIH score:  N/A

## 2024-11-16 NOTE — OCCUPATIONAL THERAPY NOTE
OT order received, chart reviewed. Patient admitted with multiple compression fractures. TLSO ordered. Will await brace prior to initiation of therapy. Will re-attempt as able.

## 2024-11-17 LAB
ANION GAP SERPL CALC-SCNC: 2 MMOL/L (ref 0–18)
BUN BLD-MCNC: 16 MG/DL (ref 9–23)
CALCIUM BLD-MCNC: 9 MG/DL (ref 8.7–10.4)
CHLORIDE SERPL-SCNC: 106 MMOL/L (ref 98–112)
CO2 SERPL-SCNC: 30 MMOL/L (ref 21–32)
CREAT BLD-MCNC: 0.73 MG/DL
EGFRCR SERPLBLD CKD-EPI 2021: 76 ML/MIN/1.73M2 (ref 60–?)
GLUCOSE BLD-MCNC: 127 MG/DL (ref 70–99)
OSMOLALITY SERPL CALC.SUM OF ELEC: 289 MOSM/KG (ref 275–295)
POTASSIUM SERPL-SCNC: 3.4 MMOL/L (ref 3.5–5.1)
SODIUM SERPL-SCNC: 138 MMOL/L (ref 136–145)

## 2024-11-17 RX ORDER — POTASSIUM CHLORIDE 1.5 G/1.58G
40 POWDER, FOR SOLUTION ORAL ONCE
Status: COMPLETED | OUTPATIENT
Start: 2024-11-17 | End: 2024-11-17

## 2024-11-17 RX ORDER — ENOXAPARIN SODIUM 100 MG/ML
30 INJECTION SUBCUTANEOUS DAILY
Status: DISCONTINUED | OUTPATIENT
Start: 2024-11-18 | End: 2024-11-20

## 2024-11-17 RX ORDER — CALCITONIN SALMON 200 [IU]/.09ML
1 SPRAY, METERED NASAL ONCE
Status: COMPLETED | OUTPATIENT
Start: 2024-11-17 | End: 2024-11-17

## 2024-11-17 RX ORDER — CALCITONIN SALMON 200 [IU]/.09ML
1 SPRAY, METERED NASAL DAILY
Status: DISCONTINUED | OUTPATIENT
Start: 2024-11-17 | End: 2024-11-17

## 2024-11-17 RX ORDER — HYDRALAZINE HYDROCHLORIDE 20 MG/ML
10 INJECTION INTRAMUSCULAR; INTRAVENOUS EVERY 4 HOURS PRN
Status: DISCONTINUED | OUTPATIENT
Start: 2024-11-17 | End: 2024-11-20

## 2024-11-17 NOTE — PLAN OF CARE
Alert and oriented x 4. VSS; on room air. Pain controlled with scheduled medication. Voiding without difficulty via purewick. Tolerating regular diet. Plan for PT/OT to see. POC discussed with patient. Safety precautions in place. Call light within reach.

## 2024-11-17 NOTE — PLAN OF CARE
Pt A&Ox4. VSS on RA. Scd's on bilaterally. Telemetry monitoring, NSR. . Denies pain at this time. Lidocaine patches applied to lower back. Plan for PT/OT evals. TLSO brace to be worn when OOB. Family at bedside. Call light within reach. Will continue to monitor.

## 2024-11-17 NOTE — OCCUPATIONAL THERAPY NOTE
OCCUPATIONAL THERAPY EVALUATION - INPATIENT     Room Number: 352/352-A  Evaluation Date: 11/17/2024  Type of Evaluation: Initial  Presenting Problem: T12, L1-L4 fxs    Physician Order: IP Consult to Occupational Therapy  Reason for Therapy: ADL/IADL Dysfunction and Discharge Planning    OCCUPATIONAL THERAPY ASSESSMENT   Baseline function: independent ADL, uses walker  Current function: min assist ADL, min-mod assist LB ADL, assist to don brace  Below baseline with ADL/transfers.   Deficits: endurance, pacing, use of adaptive techniques, knowledge of sternal precautions.    Continue OT in hospital.    Patient will benefit from continued skilled OT Services to promote return to prior level of function and safety with continuous assistance and gradual rehabilitative therapy    OT Device Recommendations: 3-in-1 commode    History: Patient is a 89 year old female admitted on 11/15/2024 with Presenting Problem: T12, L1-L4 fxs. Co-Morbidities : osteoporosis, HTN    WEIGHT BEARING RESTRICTION  Weight Bearing Restriction: None                Recommendations for nursing staff:   Transfers: one assist with RW and gait belt  Toileting location: bedside commode    EVALUATION SESSION:  ACTIVITY TOLERANCE: vitals wfl    COGNITION  Following Commands:  follows one step commands consistently    UPPER EXTREMITY  ROM: within functional limits except for the following:  L shoulder 2/3 range  Strength: not formally tested    EDUCATION PROVIDED  Patient Education : Role of Occupational Therapy; Plan of Care; Functional Transfer Techniques; Fall Prevention; Bracing Education Provided (spine precautions)  Patient's Response to Education: Verbalized Understanding (family translating)  Family/Caregiver Education : -- (same)  Family/Caregiver's Response to Education: Verbalized Understanding    PATIENT START OF SESSION: bedside chair, family translating  FUNCTIONAL TRANSFER ASSESSMENT  Sit to Stand: Chair  Chair: Contact Guard Assist  Commode  Transfer: Minimal Assist    BED MOBILITY  Rolling: Supervision  Supine to Sit : Not tested  Sit to Supine (OT): Minimal Assist    BALANCE ASSESSMENT     FUNCTIONAL ADL ASSESSMENT  Grooming Seated: -- (setup)      EQUIPMENT USED: RW, bedside commode  Demonstrates functional use, Would benefit from additional trial      THERAPIST COMMENTS: ADL/mobility as noted. Patient educated on donning and doffing brace, able to demonstrate with assist from daughter and occasional cueing. Family also educated on caring for brace and importance of maintaining skin integrity with good verbal understanding.    Sit to stand CGA from chair via RW. Transfer to bedside commode min (A) for steering RW. Assist for underpants up/down, completes anterior/posterior annika care without assist. CGA to stand from commode, Min (A) to EOB. Educated on log roll, min (A) to supine.       Patient End of Session: Needs met;In bed;Call light within reach;RN aware of session/findings;All patient questions and concerns addressed;Hospital anti-slip socks;SCDs in place;Alarm set;Family present    OCCUPATIONAL PROFILE    HOME SITUATION  Type of Home: House  Home Layout: Two level;Able to live on main level  Lives With: Family    Toilet and Equipment: Standard height toilet;3-in-1 commode  Shower/Tub and Equipment: Walk-in shower;Grab bar;Shower chair             Drives: No       Prior Level of Function: Pt/family reports independence with ADL and ambulation with walker prior to admit. No history of falls.       SUBJECTIVE   Pt states she doesn't have pain unless she's moving.     PAIN ASSESSMENT  Ratin  Location: back  Management Techniques: Repositioning    OBJECTIVE  Precautions: TLSO (on when OOB)  Fall Risk: High fall risk      ASSESSMENTS    AM-PAC ‘6-Clicks’ Inpatient Daily Activity Short Form  -   Putting on and taking off regular lower body clothing?: A Lot  -   Bathing (including washing, rinsing, drying)?: A Lot  -   Toileting, which includes  using toilet, bedpan or urinal? : A Lot  -   Putting on and taking off regular upper body clothing?: A Little  -   Taking care of personal grooming such as brushing teeth?: A Little  -   Eating meals?: None    AM-PAC Score:  Score: 16  Approx Degree of Impairment: 53.32%  Standardized Score (AM-PAC Scale): 35.96    ADDITIONAL TESTS     NEUROLOGICAL FINDINGS      COGNITION ASSESSMENTS       PLAN  OT Treatment Plan: Balance activities;ADL training;Functional transfer training;UE strengthening/ROM;Endurance training;Patient/Family education;Patient/Family training;Equipment eval/education;Compensatory technique education  Rehab Potential : Good  Frequency: 3-5x/week  Number of Visits to Meet Established Goals: 5    ADL Goals   Patient will perform lower body dressing:  with min assist  Patient will perform toileting: with stand by assist  Patient will don brace with family assist    Functional Transfer Goals  Patient will transfer from sit to supine:  with supervision  Patient will transfer from supine to sit:  with supervision  Patient will transfer to toilet:  with supervision    Additional Goals  Pt will maintain spine precautions during ADL routine without cueing    Patient Evaluation Complexity Level:   Occupational Profile/Medical History LOW - Brief history including review of medical or therapy records    Specific performance deficits impacting engagement in ADL/IADL LOW  1 - 3 performance deficits    Client Assessment/Performance Deficits LOW - No comorbidities nor modifications of tasks    Clinical Decision Making LOW - Analysis of occupational profile, problem-focused assessments, limited treatment options    Overall Complexity LOW     OT Session Time:  35 minutes  Self-Care Home Management: 15 minutes  Therapeutic Activity:  minutes  Neuromuscular Re-education:  minutes  Therapeutic Exercise:  minutes  Orthotic Management and Training:  minutes

## 2024-11-17 NOTE — PHYSICAL THERAPY NOTE
PHYSICAL THERAPY EVALUATION - INPATIENT     Room Number: 352/352-A  Evaluation Date: 11/17/2024  Type of Evaluation: Initial  Physician Order: PT Eval and Treat    Presenting Problem: Compresion fractures T12-L1,,L3  Co-Morbidities : Arthritis  Reason for Therapy: Mobility Dysfunction and Discharge Planning    PHYSICAL THERAPY ASSESSMENT   Patient is a 89 year old female admitted 11/15/2024 for Compresion fractures T12-L1,,L3. With pain radiating to B LE  Prior to admission, patient's baseline is Mod I in her bed mobilities and is amb with RW at home.  Patient is currently functioning below baseline with bed mobility, transfers, gait, maintaining seated position, standing prolonged periods, and performing household tasks.  Patient is requiring minimal assist <> maximal assist as a result of the following impairments: pain, impaired sitting and standing balance, decreased muscular endurance, difficulty maintaining precautions, and limited truncal ROM.  Physical Therapy will continue to follow for duration of hospitalization.    Patient will benefit from continued skilled PT Services to promote return to prior level of function and safety with continuous assistance and gradual rehabilitative therapy .    PLAN DURING HOSPITALIZATION  Nursing Mobility Recommendation : 1 Assist  PT Device Recommendation: Rolling walker  PT Treatment Plan: Bed mobility;Body mechanics;Patient education;Gait training;Strengthening;Stair training;Transfer training;Balance training;Range of motion;Family education  Rehab Potential : Good  Frequency (Obs): 3-5x/week     CURRENT GOALS    Goal #1 Patient is able to demonstrate supine - sit EOB @ level: supervision     Goal #2 Patient is able to demonstrate transfers Sit to/from Stand at assistance level: supervision     Goal #3 Patient is able to ambulate 50 feet with assist device: walker - rolling at assistance level: supervision     Goal #4    Goal #5    Goal #6    Goal Comments: Goals  established on 11/17/2024      PHYSICAL THERAPY MEDICAL/SOCIAL HISTORY  History related to current admission: Patient is a 89 year old female admitted on 11/15/2024 from home for LB and B LE pain.  Pt diagnosed with Compression fractures T12-L1,,L3 and is now prescribed to wear a TLSO    HOME SITUATION  Type of Home: House  Home Layout: Two level;Able to live on main level  Stairs to Enter : 2        Stairs to Bedroom: 0         Lives With: Family    Drives: No          Prior Level of Angelina: Mod I in her mobilities with use of RW as support , living with daughter who works and can be left intermittently at home. Speaks a [place]ian language , understand a little english but requires  with daughter/g. Daughter translating    SUBJECTIVE  Pain down to mu calf when walking    OBJECTIVE  Precautions: TLSO  Fall Risk: High fall risk    WEIGHT BEARING RESTRICTION     PAIN ASSESSMENT  Rating: Unable to rate  Location: LB area       COGNITION  Overall Cognitive Status:  WFL - within functional limits    RANGE OF MOTION AND STRENGTH ASSESSMENT  Upper extremity ROM and strength are within functional limits except for the following:      Lower extremity ROM is within functional limits     Lower extremity strength is within functional limits \    BALANCE  Static Sitting: Fair  Dynamic Sitting: Fair -  Static Standing: Fair -  Dynamic Standing: Poor +      ACTIVITY TOLERANCE; Poor due to pain       AM-PAC '6-Clicks' INPATIENT SHORT FORM - BASIC MOBILITY  How much difficulty does the patient currently have...  Patient Difficulty: Turning over in bed (including adjusting bedclothes, sheets and blankets)?: A Lot   Patient Difficulty: Sitting down on and standing up from a chair with arms (e.g., wheelchair, bedside commode, etc.): A Little   Patient Difficulty: Moving from lying on back to sitting on the side of the bed?: A Lot   How much help from another person does the patient currently need...   Help from Another:  Moving to and from a bed to a chair (including a wheelchair)?: A Little   Help from Another: Need to walk in hospital room?: A Little   Help from Another: Climbing 3-5 steps with a railing?: A Lot     AM-PAC Score:  Raw Score: 15   Approx Degree of Impairment: 57.7%   Standardized Score (AM-PAC Scale): 39.45   CMS Modifier (G-Code): CK    FUNCTIONAL ABILITY STATUS  Gait Assessment   Functional Mobility/Gait Assessment  Gait Assistance: Minimum assistance  Distance (ft): 15  Assistive Device: Rolling walker  Pattern: Shuffle (flexed posturing)    Skilled Therapy Provided     Bed Mobility:  Rolling: mod A  Supine to sit: mod A/max A   Sit to supine: NT     Transfer Mobility:  Sit to stand: mod A   Stand to sit: min A  Gait = min A with extra time RW as support    Therapist's Comments:   Pain limiting functional skills and is guarding requiring cueing and relaxation technique  Family at   Educated on donning/doffing TLSO and requiring re-education  Pt requires total assist with TLSO placement  Initiated education on bed mobilities technique using log rolling  Requires extra time with task due to pain and noted to be guarded  Gt training with TLSO RW in place, limited with pain radiating to B LE calf area  Discussed home situation  Family wants [facility] placement prior to home d/c if able   Left on the recliner and made comfortable with family at   Addressed all issues and concerns. Nursing is aware of this visit.      Exercise/Education Provided:  Bed mobility  Body mechanics  Don/Doff ortho/prosthesis  Functional activity tolerated  Gait training  Posture  Transfer training    Patient End of Session: Up in chair;Needs met;Call light within reach;RN aware of session/findings;All patient questions and concerns addressed;Bracing education provided per handout;Alarm set;Family present      Patient Evaluation Complexity Level:  History Moderate - 1 or 2 personal factors and/or co-morbidities   Examination of body systems  Moderate - addressing a total of 3 or more elements   Clinical Presentation  Moderate - Evolving   Clinical Decision Making Moderate Complexity       PT Session Time: 30 minutes  Gait Trainin minutes  Therapeutic Activity: 20 minutes

## 2024-11-17 NOTE — PROGRESS NOTES
.[facility] Hospitalist note    PCP: Kyler Washington MD    Chief Complaint:  F/u compression fracture    SUBJECTIVE:  Pain improved, able to work with PT and get up to chair using TLSO brace. Moving bowels.    OBJECTIVE:  Temp:  [97.8 °F (36.6 °C)-98.1 °F (36.7 °C)] 97.9 °F (36.6 °C)  Pulse:  [59-69] 59  Resp:  [17-20] 20  BP: (111-183)/(39-74) 111/61  SpO2:  [95 %-97 %] 95 %    Intake/Output:    Intake/Output Summary (Last 24 hours) at 11/17/2024 1033  Last data filed at 11/17/2024 0349  Gross per 24 hour   Intake --   Output 1000 ml   Net -1000 ml       Last 3 Weights   11/16/24 2200 95 lb (43.1 kg)   11/15/24 2019 95 lb (43.1 kg)   05/26/24 1123 97 lb (44 kg)   05/19/21 1103 97 lb (44 kg)       Exam  Gen: No acute distress  HEENT: anicteric sclera, MMM  Pulm: Lungs clear, normal respiratory effort  CV: Heart with regular rate and rhythm, no peripheral edema  Abd: Abdomen soft, nontender, nondistended, no organomegaly, bowel sounds present  MSK: Full range of motion in extremities, no clubbing, no cyanosis  Skin: no rashes or lesions  Neuro: A&OX3, no focal deficits    Data Review:         Labs:     Recent Labs   Lab 11/15/24  2207   WBC 8.7   HGB 11.3*   MCV 74.9*   .0   NE 6.62   LYMABS 1.36       Recent Labs   Lab 11/15/24  2207 11/16/24  0418 11/17/24  0523     --  138   K 3.2* 3.3* 3.4*     --  106   CO2 30.0  --  30.0   BUN 13  --  16   CREATSERUM 0.70  --  0.73   CA 9.3  --  9.0   MG  --  1.6  --    *  --  127*       Recent Labs   Lab 11/15/24  2207   ALT 17   AST 23   ALB 4.1       No results for input(s): \"TROP\", \"CK\", \"PBNP\", \"PCT\" in the last 168 hours.    No results for input(s): \"CRP\", \"DEVIN\", \"LDH\", \"DDIMER\" in the last 168 hours.    Recent Labs   Lab 11/16/24  1202 11/16/24  2107   PGLU 116* 132*       Meds:   Scheduled Medication:   fluticasone-salmeterol  1 puff Inhalation BID    aspirin  81 mg Oral Daily    carvedilol  25 mg Oral BID with meals    cholecalciferol  2,000 Units  Oral Daily    cyanocobalamin  100 mcg Oral Daily    lisinopril  10 mg Oral BID    pantoprazole  20 mg Oral QAM AC    acetaminophen  1,000 mg Oral q6h    ketorolac  15 mg Intravenous Q6H    lidocaine-menthol  2 patch Transdermal Daily    docusate sodium  100 mg Oral BID    enoxaparin  40 mg Subcutaneous Daily     Continuous Infusing Medication:  PRN Medication:  tiZANidine    oxyCODONE **OR** oxyCODONE    polyethylene glycol (PEG 3350)    magnesium hydroxide    bisacodyl    fleet enema    HYDROmorphone **OR** HYDROmorphone **OR** HYDROmorphone       Microbiology:    No results found for this visit on 11/15/24.    No results found for: \"COVID19\"     Assessment/Plan:     89 yo woman who presented with acute on chronic back pain and found to have multiple compression fractures      Compression fractures- combination of acute/chronic at T12, L1-4 (L1 new)  - discussed with pt's family and pt's granddaughter who is a pediatrician today again  - no current concerning neurologic signs/sx  - TLSO brace while OOB  - doing well with scheduled high dose tylenol, scheduled low dose toradol + ppi, lidocaine patches. Has not needed other narcotics prns. Discussed with patient's daughter, will give a dose of calcitonin for help with pain in the setting of acute compression fracture as there has been some evidence of benefit with this.  - Will also ask spine surgery service to see for any additional input, but anticipate that conservative management would be preferred over kyphoplasty if patient is doing okay with this strategy.  - PT and OT evals, may need [facility]  --bowel regimen     HTN  - lisinopril, coreg     Scds, lovenox           Latonya Cyr MD  [facility] Hospitalist  Pager: [phone]

## 2024-11-17 NOTE — HOME CARE LIAISON
Patient is current with [facility].  Please enter [facility] order upon discharge.  RN PT OT.

## 2024-11-18 LAB — POTASSIUM SERPL-SCNC: 3.6 MMOL/L (ref 3.5–5.1)

## 2024-11-18 PROCEDURE — 99222 1ST HOSP IP/OBS MODERATE 55: CPT

## 2024-11-18 NOTE — CM/SW NOTE
11/18/24 1412   Choice of Post-Acute Provider   Informed patient of right to choose their preferred provider Yes   List of appropriate post-acute services provided to patient/family with quality data Yes   Information given to Daughter;Son       Met with pt's family at bedside and provided list of accepting [facility] facilities.  Pt's dtr expressed interest in [facility].  They will review list to determine back up plan.  Pt's dtr planning to tour.      Referral re-opened in AIDIN and sent to [facility].  Await response. / to remain available for support and/or discharge planning.     Debra Jerome [facility]  Discharge Planner  [phone]    Addendum: [facility] does not accept Medicaid insurance.  Updated pt's grddtr at bedside.    Addendum:  Met with pt's family at bedside.  They stated preference for [facility] or [facility] as back up plan.  They are also considering bringing pt home with [facility] and family support.  Pt's grddtr already is approved as paid caregiver through the [facility] for 16 hs/week.  They plan to contact pt's  to try to increase hours.  They are also interested in hospital bed for home.  / to remain available for support and/or discharge planning.

## 2024-11-18 NOTE — PHYSICAL THERAPY NOTE
PHYSICAL THERAPY TREATMENT NOTE - INPATIENT    Room Number: 352/352-A     Session: 1     Number of Visits to Meet Established Goals: 5  Presenting Problem: Compresion fractures T12-L1,,L3  Co-Morbidities : osteoporosis, HTN    History related to current admission: Patient is a 89 year old female admitted on 11/15/2024 from home for LB and B LE pain.  Pt diagnosed with Compression fractures T12-L1,,L3 and is now prescribed to wear a TLSO     HOME SITUATION  Type of Home: House  Home Layout: Two level;Able to live on main level  Stairs to Enter : 2     Stairs to Bedroom: 0    Lives With: Family    Drives: No     Prior Level of [place]: Mod I in her mobilities with use of RW as support , living with daughter who works and can be left intermittently at home. Speaks a [place] language , understand a little english but requires  with daughter/g. Daughter translating    PHYSICAL THERAPY ASSESSMENT   Patient demonstrates limited progress this session, goals  remain in progress.      Patient is requiring minimal assist and moderate assist as a result of the following impairments: decreased functional strength, decreased endurance/aerobic capacity, pain, impaired static and dynamic standing balance, impaired coordination, impaired motor planning, and decreased muscular endurance.     Patient continues to function near baseline with bed mobility, transfers, gait, maintaining seated position, and standing prolonged periods.  Next session anticipate patient to progress bed mobility, transfers, and gait.  Physical Therapy will continue to follow patient for duration of hospitalization.    Patient continues to benefit from continued skilled PT services: to promote return to prior level of function and safety with continuous assistance and gradual rehabilitative therapy .    PLAN DURING HOSPITALIZATION  Nursing Mobility Recommendation : 1 Assist  PT Device Recommendation: Rolling walker  PT Treatment Plan: Bed  mobility;Body mechanics;Patient education;Gait training;Strengthening;Stair training;Transfer training;Balance training;Range of motion;Family education  Frequency (Obs): 3-5x/week     CURRENT GOALS     Goal #1 Patient is able to demonstrate supine - sit EOB @ level: supervision      Goal #2 Patient is able to demonstrate transfers Sit to/from Stand at assistance level: supervision      Goal #3 Patient is able to ambulate 50 feet with assist device: walker - rolling at assistance level: supervision      Goal #4     Goal #5     Goal #6     Goal Comments: Goals established on 11/17/2024 11/18/2024 all goals ongoing    SUBJECTIVE  \"Pain!\"     OBJECTIVE  Precautions: TLSO;Spine;Bed/chair alarm; needed (brace for OOB)    WEIGHT BEARING RESTRICTION     PAIN ASSESSMENT   Rating: Unable to rate  Location: low back  Management Techniques: Activity promotion;Body mechanics;Repositioning    BALANCE                                                                                                                       Static Sitting: Fair -  Dynamic Sitting: Poor +           Static Standing: Poor +  Dynamic Standing: Poor +    ACTIVITY TOLERANCE                         O2 WALK       AM-PAC '6-Clicks' INPATIENT SHORT FORM - BASIC MOBILITY  How much difficulty does the patient currently have...  Patient Difficulty: Turning over in bed (including adjusting bedclothes, sheets and blankets)?: A Lot   Patient Difficulty: Sitting down on and standing up from a chair with arms (e.g., wheelchair, bedside commode, etc.): A Little   Patient Difficulty: Moving from lying on back to sitting on the side of the bed?: A Lot   How much help from another person does the patient currently need...   Help from Another: Moving to and from a bed to a chair (including a wheelchair)?: A Little   Help from Another: Need to walk in hospital room?: A Lot   Help from Another: Climbing 3-5 steps with a railing?: A Lot     AM-PAC Score:  Raw Score:  14   Approx Degree of Impairment: 61.29%   Standardized Score (AM-PAC Scale): 38.1   CMS Modifier (G-Code): CL    FUNCTIONAL ABILITY STATUS  Gait Assessment   Functional Mobility/Gait Assessment  Gait Assistance: Minimum assistance;Moderate assistance  Distance (ft): 15  Assistive Device: Rolling walker  Pattern: Shuffle    Skilled Therapy Provided  Educated on spine precautions: no bending, lifting, twisting  Educated on use of TLSO brace when OOB   Daughter present throughout session to assist with interpretation as pt is [place]ian speaking    Bed mobility via log roll left> sidelying to sitting EOB> TLSO donned sitting EOB at totalA> sit to stand to RW> ambulated 15 feet with RW> upright in chair at end of session    Bed Mobility:  Rolling: log roll to left at modA   Supine<>Sit: modA   Sit<>Supine: NT     Transfer Mobility:  Sit<>Stand: Triny to RW- vc for hand placement   Stand<>Sit: Triny   Gait: min to modA with RW x 15 feet, shuffle, very short step length, decr gian. Requires RW navigation assistance     Therapist's Comments:   Patient presents sitting up in bed. Daughter present in room. Discussed role and goal of therapy in hospital setting. Pt in agreement to session       THERAPEUTIC EXERCISES  Lower Extremity Ankle pumps  Heel slides  SLR     Upper Extremity Elbow flex/ext,  - open/close, Shoulder flex/ext, and shoulder punches     Position Sitting     Repetitions   10   Sets   1     Patient End of Session: Up in chair;Needs met;Call light within reach;RN aware of session/findings;All patient questions and concerns addressed;Hospital anti-slip socks;Alarm set;Family present;Discussed recommendations with /    PT Session Time: 30 minutes  Gait Training: 10 minutes  Therapeutic Activity: 7 minutes  Therapeutic Exercise: 13 minutes

## 2024-11-18 NOTE — CONSULTS
[facility] Neurosurgery Consult    Vargas Power Patient Status:  Inpatient    6/15/1929 MRN [id number]   Formerly [facility] 3SW-A Attending Latonya Cyr MD   Hosp Day # 2 PCP Kyler Washington MD     REASON FOR CONSULTATION:  T12, L1, L2, L3, L4 compression fractures    HISTORY OF PRESENT ILLNESS:  Vargas Power is a(n) 89 year old female who presents to the ED on 11/15 with intractable back pain.  Lumbar spine CT performed in the ED demonstrated compression fracture deformities at T12, L1, L2, L3 and L4 for which neurosurgery was consulted.     At present, patient endorses 8-9/10 mid/low back pain.  She endorses some radiation of the pain into the left hip.  She denies any numbness or tingling of the extremities.  She denies any saddle anesthesia or urinary or bowel incontinence.  Patient reports significant difficulty with walking secondary to leg weakness.  She received a TLSO brace and has had some improvement with this.  The patient and her daughter deny any recent falls or trauma.  Patient's daughter reports that the patient had complained of back pain for the past couple of months but had acute exacerbation of pain last Friday prompting UC/ED visit.    Of note, the patient is [place]ian speaking and accompanied by her daughter who provides translation.    PAST MEDICAL HISTORY:  Past Medical History:    Arrhythmia    palpitations    Asthma    Benign neoplasm of kidney, except pelvis    no change  to     Chest pain, unspecified     stress echo neg    Cholelithiasis    asymptomatic    Esophageal reflux    : EGD    Helicobacter pylori (H. pylori)    treated    HTN    lytes wnl     Irritable bowel syndrome    longstanding chronic abdominal pain    Osteoporosis    fosamax [id number] (SE)    Other and unspecified ovarian cyst    complex cyst (Dr. Cid)    Urge incontinence    Vitamin D deficiency       PAST SURGICAL HISTORY:  Past Surgical History:   Procedure Laterality Date     Colonoscopy,diagnostic  12/01    wnl    Injection, w/wo contrast, dx/therapeutic substance, epidural/subarachnoid; lumbar/sacral N/A 1/29/2016    Procedure: CAUDAL;  Surgeon: Daniel Mclean MD;  [facility]: [facility]    Patient documented not to have experienced any of the following events N/A 1/29/2016    Procedure: CAUDAL;  Surgeon: Daniel Mclean MD;  Location: [facility]    Patient withough preoperative order for iv antibiotic surgical site infection prophylaxis. N/A 1/29/2016    Procedure: CAUDAL;  Surgeon: Daniel Mclean MD;  Location: [facility] PAIN MANA[facility]EMENT    Upper gi endoscopy,diagnosis  12/01    wn       FAMILY HISTORY:  family history includes Cancer in her sister; Heart Disorder in her mother.    SOCIAL HISTORY:   reports that she has never smoked. She has never used smokeless tobacco. She reports that she does not drink alcohol and does not use drugs.    ALLERGIES:  Allergies[1]    MEDICATIONS:  Prescriptions Prior to Admission[2]  Current Facility-Administered Medications   Medication Dose Route Frequency    enoxaparin (Lovenox) 30 MG/0.3ML SUBQ injection 30 mg  30 mg Subcutaneous Daily    hydrALAzine (Apresoline) 20 mg/mL injection 10 mg  10 mg Intravenous Q4H PRN    fluticasone-salmeterol (Advair Diskus) 500-50 MCG/ACT inhaler 1 puff  1 puff Inhalation BID    aspirin chewable tab 81 mg  81 mg Oral Daily    carvedilol (Coreg) tab 25 mg  25 mg Oral BID with meals    cholecalciferol (Vitamin D3) tab 2,000 Units  2,000 Units Oral Daily    cyanocobalamin (Vitamin B12) tab 100 mcg  100 mcg Oral Daily    lisinopril (Zestril) tab 10 mg  10 mg Oral BID    pantoprazole (Protonix) DR tab 20 mg  20 mg Oral QAM AC    tiZANidine (Zanaflex) tab 2 mg  2 mg Oral TID PRN    acetaminophen (Tylenol Extra Strength) tab 1,000 mg  1,000 mg Oral q6h    oxyCODONE immediate release partial tab 2.5 mg  2.5 mg Oral Q4H PRN    Or    oxyCODONE immediate release tab 5 mg  5  mg Oral Q4H PRN    lidocaine-menthol 4-1 % patch 2 patch  2 patch Transdermal Daily    docusate sodium (Colace) cap 100 mg  100 mg Oral BID    polyethylene glycol (PEG 3350) (Miralax) 17 g oral packet 17 g  17 g Oral Daily PRN    magnesium hydroxide (Milk of Magnesia) 400 MG/5ML oral suspension 30 mL  30 mL Oral Daily PRN    bisacodyl (Dulcolax) 10 MG rectal suppository 10 mg  10 mg Rectal Daily PRN    fleet enema (Fleet) rectal enema 133 mL  1 enema Rectal Once PRN    HYDROmorphone (Dilaudid) 1 MG/ML injection 0.2 mg  0.2 mg Intravenous Q4H PRN    Or    HYDROmorphone (Dilaudid) 1 MG/ML injection 0.4 mg  0.4 mg Intravenous Q4H PRN    Or    HYDROmorphone (Dilaudid) 1 MG/ML injection 0.6 mg  0.6 mg Intravenous Q4H PRN       REVIEW OF SYSTEMS:  Comprehensive Review of Systems obtained, and is negative other than that mentioned in the History of Present Illness.      PHYSICAL EXAMINATION:  VITAL SIGNS: /64   Pulse 71   Temp 98.6 °F (37 °C) (Oral)   Resp 20   Ht 57\"   Wt 95 lb (43.1 kg)   SpO2 98%   BMI 20.56 kg/m²   GENERAL:  Patient is a 89 year old female in no acute distress.  HEENT:  Normocephalic, atraumatic    NEUROLOGICAL:  This patient is awake and alert, answers all questions appropriately.  Sensation to light touch of the lower extremity intact bilaterally.    Lower extremity strength:      Iliospoas  Hamstrings  Quads  D-flexion  P-flexion     Right 5 5 5 5 5     Left 5 5 5 5 5        DIAGNOSTIC DATA:   Lab Results   Component Value Date    K 3.6 11/18/2024       IMAGING:  CT SPINE LUMBAR (CPT=72131)    Result Date: 11/15/2024  CONCLUSION:  There are compression fracture deformities of T12, L1, L2, L3 and L4.  Please see above for details.  The L1 compression fracture deformity is definitively new as there are acute fracture lines appreciated.  The other fractures are of indeterminate age. 2. Retropulsion of the superior endplate of T12 by approximately 3 mm. 3. Multilevel spinal canal stenosis  and foraminal stenoses are noted as detailed above 4. Incidental large gallstone seen in the gallbladder lumen measuring 19 mm in diameter.  No obvious gallbladder wall thickening. 5. Osseous structures are demineralized. 6. Extensive atheromatous calcified plaque seen within the aorta, aortic branches and iliac arteries.    LOCATION:  [facility]   Dictated by (CST): Lanre Pitt MD on 11/15/2024 at 11:16 PM     Finalized by (CST): Lanre Pitt MD on 11/15/2024 at 11:33 PM         ASSESSMENT:  89 y/o female with T12, L1, L2, L3, L4 compression fractures    Plan:    1.  Patient is neurologically intact.  No acute neurosurgical intervention indicated  2. Continue TLSO brace PRN for comfort   3. Obtain MRI lumbar spine   4. Discussed potential intervention with kyphoplasty if pain is uncontrolled with conservative measures   5. Pain control per primary team   6. PT/OT evals, encourage OOB, ambulation   7. Medical management per hospitalist     Patient seen and examined with patient's daughter at bedside and granddaughter via telephone. Patient and family agrees with the plan and all questions were answered. Discussed with Dr. Bailey who agrees with the plan.       Nereyda Olsen M.S., PA-C  [address], [place] [address]  [phone]  11/18/2024 2:55 PM           [1]   Allergies  Allergen Reactions    No Known Allergies    [2]   Medications Prior to Admission   Medication Sig Dispense Refill Last Dose/Taking    ASPIRIN 81 MG Oral Chew Tab CHEW AND SWALLOW 1 TABLET(81 MG) BY MOUTH DAILY 90 tablet 0 11/15/2024 Morning    OMEPRAZOLE 20 MG Oral Capsule Delayed Release TAKE 1 CAPSULE(20 MG) BY MOUTH EVERY MORNING BEFORE BREAKFAST 90 capsule 0 11/15/2024 Morning    ONETOUCH ULTRA In Vitro Strip TEST BLOOD SUGAR ONCE DAILY 100 strip 0 Taking    Lancets (ONETOUCH DELICA PLUS [id number]) Does not apply Misc 1 each by Other route daily. 100 each 1 Taking    ADVAIR DISKUS  500-50 MCG/DOSE Inhalation Aerosol Powder, Breath Activated INHALE 1 PUFF INTO THE LUNGS TWICE DAILY 180 each 0 11/15/2024 Morning    carvedilol 25 MG Oral Tab Take 1 tablet (25 mg total) by mouth 2 (two) times daily with meals. 180 tablet 3 11/15/2024 Morning    lisinopril 10 MG Oral Tab Take 1 tablet (10 mg total) by mouth 2 (two) times daily. 180 tablet 3 11/15/2024 Morning    tiZANidine HCl 2 MG Oral Cap Take 1 capsule (2 mg total) by mouth 3 (three) times daily as needed for Muscle spasms. 30 capsule 0 11/15/2024 Morning    hydrOXYzine HCl 25 MG Oral Tab Take 1 tab PO 30 minutes before going to bed 30 tablet 2 Taking    Cholecalciferol 2000 units Oral Cap Take 2,000 Units by mouth daily.   11/15/2024 Morning    Cyanocobalamin (B-12) 100 MCG Oral Tab Take 100 mcg by mouth daily.   11/15/2024 Morning    triamcinolone 0.1 % External Ointment APPLY TO THE AFFECTED AREA ON THE BODY TWICE DAILY 454 g 0 Unknown

## 2024-11-18 NOTE — PLAN OF CARE
Alert and oriented x 4. VSS; on room air. Pain controlled with scheduled medication. Voiding without difficulty via purewick. Tolerating regular diet. Plan for PT/OT rec HH. TLSO brace when out of bed. POC discussed with patient. Safety precautions in place. Call light within reach.     Patient's blood pressure @2120 was 195/92. @2122 bp was 200/83. Hosp was paged @ 2125 and @2142 stated prn meds will be ordered for blood pressure. Hydralazine administered @2212 and patient's blood pressure went down to 162/47 @2316. Monitoring BP.    Redness noted to LUE and chest. Patient's daughter stated she gave a \"cultural massage\" that they do in [place] to help patient's relax and relieve tension.

## 2024-11-18 NOTE — CM/SW NOTE
Department  notified of request for [facility] referrals started. Assigned CM/SW to follow up with pt/family on further discharge planning.     Christine Vargas, DSC

## 2024-11-18 NOTE — CM/SW NOTE
11/18/24 1000   CM/SW Referral Data   Referral Source Social Work (self-referral)   Reason for Referral Discharge planning   Informant EMR;Clinical Staff Member   Discharge Needs   Anticipated D/C needs Subacute rehab;Transportation services   Services Requested   Submitted to [facility] Yes   PASRR Level 1 Submitted Yes       Chart reviewed for discharge planning.  Patient is a 89 y/o woman admitted with compression fractures after a fall.  She is current with [facility].  Noted that therapy has indicated pt would benefit from inpatient rehab setting at [facility].  Referrals sent to [facility] facilities via AIDIN by DSC.  PASRR completed and added to referral.  Message sent to [facility] for review.  Await AIDIN responses for further [facility] planning.  Pt would admit to NH under Medicaid with therapies provided through her Medicare part B.  / to remain available for support and/or discharge planning.     Debra Jerome, [facility]  Discharge Planner  [phone]

## 2024-11-18 NOTE — PLAN OF CARE
A&Ox4. VSS, HTN noted. On room air. . Telemetry monitoring - NSR. SCDs on BLE. Ankle pumps encouraged. Tolerating regular diet. Last BM 11/17. Voiding freely via purewick. Pain controlled. Ambulating with mod assist. TLSO brace on when OOB. Plan is to dc to [facility] when medically clear. MRI pending. Patient/Family updated on plan of care. Safety precautions in place. Call light within reach.

## 2024-11-18 NOTE — PROGRESS NOTES
.[facility] Hospitalist note    PCP: Kyler Washington MD    Chief Complaint:  F/u compression fracture    SUBJECTIVE:  Pain c transferring. Some chronic abd discomfort, more c food/irritable bowels. Discussed pain meds and plan.     OBJECTIVE:  Temp:  [98 °F (36.7 °C)-98.6 °F (37 °C)] 98.6 °F (37 °C)  Pulse:  [71-83] 71  Resp:  [18-20] 20  BP: (130-183)/(47-64) 130/64  SpO2:  [97 %-98 %] 98 %    Intake/Output:    Intake/Output Summary (Last 24 hours) at 11/18/2024 1449  Last data filed at 11/18/2024 1058  Gross per 24 hour   Intake 640 ml   Output 1725 ml   Net -1085 ml       Last 3 Weights   11/16/24 2200 95 lb (43.1 kg)   11/15/24 2019 95 lb (43.1 kg)   05/26/24 1123 97 lb (44 kg)   05/19/21 1103 97 lb (44 kg)       Exam  Gen: No acute distress  HEENT: anicteric sclera, MMM  Pulm: Lungs clear, normal respiratory effort  CV: Heart with regular rate and rhythm, no peripheral edema  Abd: Abdomen soft, nontender, nondistended, no organomegaly, bowel sounds present  MSK: Full range of motion in extremities, no clubbing, no cyanosis  Skin: no rashes or lesions  Neuro: A&OX3, no focal deficits    Data Review:         Labs:     Recent Labs   Lab 11/15/24  2207   WBC 8.7   HGB 11.3*   MCV 74.9*   .0   NE 6.62   LYMABS 1.36       Recent Labs   Lab 11/15/24  2207 11/16/24  0418 11/17/24  0523 11/18/24  0430     --  138  --    K 3.2* 3.3* 3.4* 3.6     --  106  --    CO2 30.0  --  30.0  --    BUN 13  --  16  --    CREATSERUM 0.70  --  0.73  --    CA 9.3  --  9.0  --    MG  --  1.6  --   --    *  --  127*  --        Recent Labs   Lab 11/15/24  2207   ALT 17   AST 23   ALB 4.1       No results for input(s): \"TROP\", \"CK\", \"PBNP\", \"PCT\" in the last 168 hours.    No results for input(s): \"CRP\", \"DEVIN\", \"LDH\", \"DDIMER\" in the last 168 hours.    Recent Labs   Lab 11/16/24  1202 11/16/24  2107   PGLU 116* 132*       Meds:   Scheduled Medication:   enoxaparin  30 mg Subcutaneous Daily    fluticasone-salmeterol  1  puff Inhalation BID    aspirin  81 mg Oral Daily    carvedilol  25 mg Oral BID with meals    cholecalciferol  2,000 Units Oral Daily    cyanocobalamin  100 mcg Oral Daily    lisinopril  10 mg Oral BID    pantoprazole  20 mg Oral QAM AC    acetaminophen  1,000 mg Oral q6h    lidocaine-menthol  2 patch Transdermal Daily    docusate sodium  100 mg Oral BID     Continuous Infusing Medication:  PRN Medication:  hydrALAzine    tiZANidine    oxyCODONE **OR** oxyCODONE    polyethylene glycol (PEG 3350)    magnesium hydroxide    bisacodyl    fleet enema    HYDROmorphone **OR** HYDROmorphone **OR** HYDROmorphone       Microbiology:    No results found for this visit on 11/15/24.    No results found for: \"COVID19\"     Assessment/Plan:     89 yo woman who presented with acute on chronic back pain and found to have multiple compression fractures      Compression fractures- combination of acute/chronic at T12, L1-4 (L1 new)  - discussed with pt's granddaughters (help translate cambodian as well), one of whom is a pediatrician today again  - no current concerning neurologic signs/sx  - TLSO brace while OOB  - doing well with scheduled high dose tylenol, scheduled low dose toradol (now off)+ ppi, lidocaine patches. Tried low dose oxy  - dose of calcitonin given to help with pain in the setting of acute compression fracture as there has been some evidence of benefit with this.  - Will also ask spine surgery service to see for any additional input, but anticipate that conservative management would be preferred over kyphoplasty if patient is doing okay with this strategy.  - PT and OT mari, may need [facility], d/w SW, referrals sent. Normally lives c dtr   --bowel regimen     HTN, flucuating BPs  - lisinopril, coreg  - had a lower BP post oxy  - GD mentioned norvasc but don't see on med list, will clarify      Scds, lovenox     D/w RN    Reviewed chart including previous progress notes    Shankar Tubbs MD  [facility]  Hospitalist  [phone]  11/18/2024  2:55 PM

## 2024-11-18 NOTE — CONGREGATE LIVING REVIEW
[facility] Authorization    The [facility] Review Committee has reviewed this case and the patient IS APPROVED for discharge to a facility for Short Term Skilled once the following procedure is followed:     - The physician discharge instructions (contained within the JACOBY note for SNF) must inlcude the below appropriate and approved COVID instructions to the facility    For questions regarding CLRC approval process, please contact the CM assigned to the case.  For questions regarding RN discharge workflow, please contact the unit Clinical Leader.

## 2024-11-19 ENCOUNTER — APPOINTMENT (OUTPATIENT)
Dept: MRI IMAGING | Facility: HOSPITAL | Age: 89
End: 2024-11-19
Payer: MEDICARE

## 2024-11-19 PROCEDURE — 99231 SBSQ HOSP IP/OBS SF/LOW 25: CPT

## 2024-11-19 PROCEDURE — 72148 MRI LUMBAR SPINE W/O DYE: CPT

## 2024-11-19 NOTE — PLAN OF CARE
ER admit 11/15 low back pain, found comp fx, Pt is AAOX4, speaks Cambodian, VSS, TELE, voiding freely to purewick, TLSO brace when up OOB, up MOD assist, PO meds for pain see MAR, plan for home vs [facility], family at bedside, MRI results pending, Pt doing well, all needs met, all safety measures in place, call light within reach, will CTM.

## 2024-11-19 NOTE — PLAN OF CARE
Alert and Oriented x4. On RA. VSS. Tele-NS. Bruising on upper chest d/t cultural cambodian massage performed by family; Denies pain at this time. Denies N/T. Voiding freely. TLSO brace when OOB. Tolerating diet. Denies N/V. Call light within reach at this time.    Plan: MRI in AM, possible kypho pending MRI results, otherwise conservative management

## 2024-11-19 NOTE — PROGRESS NOTES
INPATIENT PROGRESS NOTE    Assessment:   Vargas Power in room 352/352-A, is a 89 year old female, Hospital Day ( LOS: 3 days ) hospitalized for Compression fracture of lumbar vertebra, unspecified lumbar vertebral level, initial encounter ([facility]).    Post-Op Day   s/p     The patient's other hospital problems include:   Active Hospital Problems    Back pain      *Compression fracture of lumbar vertebra, unspecified lumbar vertebral level, initial encounter ([facility])        Plan:     Patient is neurologically intact. No acute neurosurgical intervention indicated at this time   TLSO brace when up out of bed  MRI lumbar spine completed this morning, shows acute L1 compression fracture   Pain control per hospitalist  Medical management per hospitalist  PT/OT, encourage ambulation  Discussed potential intervention with kyphoplasty if pain is uncontrolled with conservative measures  Discussed risks and benefits of acute rehab facility vs. physical therapy at home, patient and family to discuss options     Patient was seen and examined at bedside with Nereyda Olsen PA-C. Patient agrees with the plan and all questions were answered. Discussed with Dr. Bailey.    Subjective:   History obtained with son who provided translation. Endorses pain with movement. States pain is tolerable when resting in a seated or laying position. Some difficulty with getting to the chair due to weakness. Wearing brace when OOB. Denies pain, numbness or tingling of the extremities.     Objective:   I&O: I/O last 3 completed shifts:  In: 520 [P.O.:520]  Out: 1525 [Urine:1525]     VITALS: BP (!) 172/54 (BP Location: Left arm)   Pulse 68   Temp 98 °F (36.7 °C) (Oral)   Resp 16   Ht 57\"   Wt 95 lb   SpO2 95%   BMI 20.56 kg/m²  Temp (24hrs), Av.3 °F (36.8 °C), Min:98 °F (36.7 °C), Max:98.6 °F (37 °C)       Clinical exam:  Neurological: Patient is awake and alert. Answers questions appropriately. Sensation to light touch of the lower extremity intact  bilaterally.     Lower extremity strength:        Iliospoas  Hamstrings  Quads  D-flexion  P-flexion     Right 5 5 5 5 5     Left 5 5 5 5 5     Imaging reviewed:   Independent review of MRI lumbar spine shows acute compression fracture at L1        NICHOLAS Rizzo

## 2024-11-19 NOTE — CM/SW NOTE
Met with pt's family at bedside.  Informed them that [facility] is not able to accept pt for admission.  They are still undecided about going to NH facility vs bringing pt home.  They would like to tour additional facilities to determine if they are comfortable with any of the accepting NH facilities.  If not they may choose to bring pt home.  They requested list of NH facilities again - printed and given as requested.    Discussed DC planning with pt's RN.  No discharge order in place and unclear from MD notes when pt will be medically cleared for discharge.  MD paged this AM for co-sign of SW consult order for DME, verbiage in MD note and MD signature on DME questionnaire.  Until all documentation is received, hospital bed cannot be ordered.  RN to update MD.  / to remain available for support and/or discharge planning.     Debra Jerome, [facility]  Discharge Planner  [phone]    Addendum:  referral sent to [facility] via [facility] for hospital bed.  Await confirmation of approval and delivery time.  Updated pt's family at bedside.  Discussed plan for DC tomorrow.  Pt's dtr to tour 2 NH facilities in AM and will make final decision about home vs NH.

## 2024-11-19 NOTE — PROGRESS NOTES
.[facility] Hospitalist note    PCP: Kyler Washington MD    Chief Complaint:  F/u compression fracture    SUBJECTIVE:  Pt lying in bed, spoke c son at bedside and GD on phone. Hoping to take pt home. Questions about calcitonin. Pain seems ok at rest, more c movement.     OBJECTIVE:  Temp:  [97.6 °F (36.4 °C)-98.4 °F (36.9 °C)] 97.6 °F (36.4 °C)  Pulse:  [67-76] 67  Resp:  [16-18] 16  BP: (150-172)/(49-74) 153/60  SpO2:  [95 %-97 %] 96 %    Intake/Output:    Intake/Output Summary (Last 24 hours) at 11/19/2024 1517  Last data filed at 11/19/2024 1020  Gross per 24 hour   Intake 120 ml   Output 400 ml   Net -280 ml       Last 3 Weights   11/16/24 2200 95 lb (43.1 kg)   11/15/24 2019 95 lb (43.1 kg)   05/26/24 1123 97 lb (44 kg)   05/19/21 1103 97 lb (44 kg)       Exam  Gen: No acute distress  HEENT: anicteric sclera, MMM  Pulm: Lungs clear, normal respiratory effort  CV: Heart with regular rate and rhythm, no peripheral edema  Abd: Abdomen soft, nontender, nondistended, no organomegaly, bowel sounds present  MSK: Full range of motion in extremities, no clubbing, no cyanosis  Skin: no rashes or lesions  Neuro: A&OX3, no focal deficits    Data Review:         Labs:     Recent Labs   Lab 11/15/24  2207   WBC 8.7   HGB 11.3*   MCV 74.9*   .0   NE 6.62   LYMABS 1.36       Recent Labs   Lab 11/15/24  2207 11/16/24  0418 11/17/24  0523 11/18/24  0430     --  138  --    K 3.2* 3.3* 3.4* 3.6     --  106  --    CO2 30.0  --  30.0  --    BUN 13  --  16  --    CREATSERUM 0.70  --  0.73  --    CA 9.3  --  9.0  --    MG  --  1.6  --   --    *  --  127*  --        Recent Labs   Lab 11/15/24  2207   ALT 17   AST 23   ALB 4.1       No results for input(s): \"TROP\", \"CK\", \"PBNP\", \"PCT\" in the last 168 hours.    No results for input(s): \"CRP\", \"DEVIN\", \"LDH\", \"DDIMER\" in the last 168 hours.    Recent Labs   Lab 11/16/24  1202 11/16/24  2107   PGLU 116* 132*       Meds:   Scheduled Medication:   enoxaparin  30 mg  Subcutaneous Daily    fluticasone-salmeterol  1 puff Inhalation BID    aspirin  81 mg Oral Daily    carvedilol  25 mg Oral BID with meals    cholecalciferol  2,000 Units Oral Daily    cyanocobalamin  100 mcg Oral Daily    lisinopril  10 mg Oral BID    pantoprazole  20 mg Oral QAM AC    acetaminophen  1,000 mg Oral q6h    lidocaine-menthol  2 patch Transdermal Daily    docusate sodium  100 mg Oral BID     Continuous Infusing Medication:  PRN Medication:  hydrALAzine    tiZANidine    oxyCODONE **OR** oxyCODONE    polyethylene glycol (PEG 3350)    magnesium hydroxide    bisacodyl    fleet enema    HYDROmorphone **OR** HYDROmorphone **OR** HYDROmorphone       Microbiology:    No results found for this visit on 11/15/24.    No results found for: \"COVID19\"     Assessment/Plan:     89 yo woman who presented with acute on chronic back pain and found to have multiple compression fractures      Compression fractures- combination of acute/chronic at T12, L1-4 (L1 new)  - discussed with pt's granddaughter who is a pediatrician   - no current concerning neurologic signs/sx  - TLSO brace while OOB  - doing well with scheduled high dose tylenol, scheduled low dose toradol (now off)+ ppi, lidocaine patches. Tried low dose oxy  - dose of calcitonin given to help with pain in the setting of acute compression fracture as there has been some evidence of benefit with this. Further d/w nsgy to see if additional doses needed   - apprec spine surgery input, conservative management, non-op  - MRI rviewed c GD, Compression fracture of L1 with up to 50 percent height loss centrally.  Diffuse marrow edema of the L1 vertebral body indicating an acute or subacute process.    -Chronic compression fractures of T12, L2, and L3.  Grade 1 anterolisthesis at multiple levels within the lower lumbar spine, likely on a chronic degenerative basis.    - kyphoplasty if warranted   - PT and OT evals, may need [facility], d/w SW, referrals sent. Normally lives c dtr    --bowel regimen     HTN, flucuating BPs  - lisinopril, coreg  - had a lower BP post oxy  - GD mentioned norvasc but don't see on med list, will clarify      Scds, lovenox     Dispo: ortho, dc planning, likely home c help as family working on assistance/family members    -Patient requires a semi-electric hospital bed at home due to pain from spinal fracture. Patient requires positioning of the body in ways not feasible in an ordinary bed.  The patient requires frequent and/or immediate changes in body positions.  Use of a hospital bed will enhance patient's safety and recovery in the home.     Order and form signed. Pt medically clear for dc. Family consider SNF but was told leaning towards home c supportive services     D/w RN and NSGY team. Total time 80 min [id number] given pain issues, med concerns, dc planning.     Shankar Tubbs MD  [facility] Hospitalist  [phone]  11/19/2024  3:24 PM

## 2024-11-19 NOTE — CM/SW NOTE
Patient received MRI last evening.  Await results and MD recommendations for possible kypho.  Pt's family considering DC to [facility] vs home with [facility] and DME.    Patient requires a semi-electric hospital bed at home due to pain from spinal fracture. Patient requires positioning of the body in ways not feasible in an ordinary bed.  The patient requires frequent and/or immediate changes in body positions.  Use of a hospital bed will enhance patient's safety and recovery in the home.    Will need MD cosigned order for DME as well as verbiage in MD note and MD signed questionnaire in order to obtain approval for hospital bed at home.  Referral to be sent to [facility] once required documentation available.  / to remain available for support and/or discharge planning.     Debra Jerome, [facility]  Discharge Planner  [phone]

## 2024-11-20 ENCOUNTER — APPOINTMENT (OUTPATIENT)
Dept: GENERAL RADIOLOGY | Facility: HOSPITAL | Age: 89
End: 2024-11-20
Attending: ANESTHESIOLOGY
Payer: MEDICARE

## 2024-11-20 VITALS
OXYGEN SATURATION: 96 % | BODY MASS INDEX: 20.49 KG/M2 | WEIGHT: 95 LBS | TEMPERATURE: 98 F | HEART RATE: 67 BPM | SYSTOLIC BLOOD PRESSURE: 133 MMHG | DIASTOLIC BLOOD PRESSURE: 53 MMHG | HEIGHT: 57 IN | RESPIRATION RATE: 16 BRPM

## 2024-11-20 LAB
ANION GAP SERPL CALC-SCNC: 3 MMOL/L (ref 0–18)
BUN BLD-MCNC: 30 MG/DL (ref 9–23)
CALCIUM BLD-MCNC: 9 MG/DL (ref 8.7–10.4)
CHLORIDE SERPL-SCNC: 103 MMOL/L (ref 98–112)
CO2 SERPL-SCNC: 30 MMOL/L (ref 21–32)
CREAT BLD-MCNC: 0.85 MG/DL
EGFRCR SERPLBLD CKD-EPI 2021: 63 ML/MIN/1.73M2 (ref 60–?)
ERYTHROCYTE [DISTWIDTH] IN BLOOD BY AUTOMATED COUNT: 13.9 %
GLUCOSE BLD-MCNC: 96 MG/DL (ref 70–99)
HCT VFR BLD AUTO: 31.8 %
HGB BLD-MCNC: 10.5 G/DL
MAGNESIUM SERPL-MCNC: 1.7 MG/DL (ref 1.6–2.6)
MCH RBC QN AUTO: 24.4 PG (ref 26–34)
MCHC RBC AUTO-ENTMCNC: 33 G/DL (ref 31–37)
MCV RBC AUTO: 74 FL
OSMOLALITY SERPL CALC.SUM OF ELEC: 288 MOSM/KG (ref 275–295)
PLATELET # BLD AUTO: 213 10(3)UL (ref 150–450)
POTASSIUM SERPL-SCNC: 3.8 MMOL/L (ref 3.5–5.1)
RBC # BLD AUTO: 4.3 X10(6)UL
SODIUM SERPL-SCNC: 136 MMOL/L (ref 136–145)
WBC # BLD AUTO: 4.4 X10(3) UL (ref 4–11)

## 2024-11-20 PROCEDURE — 99231 SBSQ HOSP IP/OBS SF/LOW 25: CPT | Performed by: NEUROLOGICAL SURGERY

## 2024-11-20 PROCEDURE — 99223 1ST HOSP IP/OBS HIGH 75: CPT | Performed by: ANESTHESIOLOGY

## 2024-11-20 PROCEDURE — 72220 X-RAY EXAM SACRUM TAILBONE: CPT | Performed by: ANESTHESIOLOGY

## 2024-11-20 RX ORDER — MAGNESIUM OXIDE 400 MG/1
400 TABLET ORAL ONCE
Status: COMPLETED | OUTPATIENT
Start: 2024-11-20 | End: 2024-11-20

## 2024-11-20 RX ORDER — CALCITONIN SALMON 200 [IU]/.09ML
1 SPRAY, METERED NASAL DAILY
Qty: 14 EACH | Refills: 0 | Status: SHIPPED | OUTPATIENT
Start: 2024-11-20 | End: 2024-12-04

## 2024-11-20 RX ORDER — CALCITONIN SALMON 200 [IU]/.09ML
1 SPRAY, METERED NASAL DAILY
Status: DISCONTINUED | OUTPATIENT
Start: 2024-11-20 | End: 2024-11-20

## 2024-11-20 NOTE — DISCHARGE SUMMARY
[facility] Internal Medicine Discharge Summary   Patient ID:  Vargas Power  [id number]  89 year old  6/15/1929    Admit date: 11/15/2024    Discharge date and time: 11/20/2024     Attending Physician: Latonya Cyr MD     Primary Care Physician: Kyler Washington MD     Admit Dx: Compression fracture of lumbar vertebra, unspecified lumbar vertebral level, initial encounter (Formerly [facility]) [S32.000A]    Hospital Discharge Diagnoses:  compression fx    Disposition: home c dtr and DME    Important follow up:  -PCP in [x] within 7 days [] within 14 days [] other    Kyler Washington MD  [address] [facility] [address] [phone]    Schedule an appointment as soon as possible for a visit      Yary Arndt MD  [address] [facility] [address] [phone]    Call        Hospital Course:   89 yo woman who presented with acute on chronic back pain and found to have multiple compression fractures      Compression fractures- combination of acute/chronic at T12, L1-4 (L1 new)  - discussed with pt's granddaughter who is a pediatrician   - no current concerning neurologic signs/sx  - TLSO brace while OOB  - doing well with scheduled high dose tylenol, scheduled low dose toradol (now off)+ ppi, lidocaine patches. Tried low dose oxy  - dose of calcitonin given to help with pain in the setting of acute compression fracture as there has been some evidence of benefit with this. Further d/w nsgy to see if additional doses needed   - apprec spine surgery input, conservative management, non-op  - MRI rviewed c GD, Compression fracture of L1 with up to 50 percent height loss centrally.  Diffuse marrow edema of the L1 vertebral body indicating an acute or subacute process.    -Chronic compression fractures of T12, L2, and L3.  Grade 1 anterolisthesis at multiple levels within the lower lumbar spine, likely on a chronic degenerative basis.    - kyphoplasty if warranted   - PT and OT evals, may need LORNE, d/w SW, referrals sent.  Normally lives c dtr. Family prefers home, DME set up c hospital bed and wheelchair   --bowel regimen  - pain service recs apprec; sacrum XR to rule out any sacral insufficiency fracture.   - hold off kypho for now  - close o/p f/u  - tylenol, lidocain patch prn pain; dc c 2 week course calcitonin, requested by gd        HTN, flucuating BPs  - lisinopril, coreg  - had a lower BP post oxy  - GD mentioned norvasc but don't see on med list, will clarify      Scds, lovenox     Dispo: orthohome c help as family working on assistance/family members     -Patient requires a semi-electric hospital bed at home due to pain from spinal fracture. Patient requires positioning of the body in ways not feasible in an ordinary bed.  The patient requires frequent and/or immediate changes in body positions.  Use of a hospital bed will enhance patient's safety and recovery in the home.        Patient requires a wheelchair to complete mobility related ADL's (MRADL) within the home. Due to acute and chronic spinal compression fractures, patient has a mobility limitation that significantly impairs her ability to complete the majority of  MRADL's.  This limitation cannot be sufficiently resolved with a properly fitted cane or walker.  Patient has a caregiver who is available, willing and able to provide assistance with the lightweight wheelchair that is provided during a typical day. Patient has adequate space within her home to safely use the wheelchair and has not indicated an unwillingness to use the wheelchair provided in the home.     Day of discharge Exam   Vitals:    11/20/24 1220   BP: 133/53   Pulse: 67   Resp: 16   Temp: 97.9 °F (36.6 °C)       Exam on day of discharge:  Lying in bed, feeling ok. Pain spiked earlier.     Gen: No acute distress, alert and oriented  CV: RRR, +s1/s2  Lungs: CTAB, good respiratory effort  Abdomen: s/nt/nd  Ext: Moves all 4 extremities, no c/c/e  Neuro: CN Intact, no focal deficits      Discharge  meds     Medication List        START taking these medications      lidocaine-menthol 4-1 % Ptch  Place 2 patches onto the skin daily.            CONTINUE taking these medications      Advair Diskus 500-50 MCG/DOSE Aepb  Generic drug: fluticasone-salmeterol  INHALE 1 PUFF INTO THE LUNGS TWICE DAILY     aspirin 81 MG Chew  CHEW AND SWALLOW 1 TABLET(81 MG) BY MOUTH DAILY     B-12 100 MCG Tabs     carvedilol 25 MG Tabs  Commonly known as: Coreg  Take 1 tablet (25 mg total) by mouth 2 (two) times daily with meals.     Cholecalciferol 50 MCG (2000 UT) Caps     hydrOXYzine 25 MG Tabs  Commonly known as: Atarax  Take 1 tab PO 30 minutes before going to bed     lisinopril 10 MG Tabs  Commonly known as: Zestril  Take 1 tablet (10 mg total) by mouth 2 (two) times daily.     omeprazole 20 MG Cpdr  Commonly known as: PriLOSEC  TAKE 1 CAPSULE(20 MG) BY MOUTH EVERY MORNING BEFORE BREAKFAST     OneTouch Delica Plus [id number] Misc  1 each by Other route daily.     OneTouch Ultra Strp  Generic drug: Glucose Blood  TEST BLOOD SUGAR ONCE DAILY     tiZANidine HCl 2 MG Caps  Commonly known as: ZANAFLEX  Take 1 capsule (2 mg total) by mouth 3 (three) times daily as needed for Muscle spasms.     triamcinolone 0.1 % Oint  Commonly known as: Kenalog  APPLY TO THE AFFECTED AREA ON THE BODY TWICE DAILY               Where to Get Your Medications        These medications were sent to [facility][id number] - [address] AT [facility] [address], [phone], [phone]  [facility] [address] [place] [address]      Phone: [phone]   lidocaine-menthol 4-1 % Ptch         Consults: IP CONSULT TO HOSPITALIST  IP CONSULT TO ORTHO SPINE  IP CONSULT TO SOCIAL WORK  IP CONSULT TO SOCIAL WORK  IP CONSULT TO SOCIAL WORK    Radiology: MRI SPINE LUMBAR (CPT=72148)    Result Date: 11/19/2024  PROCEDURE:  MRI SPINE LUMBAR (CPT=72148)  COMPARISON:  MR ELIEZER, SPINE LUMBAR W O CONTRAST MRI, 9/20/2008, 7:57 AM.  INDICATIONS:  Back  pain, assess compression fractures  TECHNIQUE:  Multiplanar T1 and T2 weighted images including fat suppression sequences.  Images acquired in sagittal and axial planes.   PATIENT STATED HISTORY: (As transcribed by Technologist)  Patient started having a significant lower mid back pain. Pt did not falls or injuries herself.  No numbness or weakness.    FINDINGS:  LUMBAR DISC LEVELS L1-L2:  Disc desiccation with mild diffuse disc bulge and thickening of ligamentum flavum resulting in mild central canal stenosis.  Mild facet arthrosis contributes to mild right-sided and moderate left-sided foraminal stenosis. L2-L3:  Disc desiccation with diffuse disc bulge and thickening of ligamentum flavum resulting in moderate central canal stenosis.  Moderate facet arthrosis contributes to moderate bilateral foraminal stenosis. L3-L4:  Disc desiccation with diffuse disc bulge and thickening of ligamentum flavum resulting in severe central canal stenosis.  Advanced facet arthrosis contributes to severe right-sided and moderate left-sided foraminal stenosis. L4-L5:  Disc desiccation with diffuse disc bulge and thickening of ligamentum flavum resulting in severe central canal stenosis.  Advanced facet arthrosis contributes to severe right-sided and moderate left-sided foraminal stenosis. L5-S1:  Disc desiccation with diffuse disc bulge and thickening of ligamentum flavum resulting in severe central canal stenosis.  Moderate facet arthrosis contributes to moderate/severe bilateral foraminal stenosis.  PARASPINAL AREA:  No epidural or paraspinal mass or fluid collection. BONY STRUCTURES:  There are 5 lumbar-type vertebral bodies.  Chronic vertebral plana of T12.  Compression fracture of L1 with up to 50 percent height loss centrally.  Mild diffuse marrow edema within the L1 vertebral body indicating an acute or subacute injury.  Additionally noted chronic compression fractures of the L2 and L3 vertebral bodies.  Grade 1  anterolisthesis of L3 on L4, L4 on L5, and L5 on S1, likely on a chronic degenerative basis. CORD/CAUDA EQUINA:  Normal caliber, contour, and signal intensity.             CONCLUSION:   1. Compression fracture of L1 with up to 50 percent height loss centrally.  Diffuse marrow edema of the L1 vertebral body indicating an acute or subacute process.   2. Chronic compression fractures of T12, L2, and L3.  Grade 1 anterolisthesis at multiple levels within the lower lumbar spine, likely on a chronic degenerative basis.   3. Advanced multilevel degenerative change resulting in severe central canal and neural foraminal stenosis at the L3-4, L4-5, L5-S1 levels, as detailed above.  The extent of degenerative change has progressed compared to 2008.   LOCATION:  [facility]   Dictated by (CST): Adriana Kellogg MD on 11/19/2024 at 10:30 AM     Finalized by (CST): Adriana Kellogg MD on 11/19/2024 at 10:44 AM       CT SPINE LUMBAR (CPT=72131)    Result Date: 11/15/2024  PROCEDURE:  CT SPINE LUMBAR (CPT=72131)  COMPARISON:  None.  INDICATIONS:  patient coming in from DR office, they did xray and patient has cmpression fx  t12, L1L3  TECHNIQUE:  Noncontrast CT scanning is performed through the lumbar spine.  Multiplanar reconstructions are generated.  Dose reduction techniques were used. Dose information is transmitted to the ACR (American College of Radiology) NRDR (National Radiology Data Registry) which includes the Dose Index Registry.  PATIENT STATED HISTORY: (As transcribed by Technologist)  Patient with back pain. Xray from doctor's office showed compression fracture.    FINDINGS:   There is levoconvex scoliosis of the lower lumbar spine.  The osseous structures are demineralized.  There is a severe compression fracture deformity of T12.  There is approximately 3 mm retropulsion of the superior endplate into the spinal canal.  There is approximately 80% loss of vertebral body height.  There is moderate compression fracture deformity of L1  with approximately 50% loss of vertebral body height.  Acute fracture lines are identified in the vertebral body.  There is approximately 3 mm retropulsion of the inferior endplate.  There is a large Schmorl's node identified in the inferior endplate of L2 with findings suggesting an inferior endplate compression deformity with no acute fracture line seen.  There is posterior endplate spurring of the inferior endplate and a disc osteophyte complex.  There is compression fracture deformity of L3 with approximately 55% loss of vertebral body height.  There is a disc osteophyte complex.  No retropulsion is seen.  There is a large Schmorl's node in the inferior endplate of L3 also.  No acute fracture lines are seen.  There is a mild superior compression fracture deformity of L4.  There is 3 mm anterior subluxation of L3 on 4. There is a disc osteophyte complex at L3-4 also.  No acute fracture seen at L5.  There is multilevel facet arthropathy seen throughout the lumbar spine.  There is no spondylolysis.  Atherosclerotic calcified plaque is seen throughout the visualized abdominal aorta and iliac arteries.  There is a large gallstone within the gallbladder measuring 19 mm in diameter.  Sclerotic changes are seen in the left iliac bone of indeterminate significance.    LUMBAR DISC LEVELS: L1-L2:  Retropulsion at L1-2 and facet arthropathy and diffuse bulging disc narrows the spinal canal to approximately 9 mm consistent with mild spinal canal stenosis.  There is mild neural foraminal narrowing bilaterally.   L2-L3:  There is moderate right and mild to moderate left foraminal narrowing.  There is borderline central spinal canal stenosis.  AP diameter is 10 mm.  L3-L4:  There is bony central spinal canal stenosis.  The AP diameter measures 8 mm.  There is moderate to marked bilateral foraminal stenosis.    L4-L5:  There is central spinal canal stenosis.  AP diameter of the thecal sac measures approximately 8 mm .  There is  moderate to marked right and mild left foraminal stenosis. L5-S1:  There is central spinal canal stenosis.  The AP diameter of the thecal sac measures approximately 6 mm.  There is moderate to marked canal stenosis seen bilaterally.  At T10-11 there is no central spinal canal stenosis or significant foraminal stenosis.  At T11-12 the retropulsion of the superior endplate creates canal stenosis measuring approximately 9 mm.  The neural foramina appear patent.             CONCLUSION:  There are compression fracture deformities of T12, L1, L2, L3 and L4.  Please see above for details.  The L1 compression fracture deformity is definitively new as there are acute fracture lines appreciated.  The other fractures are of indeterminate age. 2. Retropulsion of the superior endplate of T12 by approximately 3 mm. 3. Multilevel spinal canal stenosis and foraminal stenoses are noted as detailed above 4. Incidental large gallstone seen in the gallbladder lumen measuring 19 mm in diameter.  No obvious gallbladder wall thickening. 5. Osseous structures are demineralized. 6. Extensive atheromatous calcified plaque seen within the aorta, aortic branches and iliac arteries.    LOCATION:  [facility]   Dictated by (CST): Lanre Pitt MD on 11/15/2024 at 11:16 PM     Finalized by (CST): Lanre Pitt MD on 11/15/2024 at 11:33 PM          Operative Procedures:      Code Status: Full Code    Total Time Coordinating Care: Greater than 30 minutes    Patient had opportunity to ask questions and state understand and agree with therapeutic plan as outlined. I reviewed and reconciled current and discharge medications on day of discharge and discussed meds with patient. D/w pt, gd, son/dtr, RN/SW     MD ROHAN Malin Hospitalist  [phone]  11/20/2024  2:43 PM

## 2024-11-20 NOTE — PROGRESS NOTES
[facility]  Neurosurgery Progress Note    Vargas Power Patient Status:  Inpatient    6/15/1929 MRN [id number]   Location [facility] 3SW-A Attending Latonya Cyr MD   Hosp Day # 4 PCP Kyler Washington MD     PRINCIPLE PROBLEM:   L1 compression fracture    SUBJECTIVE     Patient awake and alert, smiling in bed. Daughter at bedside assists with translation. Patient reports ongoing LBP, but has no new complaints otherwise.     OBJECTIVE/PHYSICAL EXAM     VITALS:  /47 (BP Location: Left arm)   Pulse 72   Temp 98 °F (36.7 °C) (Oral)   Resp 16   Ht 57\"   Wt 95 lb (43.1 kg)   SpO2 96%   BMI 20.56 kg/m²     GENERAL: No acute distress, non-toxic appearing, mood appropriate    HEENT: Normocephalic, atraumatic    RESP:  Respirations non-labored, even    CV:  NSR on tele    NEURO: Awake and alert. Able to follow commands and answer questions appropriately with assistance of daughter translating.  Sensation to light touch is intact bilaterally.  x 4. Full strength BUE/BLE. Gait deferred.     LABS     Lab Results   Component Value Date    WBC 4.4 2024    HGB 10.5 2024    HCT 31.8 2024    .0 2024    CREATSERUM 0.85 2024    BUN 30 2024     2024    K 3.8 2024     2024    CO2 30.0 2024    GLU 96 2024    CA 9.0 2024    MG 1.7 2024     IMAGING     No new imaging to review.    ASSESSMENT & PLAN     ASSESSMENT:  L1 compression fracture     PLAN:  No acute neurosurgical intervention is indicated at this time  TLSO brace when out of bed  Medical management and pain control per hospitalist  PT/OT, encourage OOB  Okay to DC from a Neurosurgical standpoint. F/u in clinic as scheduled in 1 month with repeat XR lumbar spine. Daughter voiced understanding of the above.     Rossana Ellis, APRN-NP  [facility]  2024, 9:56 AM      A total of 10 minutes of visit time (exclusible of billable  procedures) was administered.  >50 % of time spent counseling/coordinating care.    Is this a shared or split note between Advanced Practice Provider and Physician? Yes    Patient seen examined with PA  Case discussed  She is having some more pain today  Family describes as about an 8  They are interested in a kyphoplasty now  Spoke with Dr. Bailey  I made a call with Dr. An  He will come see her later today  Possible kyphoplasty tomorrow  Following

## 2024-11-20 NOTE — CONSULTS
Name: Vargas Power   : 6/15/1929   DOS: 2024     Chief complaint: Low back      History of present illness:  Vargas Power is a 89 year old female with a history of hypertension, asthma admitted due to intractable low back pain.  The patient is accompanied by her granddaughter and son.  From a symptom standpoint, patient does not complain of any back pain today, but complains of pain in the left buttock and hip region.  CT lumbar spine performed in the ED shows history of multiple compression fractures at T12, L1, L2, L3, and L4.  Most recent MRI shows L1 acute compression fracture.  Therefore pain service is consulted for possible kyphoplasty.      She denies any chills, fever or weakness. She denies any bladder or bowel incontinence.      Past Medical History:    Arrhythmia    palpitations    Asthma    Benign neoplasm of kidney, except pelvis    no change  to     Chest pain, unspecified     stress echo neg    Cholelithiasis    asymptomatic    Esophageal reflux    : EGD    Helicobacter pylori (H. pylori)    treated    HTN    lytes wnl     Irritable bowel syndrome    longstanding chronic abdominal pain    Osteoporosis    fosamax [id number] (SE)    Other and unspecified ovarian cyst    complex cyst (Dr. Cid)    Urge incontinence    Vitamin D deficiency      No current outpatient medications on file.     Past Surgical History:   Procedure Laterality Date    Colonoscopy,diagnostic      wnl    Injection, w/wo contrast, dx/therapeutic substance, epidural/subarachnoid; lumbar/sacral N/A 2016    Procedure: CAUDAL;  Surgeon: Daniel Mclean MD;  Location: [facility]    Patient documented not to have experienced any of the following events N/A 2016    Procedure: CAUDAL;  Surgeon: Daniel Mclean MD;  Location: [facility]    Patient withough preoperative order for iv antibiotic surgical site infection prophylaxis. 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, lives with dtr   Tobacco Use    Smoking status: Never    Smokeless tobacco: Never   Vaping Use    Vaping status: Never Used   Substance and Sexual Activity    Alcohol use: No    Drug use: Never   Social History Narrative    Health Mnt:    Pap: 9/07, 2/10 Defer due to age    Mamm: 10/08, 1/10, ref 1/11    Breast exam: 9/08,5/09, 2/10, 1/11    DEXA:1/01, 2/05 , 2/10, 8/10 (ordered elsewhere)    Cholesterol: 10/05 (96), 2/10 Defer due to age    Colon: 12/01 (10 yrs) defer due to age     H/O:         calcium: no supplement/ Vit D as above    exercise:  walks,     Social Drivers of Health     Financial Resource Strain: Low Risk  (3/30/2023)    Received from [facility]    Financial Resource Strain     In the past year, have you or any family members you live with been unable to get any of the following when it was really needed? Check all that apply.: None   Food Insecurity: No Food Insecurity (11/15/2024)    Food Insecurity     Food Insecurity: Never true   Transportation Needs: No Transportation Needs (11/15/2024)    Transportation Needs     Lack of Transportation: No   Social Connections: Low Risk  (3/30/2023)    Received from [facility]    Social Connections     How often do you see or talk to people that you care about and feel close to? (For example: talking to friends on the phone, visiting friends or family, going to Muslim or club meetings): 5 or more times a week   Housing Stability: Low Risk  (11/15/2024)    Housing Stability     Housing Instability: No       Review of  other systems:  10  point ROS otherwise negative    Physical examination: Vargas is a 89 year old female not in acute distress  /53 (BP Location: Right arm)   Pulse 67   Temp 97.9 °F (36.6 °C) (Oral)   Resp 16   Ht 57\"   Wt 95 lb (43.1 kg)   SpO2 96%   BMI 20.56 kg/m²    The patient is awake, alert, oriented and corporative. She has a normal affect. The patient is sitting comfortably in brace  HEENT: No gross lesion noted. PEERL. No icterus.  Neck and Upper Extremity: Supple. No thyromegaly or lymphadenopathy.  Palpation and range of motion intact.  Peripheral IV in place  Cardiovascular system: Regular rate and rhythm on telemetry   respiratory system: Speech is nonlabored.  Saturating well on room air  Abdomen: Soft, nontender,   Neurologic:  Cranial nerves II through XII are grossly intact.       Examination of the lower back:   No pain with palpation of spinous processes along the midline.  She does have a TLSO brace on.  Does have tenderness palpation on the left sacrum.     Radiology diagnostic studies:   Lumbar MRI reviewed with evidence of multiple chronic compression fractures.  There is new L1 compression fracture with approximately 50% loss of height without retropulsion    Assessment:  Back pain  Acute L1 compression fracture      Plan:     The patient is a 89-year-old female with hypertension accompanied by her granddaughter and son.  The patient is Cambodian speaking and this interview was done with a qualified .  She was admitted due to back pain.  Although today, does not complain of much axial back pain but pain in the left hip and sacral region.  I palpated her spine and while she does have an L1 compression fracture she did not have much pain with palpation of the spinous process.  She obviously has a history of osteoporosis and multiple previous chronic compression fractures.  Given lack of pain with physical recommend imaging of the sacrum to rule out any sacral insufficiency fracture.   Rationale for this discussed with patient's family.  Patient's family decides to defer kyphoplasty as well given lack of pain in the L1 generalized area.        Arnulfo An MD MPH  Pain Management

## 2024-11-20 NOTE — OCCUPATIONAL THERAPY NOTE
OCCUPATIONAL THERAPY TREATMENT NOTE - INPATIENT     Room Number: 352/352-A  Session: 1&2  Number of Visits to Meet Established Goals: 5    Presenting Problem: T12, L1-L4 fxs    ASSESSMENT   Patient demonstrates good  progress this session, goals remain in progress.    Patient continues to function below baseline with toileting, lower body dressing, bed mobility, transfers, and dynamic standing balance.   Contributing factors to remaining limitations include decreased functional strength, decreased functional reach, decreased endurance, pain, impaired standing balance, decreased muscular endurance, and difficulty maintaining precautions.  Next session anticipate patient to progress toileting, lower body dressing, brace management, bed mobility, and dynamic standing balance.  Occupational Therapy will continue to follow patient for duration of hospitalization.    Patient continues to benefit from continued skilled OT services: to promote return to prior level of function and safety with continuous assistance and gradual rehabilitative therapy. Family states currently planning to bring patient home at discharge, in which case patient would require HHOT and initial 24-hr supervision for safety and increased assist with ADLs; granddaughter at bedside reports this has been arranged, also have bedside commode as well as hospital bed arriving today.    History: Patient is a 89 year old female admitted on 11/15/2024 with Presenting Problem: T12, L1-L4 fxs. Co-Morbidities : osteoporosis, HTN    WEIGHT BEARING RESTRICTION       Recommendations for nursing staff:   Transfers: 1-person  Toileting location:  commode    TREATMENT SESSION:  Patient Start of Session: chair    FUNCTIONAL TRANSFER ASSESSMENT  Sit to Stand: Chair  Chair: Contact Guard Assist  Commode Transfer: Not Tested    BED MOBILITY  Rolling: Not Tested  Supine to Sit : Not tested  Sit to Supine (OT): Not Tested    BALANCE ASSESSMENT     FUNCTIONAL ADL  ASSESSMENT  Grooming Seated: Not Tested  LB Dressing Seated: Not Tested    ACTIVITY TOLERANCE: WFL                         O2 SATURATIONS       EDUCATION PROVIDED  Patient Education : Role of Occupational Therapy; Functional Transfer Techniques; Fall Prevention; Posture/Positioning; Proper Body Mechanics  Patient's Response to Education: Verbalized Understanding; Requires Further Education  Family/Caregiver Education : Role of Occupational Therapy; Functional Transfer Techniques; Fall Prevention; Posture/Positioning; Proper Body Mechanics; Bracing Education Provided  Family/Caregiver's Response to Education: Verbalized Understanding (patient off floor; performed caregiver education with daughter, son, and two granddaughters per family request)    Equipment used: RW  Demonstrates functional use, Would benefit from additional trial      Therapist comments:   AM session: Patient seen for OT session this am, son and granddaughter at bedside; reinforced on spine precautions and incorporation into ADLs and transfers, also educated on brace management, anticipate patient will require ongoing reinforcement; patient grossly CGA to stand from bedside chair, CGA to intermittent min assist for functional mobility to byrd and back into room to simulate distance to bathroom with RW and increased time; patient declining need for toileting at this time, but noted with CGA for static standing balance with B hand support on RW; granddaughter at bedside reports she anticipate family will be able to assist patient with ADLs at discharge (declining gradual rehab) but requesting therapist return this pm after 3pm to educate the patient's daughter and granddaughter that she lives with.   PM session: Returned in pm per family request for caregiver education session as family declining gradual therapy; patient off floor for xray, however patient's son, daughter and two granddaughters (including daughter and granddaughter that will be  patient's primary caregivers) present in room and with multiple questions regarding how best to care for patient. Reviewed spine precautions and incorporation into all ADLs and functional transfers; educated on logroll technique (hospital bed being delivered today) and brace management; discussed recommendation of initial use of bedside commode, working to progress distance as able with home therapies. Handouts provided of spine precautions and logroll technique. Family with multiple questions, all addressed at this time. Will continue to follow.    Patient End of Session: Other (Comment) (patient off floor at xray, caregiver education)    SUBJECTIVE  \"It hurts now\" [via granddaughter translating]    PAIN ASSESSMENT  Rating: Unable to rate  Location: L lower back with movement  Management Techniques: Activity promotion;Body mechanics;Repositioning     OBJECTIVE  Precautions: TLSO;Spine;Bed/chair alarm; needed (brace for OOB)    AM-PAC ‘6-Clicks’ Inpatient Daily Activity Short Form  -   Putting on and taking off regular lower body clothing?: A Lot  -   Bathing (including washing, rinsing, drying)?: A Lot  -   Toileting, which includes using toilet, bedpan or urinal? : A Lot  -   Putting on and taking off regular upper body clothing?: A Little  -   Taking care of personal grooming such as brushing teeth?: A Little  -   Eating meals?: None    AM-PAC Score:  Score: 16  Approx Degree of Impairment: 53.32%  Standardized Score (AM-PAC Scale): 35.96    PLAN  OT Device Recommendations: 3-in-1 commode  OT Treatment Plan: Balance activities;ADL training;Functional transfer training;UE strengthening/ROM;Endurance training;Patient/Family education;Patient/Family training;Equipment eval/education;Compensatory technique education  Rehab Potential : Good  Frequency: 3-5x/week    Goals in progress 11/20  ADL Goals   Patient will perform lower body dressing:  with min assist  Patient will perform toileting: with stand by  assist  Patient will don brace with family assist    Functional Transfer Goals  Patient will transfer from sit to supine:  with supervision  Patient will transfer from supine to sit:  with supervision  Patient will transfer to toilet:  with supervision    Additional Goals  Pt will maintain spine precautions during ADL routine without cueing    OT Session Time: 50 minutes (15 minutes am session, 35 minutes pm caregiver training session)  Therapeutic Activity: 15 minutes (am), 20 minutes education (pm)  Self-care Management: 15 minutes education (pm)

## 2024-11-20 NOTE — PLAN OF CARE
ER admit 11/15 low back pain, found comp fx, Pt is AAOX4, speaks Cambodian, VSS, TELE, voiding freely to purewick, TLSO brace when up OOB, up MOD assist, PO meds for pain see MAR, plan for home with DME today, family at bedside, Pt doing well, all needs met, all safety measures in place, call light within reach, will CTM.

## 2024-11-20 NOTE — PLAN OF CARE
A&O x4, forgetful at times. VSS on RA. . Pain well controlled when resting in bed, oxy PRN for PO pain control. Up with mod assist x1 with TLSO, gb, and walker. Voids freely via purewick. Bilateral SCDs. SL. Plan for home vs OLRNE at [facility]. Reviewed POC, pain management, IS use, and fall precautions with pt and sister at bedside. Bed alarm on w/bed in lowest position. Pt reminded to use call light.

## 2024-11-20 NOTE — PHYSICAL THERAPY NOTE
PHYSICAL THERAPY TREATMENT NOTE - INPATIENT    Room Number: 352/352-A     Session: 2    Number of Visits to Meet Established Goals: 5  Presenting Problem: Compresion fractures T12-L1,,L3  Co-Morbidities : osteoporosis, HTN    History related to current admission: Patient is a 89 year old female admitted on 11/15/2024 from home for LB and B LE pain.  Pt diagnosed with Compression fractures T12-L1,,L3 and is now prescribed to wear a TLSO     HOME SITUATION  Type of Home: House  Home Layout: Two level;Able to live on main level  Stairs to Enter : 2     Stairs to Bedroom: 0    Lives With: Family    Drives: No     Prior Level of [place]: Mod I in her mobilities with use of RW as support , living with daughter who works and can be left intermittently at home. Speaks a Cambodian language , understand a little english but requires  with daughter/g. Daughter translating    PHYSICAL THERAPY ASSESSMENT   Patient demonstrates fair progress this session, goals  remain in progress.      Patient is requiring minimal assist as a result of the following impairments: decreased functional strength, decreased endurance/aerobic capacity, pain, impaired static and dynamic standing balance, impaired coordination, impaired motor planning, and decreased muscular endurance.     Patient continues to function near baseline with bed mobility, transfers, gait, maintaining seated position, and standing prolonged periods.  Next session anticipate patient to progress bed mobility, transfers, and gait.  Physical Therapy will continue to follow patient for duration of hospitalization.    Patient continues to benefit from continued skilled PT services: to promote return to prior level of function and safety with continuous assistance and gradual rehabilitative therapy .    PLAN DURING HOSPITALIZATION  Nursing Mobility Recommendation : 1 Assist  PT Device Recommendation: Rolling walker  PT Treatment Plan: Bed mobility;Body  mechanics;Patient education;Gait training;Strengthening;Stair training;Transfer training;Balance training;Range of motion;Family education  Frequency (Obs): 3-5x/week     CURRENT GOALS     Goal #1 Patient is able to demonstrate supine - sit EOB @ level: supervision      Goal #2 Patient is able to demonstrate transfers Sit to/from Stand at assistance level: supervision      Goal #3 Patient is able to ambulate 50 feet with assist device: walker - rolling at assistance level: supervision      Goal #4     Goal #5     Goal #6     Goal Comments: Goals established on 11/17/2024 11/20/2024 all goals ongoing    SUBJECTIVE  Pt reports pain in L hip after ambulation     OBJECTIVE  Precautions: TLSO;Spine;Bed/chair alarm; needed (brace for OOB)    WEIGHT BEARING RESTRICTION     PAIN ASSESSMENT   Rating: Unable to rate  Location: low back  Management Techniques: Activity promotion;Body mechanics;Breathing techniques;Relaxation;Repositioning    BALANCE                                                                                                                       Static Sitting: Fair -  Dynamic Sitting: Fair -           Static Standing: Poor +  Dynamic Standing: Poor +    ACTIVITY TOLERANCE                         O2 WALK       AM-PAC '6-Clicks' INPATIENT SHORT FORM - BASIC MOBILITY  How much difficulty does the patient currently have...  Patient Difficulty: Turning over in bed (including adjusting bedclothes, sheets and blankets)?: A Little   Patient Difficulty: Sitting down on and standing up from a chair with arms (e.g., wheelchair, bedside commode, etc.): A Little   Patient Difficulty: Moving from lying on back to sitting on the side of the bed?: A Little   How much help from another person does the patient currently need...   Help from Another: Moving to and from a bed to a chair (including a wheelchair)?: A Little   Help from Another: Need to walk in hospital room?: A Little   Help from Another: Climbing  3-5 steps with a railing?: A Lot     AM-PAC Score:  Raw Score: 17   Approx Degree of Impairment: 50.57%   Standardized Score (AM-PAC Scale): 42.13   CMS Modifier (G-Code): CK    FUNCTIONAL ABILITY STATUS  Gait Assessment   Functional Mobility/Gait Assessment  Gait Assistance: Minimum assistance  Distance (ft): 30  Assistive Device: Rolling walker  Pattern: Shuffle    Skilled Therapy Provided  Educated on spine precautions: no bending, lifting, twisting  Educated on use of TLSO brace when OOB   Daughter present throughout session to assist with interpretation as pt is Cambodian speaking    Pt presents in BS chair c TLSO donned, therapist adjusted appropriately.   Pt was gait trained c RW as noted. Educated daughter on activity recs and safe mobility. GB issued to daughter.   Daughter states pt will stay on main level of home and they are in process of obtaining hosp bed     Bed Mobility:  Rolling:    Supine<>Sit:   Sit<>Supine: NT     Transfer Mobility:  Sit<>Stand: Triny to RW- vc for hand placement   Stand<>Sit: Triny   Gait: min A - cues for proper integration of RW and assist to navigate c RW     Therapist's Comments:   All family's questions and concerns addressed       THERAPEUTIC EXERCISES  Lower Extremity Ankle pumps  Heel slides  SLR     Upper Extremity Elbow flex/ext,  - open/close, Shoulder flex/ext, and shoulder punches     Position Sitting     Repetitions      Sets        Patient End of Session: Up in chair;Needs met;Call light within reach;RN aware of session/findings;Bracing education provided per handout;All patient questions and concerns addressed;Hospital anti-slip socks;Family present;Alarm set (TLSO donned)    PT Session Time: 15 minutes  Gait Training: 10 minutes  Therapeutic Activity: 5 minutes  Therapeutic Exercise:  minutes

## 2024-11-20 NOTE — DISCHARGE INSTRUCTIONS
Sometimes managing your health at home requires assistance.  The [facility] team has recognized your preference to use [facility].  They can be reached by phone at [phone].  The fax number for your reference is [phone].  A representative from the home health agency will contact you or your family to schedule your first visit.      It was recommended that you use a hospital bed at home to facilitate your recovery and compliment your treatment.  The [facility] team has set up delivery with [facility].  Contact information: Meenu Mancuso [address] [address].  Phone: [phone]..  The estimated delivery is 11/20/24.    If you experienced any difficulties with the delivery, please contact the [place] [facility] department at [phone].

## 2024-11-20 NOTE — CM/SW NOTE
Met with pt's family at bedside.  They confirmed hospital bed has been delivered to pt's home.  They are agreeable with plan for DC to home today and requested transportation.  Pt's candelario who will be her caregiver is coming to the hospital at 3pm to receive instruction on how to safely transfer patient.  They requested 4pm DC.  They are also now requesting wheelchair for home use.      Patient requires a wheelchair to complete mobility related ADL's (MRADL) within the home. Due to acute and chronic spinal compression fractures, patient has a mobility limitation that significantly impairs her ability to complete the majority of  MRADL's.  This limitation cannot be sufficiently resolved with a properly fitted cane or walker.  Patient has a caregiver who is available, willing and able to provide assistance with the lightweight wheelchair that is provided during a typical day. Patient has adequate space within her home to safely use the wheelchair and has not indicated an unwillingness to use the wheelchair provided in the home.    Ambulance transport scheduled for 4:30pm.  PCS form completed and available for RN to print.  Updated RN.  Paged MD for documentation needed for wheelchair.  / to remain available for support and/or discharge planning.     Debra Jerome, [facility]  Discharge Planner  [phone]    Addendum:  Referral sent to [facility] for wheelchair.  Spoke with Keturah from [facility] who stated she will arrange for delivery to pt's home once it is approved.  Anticipate delivery tomorrow.

## 2024-11-20 NOTE — CM/SW NOTE
Hospital bed approved through [facility].  They will contact pt's dtr directly with delivery time.  Met with pt's dtr/son at bedside.  Discussed plan for discharge today once bed delivered.  / to remain available for support and/or discharge planning.     Debra Jerome, [facility]  Discharge Planner  [phone]

## 2024-11-20 NOTE — PLAN OF CARE
NURSING DISCHARGE NOTE    Discharged Home via Ambulance.  Accompanied by Family member  Belongings Taken by patient/family.    AVS printed and discussed, IV removed, Rx's e-scribed, reminded to  medicine at pharmacy. Pt ready to discharge home w/ HH via ambulance.

## 2024-11-30 ENCOUNTER — APPOINTMENT (OUTPATIENT)
Dept: CT IMAGING | Facility: HOSPITAL | Age: 89
End: 2024-11-30
Attending: EMERGENCY MEDICINE
Payer: MEDICARE

## 2024-11-30 ENCOUNTER — HOSPITAL ENCOUNTER (INPATIENT)
Facility: HOSPITAL | Age: 89
Discharge: HOME HEALTH | End: 2024-11-30
Attending: EMERGENCY MEDICINE | Admitting: INTERNAL MEDICINE
Payer: MEDICARE

## 2024-11-30 ENCOUNTER — APPOINTMENT (OUTPATIENT)
Dept: GENERAL RADIOLOGY | Facility: HOSPITAL | Age: 89
End: 2024-11-30
Attending: EMERGENCY MEDICINE
Payer: MEDICARE

## 2024-11-30 ENCOUNTER — HOSPITAL ENCOUNTER (OUTPATIENT)
Facility: HOSPITAL | Age: 89
Setting detail: OBSERVATION
LOS: 5 days | Discharge: HOME | End: 2024-12-05
Attending: EMERGENCY MEDICINE | Admitting: INTERNAL MEDICINE
Payer: MEDICARE

## 2024-11-30 DIAGNOSIS — R55 SYNCOPE AND COLLAPSE: Primary | ICD-10-CM

## 2024-11-30 DIAGNOSIS — R79.89 ELEVATED TROPONIN: ICD-10-CM

## 2024-11-30 PROBLEM — E87.1 HYPONATREMIA: Status: ACTIVE | Noted: 2024-11-30

## 2024-11-30 PROBLEM — D64.9 ANEMIA: Status: ACTIVE | Noted: 2024-11-30

## 2024-11-30 LAB
ALBUMIN SERPL-MCNC: 3.5 G/DL (ref 3.2–4.8)
ALBUMIN/GLOB SERPL: 1.2 {RATIO} (ref 1–2)
ALP LIVER SERPL-CCNC: 117 U/L
ALT SERPL-CCNC: 28 U/L
ANION GAP SERPL CALC-SCNC: 7 MMOL/L (ref 0–18)
AST SERPL-CCNC: 25 U/L (ref ?–34)
BASOPHILS # BLD AUTO: 0.04 X10(3) UL (ref 0–0.2)
BASOPHILS NFR BLD AUTO: 0.7 %
BILIRUB SERPL-MCNC: 0.8 MG/DL (ref 0.2–0.9)
BUN BLD-MCNC: 18 MG/DL (ref 9–23)
CALCIUM BLD-MCNC: 9.5 MG/DL (ref 8.7–10.4)
CHLORIDE SERPL-SCNC: 101 MMOL/L (ref 98–112)
CHOLEST SERPL-MCNC: 136 MG/DL (ref ?–200)
CK SERPL-CCNC: 43 U/L
CK SERPL-CCNC: 44 U/L
CO2 SERPL-SCNC: 27 MMOL/L (ref 21–32)
CREAT BLD-MCNC: 0.76 MG/DL
EGFRCR SERPLBLD CKD-EPI 2021: 72 ML/MIN/1.73M2 (ref 60–?)
EOSINOPHIL # BLD AUTO: 0.13 X10(3) UL (ref 0–0.7)
EOSINOPHIL NFR BLD AUTO: 2.1 %
ERYTHROCYTE [DISTWIDTH] IN BLOOD BY AUTOMATED COUNT: 14 %
GLOBULIN PLAS-MCNC: 3 G/DL (ref 2–3.5)
GLUCOSE BLD-MCNC: 121 MG/DL (ref 70–99)
HCT VFR BLD AUTO: 33.6 %
HDLC SERPL-MCNC: 63 MG/DL (ref 40–59)
HGB BLD-MCNC: 10.8 G/DL
IMM GRANULOCYTES # BLD AUTO: 0.03 X10(3) UL (ref 0–1)
IMM GRANULOCYTES NFR BLD: 0.5 %
LDLC SERPL CALC-MCNC: 54 MG/DL (ref ?–100)
LYMPHOCYTES # BLD AUTO: 1.27 X10(3) UL (ref 1–4)
LYMPHOCYTES NFR BLD AUTO: 20.7 %
MCH RBC QN AUTO: 24.7 PG (ref 26–34)
MCHC RBC AUTO-ENTMCNC: 32.1 G/DL (ref 31–37)
MCV RBC AUTO: 76.9 FL
MONOCYTES # BLD AUTO: 0.44 X10(3) UL (ref 0.1–1)
MONOCYTES NFR BLD AUTO: 7.2 %
NEUTROPHILS # BLD AUTO: 4.22 X10 (3) UL (ref 1.5–7.7)
NEUTROPHILS # BLD AUTO: 4.22 X10(3) UL (ref 1.5–7.7)
NEUTROPHILS NFR BLD AUTO: 68.8 %
NONHDLC SERPL-MCNC: 73 MG/DL (ref ?–130)
OSMOLALITY SERPL CALC.SUM OF ELEC: 283 MOSM/KG (ref 275–295)
PLATELET # BLD AUTO: 306 10(3)UL (ref 150–450)
POTASSIUM SERPL-SCNC: 4 MMOL/L (ref 3.5–5.1)
PROT SERPL-MCNC: 6.5 G/DL (ref 5.7–8.2)
RBC # BLD AUTO: 4.37 X10(6)UL
SODIUM SERPL-SCNC: 135 MMOL/L (ref 136–145)
TRIGL SERPL-MCNC: 108 MG/DL (ref 30–149)
TROPONIN I SERPL HS-MCNC: 49 NG/L
TROPONIN I SERPL HS-MCNC: 51 NG/L
TROPONIN I SERPL HS-MCNC: 52 NG/L
VLDLC SERPL CALC-MCNC: 16 MG/DL (ref 0–30)
WBC # BLD AUTO: 6.1 X10(3) UL (ref 4–11)

## 2024-11-30 PROCEDURE — 70450 CT HEAD/BRAIN W/O DYE: CPT | Performed by: EMERGENCY MEDICINE

## 2024-11-30 PROCEDURE — 71045 X-RAY EXAM CHEST 1 VIEW: CPT | Performed by: EMERGENCY MEDICINE

## 2024-11-30 RX ORDER — SODIUM CHLORIDE 9 MG/ML
INJECTION, SOLUTION INTRAVENOUS CONTINUOUS
Status: DISCONTINUED | OUTPATIENT
Start: 2024-11-30 | End: 2024-12-01

## 2024-11-30 RX ORDER — LISINOPRIL 10 MG/1
10 TABLET ORAL DAILY
Status: DISCONTINUED | OUTPATIENT
Start: 2024-12-01 | End: 2024-11-30 | Stop reason: DRUGHIGH

## 2024-11-30 RX ORDER — ENOXAPARIN SODIUM 100 MG/ML
40 INJECTION SUBCUTANEOUS NIGHTLY
Status: DISCONTINUED | OUTPATIENT
Start: 2024-11-30 | End: 2024-12-02

## 2024-11-30 RX ORDER — SODIUM CHLORIDE 9 MG/ML
125 INJECTION, SOLUTION INTRAVENOUS CONTINUOUS
Status: DISCONTINUED | OUTPATIENT
Start: 2024-11-30 | End: 2024-11-30

## 2024-11-30 RX ORDER — LISINOPRIL 10 MG/1
10 TABLET ORAL 2 TIMES DAILY
Status: DISCONTINUED | OUTPATIENT
Start: 2024-11-30 | End: 2024-12-02

## 2024-11-30 RX ORDER — ACETAMINOPHEN 500 MG
500 TABLET ORAL EVERY 4 HOURS PRN
Status: DISCONTINUED | OUTPATIENT
Start: 2024-11-30 | End: 2024-12-05

## 2024-11-30 RX ORDER — CARVEDILOL 12.5 MG/1
12.5 TABLET ORAL 2 TIMES DAILY WITH MEALS
Status: DISCONTINUED | OUTPATIENT
Start: 2024-11-30 | End: 2024-12-02

## 2024-11-30 RX ORDER — ASPIRIN 81 MG/1
81 TABLET, CHEWABLE ORAL ONCE
Status: COMPLETED | OUTPATIENT
Start: 2024-11-30 | End: 2024-11-30

## 2024-11-30 RX ORDER — ASPIRIN 81 MG/1
81 TABLET ORAL DAILY
Status: DISCONTINUED | OUTPATIENT
Start: 2024-11-30 | End: 2024-12-05

## 2024-11-30 RX ORDER — PANTOPRAZOLE SODIUM 40 MG/1
40 TABLET, DELAYED RELEASE ORAL
Status: DISCONTINUED | OUTPATIENT
Start: 2024-12-01 | End: 2024-11-30 | Stop reason: DRUGHIGH

## 2024-11-30 RX ORDER — PANTOPRAZOLE SODIUM 20 MG/1
20 TABLET, DELAYED RELEASE ORAL
Status: DISCONTINUED | OUTPATIENT
Start: 2024-12-01 | End: 2024-12-05

## 2024-11-30 RX ORDER — SODIUM CHLORIDE 9 MG/ML
INJECTION, SOLUTION INTRAVENOUS CONTINUOUS
Status: DISCONTINUED | OUTPATIENT
Start: 2024-11-30 | End: 2024-11-30

## 2024-11-30 RX ORDER — ONDANSETRON 2 MG/ML
4 INJECTION INTRAMUSCULAR; INTRAVENOUS EVERY 4 HOURS PRN
Status: ACTIVE | OUTPATIENT
Start: 2024-11-30 | End: 2024-11-30

## 2024-11-30 RX ORDER — FLUTICASONE PROPIONATE AND SALMETEROL 500; 50 UG/1; UG/1
1 POWDER RESPIRATORY (INHALATION) 2 TIMES DAILY
Status: DISCONTINUED | OUTPATIENT
Start: 2024-11-30 | End: 2024-12-05

## 2024-11-30 NOTE — CONSULTS
[facility]  Report of Consultation    Vargas Power Patient Status:  Inpatient    6/15/1929 MRN [id number]   [facility] 8NE-A Attending Shankar Tubbs MD   Hosp Day # 0 PCP Kyler Washington MD     Reason for Consultation:   Syncope    History of Present Illness:   Vargas Power is a(n) 89 year old female with a past history of HTN, 1 AVB, RBBB, NSVT, and past syncope.  Syncope in the past has been associated with +Troponin.  She has never had an ischemic assessment, as this was declined due to her advanced age.  Pt was recently hospitalized with vertebral compression Fx.  She was discharged to the home  environment.  She arose from bed and was seated at her commode for a BM, when she had a syncopal episode. Family caught her before any fall or injury.  Promptly regained consciousness.  No palpitations.  No CP.  No SOB.  Brought to ER where Troponin elevation is again noted.  Pt was recently discharged without Norvasc - she has still been taking it at home.  Family supplements Hx due to language barrier.        Past Medical History:   Past Medical History:    Arrhythmia    palpitations    Asthma    Benign neoplasm of kidney, except pelvis    no change  to     Chest pain, unspecified     stress echo neg    Cholelithiasis    asymptomatic    Esophageal reflux    : EGD    Helicobacter pylori (H. pylori)    treated    HTN    lytes wnl     Irritable bowel syndrome    longstanding chronic abdominal pain    Osteoporosis    fosamax [id number] (SE)    Other and unspecified ovarian cyst    complex cyst (Dr. Cid)    Urge incontinence    Vitamin D deficiency       Social History:   Smoking:  None  Alcohol:  None    Family History:   No family history of premature arthrosclerotic heart disease     Medications:   Scheduled:    aspirin  81 mg Oral Daily       Continuous Infusion:    sodium chloride         PRN Medications:     ondansetron    Outpatient Medications:   Medications  Ordered Prior to Encounter[1]    Allergies:   Allergies[2]    Review of Systems:   No fevers, chills, change in weight or bowel habits.  Ten point review of systems is otherwise negative or unremarkable.    Physical Exam:   Vitals:    11/30/24 1300   BP: 152/48   Pulse: 57   Resp: 12   Temp:      Wt Readings from Last 3 Encounters:   11/30/24 95 lb (43.1 kg)   11/16/24 95 lb (43.1 kg)   05/26/24 97 lb (44 kg)           General: Well developed, well nourished female.  Pt is in no acute distress.  HEENT:   Normocephalic.  Atraumatic.  Eyes with no scleral icterus.  Neck: Supple.  No JVD.  Carotids 2+ and equal in symmetric fashion.  No bruits are noted.  Cardiac: Regular rate and rhythm.   There is a normal S1 and S2.  No S3 or S4.  Grade 1/6 JYOTI LSB.  No rubs or gallops.  PMI is non-displaced with a normal apical impulse.  Lungs: Clear to ascultation bilaterally.  No focal rales, rhonchi, or wheezes.  Good air movement is noted throughout all lung fields.  Abdomen: Soft.  Non-distended.  Non-tender.  Bowel sounds are present and normoactive.  No guarding or rebound.   Extremities: Extremities do not demonstrate any evidence of peripheral edema.   No cyanosis or clubbing of the digits is appreciated.  Femoral, Dorsalis Pedis, and Posterior Tibialis  pulses are 2+ and equal in a symmetric fashion.  Neurologic: Alert and oriented, normal affect.  No gross deficit appreciated.  Integument:  No visible rashes are appreciated.      Laboratories and Data:   Labs:    Recent Labs   Lab 11/30/24  1101   *   BUN 18   CREATSERUM 0.76   CA 9.5   ALB 3.5   *   K 4.0      CO2 27.0   ALKPHO 117   AST 25   ALT 28   BILT 0.8   TP 6.5       Recent Labs   Lab 11/30/24  1101   RBC 4.37   HGB 10.8*   HCT 33.6*   MCV 76.9*   MCH 24.7*   MCHC 32.1   RDW 14.0   NEPRELIM 4.22   WBC 6.1   .0       No results for input(s): \"PTP\", \"INR\" in the last 168 hours.    No results for input(s): \"TROP\", \"CK\" in the last 168  hours.    Diagnostics:   Tele:  SR  EKG: SB.  1 AVB.  RBBB        Assessment:   Syncope   -?Hypovolemia   -?Vagal straining for BM   -?Arrhythmia   -?Medication effect  2.     +Troponin    -Minimal elevation   -Similar reported with syncope in the past   -Declined ischemic assessment at [facility] time   -No ACS Sx   -No acute ischemic ECG changes  3.      HTN  4.      Sinus bradycardia on ECG  5.      1 AVB  6.       RBBB  7.       Recent vertebral compression Fx  8.       Anemia      Plan:   EcASA   Reduce Coreg   ACE I   -Titrate as hemodynamics dictate  4.    Serial CV iso  5.    IV fluids  6.    Echo  7.    Tele monitor  8.    Check orthostatics    Piotr Guzman MD  2024  3:34 PM         [1]   Current Facility-Administered Medications on File Prior to Encounter   Medication Dose Route Frequency Provider Last Rate Last Admin    [COMPLETED] magnesium oxide (Mag-Ox) tab 400 mg  400 mg Oral Once Latonya Cyr MD   400 mg at 24 1144    [COMPLETED] potassium chloride (Klor-Con) 20 MEQ oral powder 40 mEq  40 mEq Oral Once Latonya Cyr MD   40 mEq at 24 0733    [COMPLETED] calcitonin (salmon) (Miacalcin) 200 UNIT/ACT nasal solution 1 spray  1 spray Alternating Nares Once Latonya Cyr MD   1 spray at 24 2018    [COMPLETED] potassium chloride (Klor-Con) 20 MEQ oral powder 40 mEq  40 mEq Oral Once Shad Kellogg DO   40 mEq at 24 0026    [] ketorolac (Toradol) 15 MG/ML injection 15 mg  15 mg Intravenous Q6H Latonya Cyr MD   15 mg at 24 0511    [COMPLETED] morphINE PF 4 MG/ML injection 2 mg  2 mg Intravenous Once Michael Otero MD   2 mg at 11/15/24 2202     Current Outpatient Medications on File Prior to Encounter   Medication Sig Dispense Refill    ASPIRIN 81 MG Oral Chew Tab CHEW AND SWALLOW 1 TABLET(81 MG) BY MOUTH DAILY 90 tablet 0    carvedilol 25 MG Oral Tab Take 1 tablet (25 mg total) by mouth 2 (two) times daily with meals. 180 tablet 3    lisinopril 10 MG Oral Tab Take 1  tablet (10 mg total) by mouth 2 (two) times daily. 180 tablet 3    tiZANidine HCl 2 MG Oral Cap Take 1 capsule (2 mg total) by mouth 3 (three) times daily as needed for Muscle spasms. 30 capsule 0    lidocaine-menthol 4-1 % External Patch Place 2 patches onto the skin daily. 30 patch 0    calcitonin, salmon, 200 UNIT/ACT Nasal Solution 1 spray by Nasal route daily for 14 days. 14 each 0    OMEPRAZOLE 20 MG Oral Capsule Delayed Release TAKE 1 CAPSULE(20 MG) BY MOUTH EVERY MORNING BEFORE BREAKFAST 90 capsule 0    triamcinolone 0.1 % External Ointment APPLY TO THE AFFECTED AREA ON THE BODY TWICE DAILY 454 g 0    ONETOUCH ULTRA In Vitro Strip TEST BLOOD SUGAR ONCE DAILY 100 strip 0    Lancets (ONETOUCH DELICA PLUS [id number]) Does not apply Misc 1 each by Other route daily. 100 each 1    ADVAIR DISKUS 500-50 MCG/DOSE Inhalation Aerosol Powder, Breath Activated INHALE 1 PUFF INTO THE LUNGS TWICE DAILY 180 each 0    hydrOXYzine HCl 25 MG Oral Tab Take 1 tab PO 30 minutes before going to bed 30 tablet 2    Cholecalciferol 2000 units Oral Cap Take 2,000 Units by mouth daily.      Cyanocobalamin (B-12) 100 MCG Oral Tab Take 100 mcg by mouth daily.     [2]   Allergies  Allergen Reactions    No Known Allergies

## 2024-11-30 NOTE — PLAN OF CARE
Patient was an admit from the Ed today. Family at bedside.  Patient  came to the ED with complaints of a syncopal episode.Per family member, she was sitting on the toilet.  Patient denies any fall or head injury. Complains of pain in her L flank- daughter brought patient's back brace from home. Patient is A/Ox3. Cambodian speaking. Family at bedside to translate. On RA. On tele- NSR with a first degree AV block- with PVCs. Patient is incontinent- pure wick in place. Activity wise patient is total assist. Call light in reach, bed alarm on.

## 2024-11-30 NOTE — ED PROVIDER NOTES
Patient Seen in: [facility] Emergency Department      History     Chief Complaint   Patient presents with    Syncope     Stated Complaint:     Subjective:   HPI      This is a 89-year-old female who arrives here with complaints of syncopal episode.  The patient denies any fall or head injury the patient was in the bed.  They were trying to get her up she had not slept well because she did change the mattresses and then stated that that she can is was restless all night.  Did not sleep well.  And then she basically the tried to get her up and she had an episode where she kind of was unresponsive according to the family if show breathing but not was talking to them and not moving any extremities.  It lasted for less than a minute.  The patient has no complaints presently she has no headache no chest pain no shortness of breath did not have any headache or chest pain at that time denies any abdominal pain denies any lower extremity pain numbness or weakness.  The patient was moving all of her extremities.  .  The patient has no complaints of chest pain shortness of breath.  Or abdominal pain.  Objective:     No pertinent past medical history.            No pertinent past surgical history.              No pertinent social history.                Physical Exam     ED Triage Vitals   BP 11/30/24 1032 157/50   Pulse 11/30/24 1032 66   Resp 11/30/24 1032 18   Temp 11/30/24 1032 97.1 °F (36.2 °C)   Temp src 11/30/24 1032 Temporal   SpO2 11/30/24 1032 100 %   O2 Device 11/30/24 1300 None (Room air)       Current Vitals:   Vital Signs  BP: 152/48  Pulse: 57  Resp: 12  Temp: 97.1 °F (36.2 °C)  Temp src: Temporal  MAP (mmHg): 76    Oxygen Therapy  SpO2: 99 %  O2 Device: None (Room air)        Physical Exam  General: .  The patient is here with family this the patient is a frail looking older female.  The patient is in no respiratory distress    HEENT: Atraumatic, conjunctiva are not pale.  There is no icterus.  Oral  mucosa Is wet.  No facial trauma.  The neck is supple.    LUNGS: Clear to auscultation, there is no wheezing or retraction.  No crackles.    CV: Cardiovascular is regular without murmurs or rubs.    ABD: The abdomen is soft nondistended nontender.  There is no rebound.  There is no guarding.  Known history of spinal fractures in the thoracic and lumbar area current the family but this is not new for her and there was no direct trauma she has some mild pain with movement.  But this is chronic for the patient according the family.   EXT: There is good pulses bilaterally.  There is no calf tenderness.  There is no rash noted.  There is no edema    NEURO: Alert and oriented x4.  Muscle strength and sensory exam is grossly normal.  And the patient is neurologically intact with no focal findings.  Patient has no pronator drift..  Moving all extremities no focal findings      ED Course     Labs Reviewed   COMP METABOLIC PANEL (14) - Abnormal; Notable for the following components:       Result Value    Glucose 121 (*)     Sodium 135 (*)     All other components within normal limits   CBC WITH DIFFERENTIAL WITH PLATELET - Abnormal; Notable for the following components:    HGB 10.8 (*)     HCT 33.6 (*)     MCV 76.9 (*)     MCH 24.7 (*)     All other components within normal limits   TROPONIN I HIGH SENSITIVITY - Abnormal; Notable for the following components:    Troponin I (High Sensitivity) 49 (*)     All other components within normal limits   LIPID PANEL - Abnormal; Notable for the following components:    HDL Cholesterol 63 (*)     All other components within normal limits   URINALYSIS WITH CULTURE REFLEX                   The patient was placed on monitors, IV was started, blood was drawn.     Workup was done to rule out acute coronary syndrome, electrolyte imbalance.    The EKG show sinus bradycardia there is no acute ST elevations or ischemic findings.  The rest of the EKG including rate rhythm axis and intervals I  agree with the EKG report . The rate is 56 there is findings of referred first-degree AV block..  Previous EKG from August 2019 shows first-degree AV block.  The QRS morphology is not significantly different.  The patient's QT is 452.  QRS durations 124..  Right bundle branch block was noted which was seen previously on the previous EKG.    MDM      The patient's troponin was mildly elevated at 49, comprehensive was grossly negative, CBC shows a hemoglobin 10.8.,  Urinalysis ordered.    Admission disposition: 11/30/2024  1:33 PM       The patient's troponin was minimally elevated,.  The patient's CBC shows a white count 6.1 hemoglobin 10.8 platelet count 305, comprehensive was grossly negative.  The patient urinalysis was ordered.        I personally reviewed the radiographs and my individual interpretation shows    CT of the brain shows no acute pathology, chest x-ray shows no obvious pneumonia.  Shows some interstitial changes chronic.    Also reviewed official report and it shows  CT BRAIN OR HEAD (CPT=70450)    Result Date: 11/30/2024  PROCEDURE:  CT BRAIN OR HEAD (73663)  COMPARISON:  [facility] , CT, CT BRAIN OR HEAD (70625), 8/10/2019, 9:26 PM.  INDICATIONS:  ams syncopal episode altered mental status  TECHNIQUE:  Noncontrast CT scanning is performed through the brain. Dose reduction techniques were used. Dose information is transmitted to the ACR (American College of Radiology) NRDR (National Radiology Data Registry) which includes the Dose Index Registry.  PATIENT STATED HISTORY: (As transcribed by Technologist)  Patient c/o having a syncope episode today. Patient denies any fall or any head injury during episode.    FINDINGS:   VENTRICLES/SULCI:   Ventricles and sulci are prominent indicating atrophy.  The atrophy involves cerebrum as well as cerebellum  INTRACRANIAL:  There are no abnormal extraaxial fluid collections.  There is no midline shift.  There are no acute appearing intraparenchymal brain  abnormalities.  There is nothing specific for acute territorial infarct.  There is no hemorrhage or mass lesion.  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  These changes are confluent extensive comma similar to the prior.  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 This measures  about 4 x 14 mm.  SINUSES:  No acute sinusitis.   MASTOIDS:  The mastoids are clear.  SKULL:  No evidence for fracture or acute osseous abnormality.  OTHER:  None.            CONCLUSION:  Chronic changes in the brain, as described, including cerebral and cerebellar atrophy and stable benign-appearing interhemispheric lipoma, but no acute intracranial bleed, or other acute intracranial process identified.   LOCATION:  [facility]   Dictated by (CST): Reg Mendoza MD on 11/30/2024 at 1:24 PM     Finalized by (CST): Reg Mendoza MD on 11/30/2024 at 1:26 PM       XR CHEST AP PORTABLE  (CPT=71045)    Result Date: 11/30/2024  PROCEDURE:  XR CHEST AP PORTABLE  (CPT=71045)  TECHNIQUE:  AP chest radiograph was obtained.  COMPARISON:  [facility] , XR, XR CHEST AP PORTABLE  (CPT=71045), 8/31/2019, 9:49 AM.  INDICATIONS:  sycope  PATIENT STATED HISTORY: (As transcribed by Technologist)  Patient offered no additional history at this time    FINDINGS:  The heart is normal in size.  The lungs are clear.  There are no pleural effusions.  The bones demonstrate mild dextroscoliosis and mild degenerative changes in spine            CONCLUSION:  No acute cardiopulmonary process.   LOCATION:  [facility]      Dictated by (CST): Mao Reno MD on 11/30/2024 at 11:40 AM     Finalized by (CST): Mao Reno MD on 11/30/2024 at 11:40 AM     The patient I did talk to her the granddaughter who is a pediatrician and they said that she had a previous troponins that were elevated and had a workup at [facility].  She had been cleared by cardiology at that time.  The patient's case was  discussed with the patient at length..  She had been hospitalized in November 20 for compression fracture the family states the pain is still there and would at some point would like to get radiology involved for possible kyphoplasty..  She is neurologically intact at this present the cause of her syncope is unknown.  The family states this has happened to her multiple times.  I discussed this case with the patient and the family at length.  But due to the elevated troponin the syncopal episode felt it would be reasonable for her to be admitted the patient will be admitted for further evaluation treatment discussed this case with the [facility] hospitalist will waiting for [facility] cardiology to call back and we will talk to them.  Patient will be admitted for further evaluation treatment.  I reexamined her she is laying in bed no respiratory discomfort no chest or abdominal pain at this present time.  Medical Decision Making      Disposition and Plan     Clinical Impression:  1. Syncope and collapse    2. Elevated troponin         Disposition:  Admit  11/30/2024  1:33 pm    Follow-up:  No follow-up provider specified.        Medications Prescribed:  Current Discharge Medication List              Supplementary Documentation:         Hospital Problems       Present on Admission  Date Reviewed: 3/21/2022            ICD-10-CM Noted POA    * (Principal) Syncope and collapse R55 4/5/2017 Unknown    Anemia D64.9 11/30/2024 Yes    Hyponatremia E87.1 11/30/2024 Yes

## 2024-11-30 NOTE — ED QUICK NOTES
Orders for admission, patient is aware of plan and ready to go upstairs. Any questions, please call ED RN Tanner at extension [phone].     Patient Covid vaccination status: Fully vaccinated     COVID Test Ordered in ED: None    COVID Suspicion at Admission: N/A    Running Infusions:    sodium chloride 125 mL/hr (11/30/24 1120)        Mental Status/LOC at time of transport: x3    Other pertinent information:   CIWA score: N/A   NIH score:  N/A

## 2024-11-30 NOTE — ED INITIAL ASSESSMENT (HPI)
Syncope event while on toilet, denies any complaints at this time.   VSS per EMS denies any fall or any head injury

## 2024-12-01 ENCOUNTER — APPOINTMENT (OUTPATIENT)
Dept: GENERAL RADIOLOGY | Facility: HOSPITAL | Age: 89
End: 2024-12-01
Attending: HOSPITALIST
Payer: MEDICARE

## 2024-12-01 ENCOUNTER — APPOINTMENT (OUTPATIENT)
Dept: CV DIAGNOSTICS | Facility: HOSPITAL | Age: 89
End: 2024-12-01
Attending: INTERNAL MEDICINE
Payer: MEDICARE

## 2024-12-01 LAB
ALBUMIN SERPL-MCNC: 3.4 G/DL (ref 3.2–4.8)
ALBUMIN/GLOB SERPL: 1.9 {RATIO} (ref 1–2)
ALP LIVER SERPL-CCNC: 108 U/L
ALT SERPL-CCNC: 22 U/L
ANION GAP SERPL CALC-SCNC: 8 MMOL/L (ref 0–18)
AST SERPL-CCNC: 19 U/L (ref ?–34)
BASOPHILS # BLD AUTO: 0.03 X10(3) UL (ref 0–0.2)
BASOPHILS NFR BLD AUTO: 0.5 %
BILIRUB SERPL-MCNC: 0.9 MG/DL (ref 0.2–0.9)
BILIRUB UR QL STRIP.AUTO: NEGATIVE
BUN BLD-MCNC: 17 MG/DL (ref 9–23)
CALCIUM BLD-MCNC: 8.5 MG/DL (ref 8.7–10.4)
CHLORIDE SERPL-SCNC: 104 MMOL/L (ref 98–112)
CK SERPL-CCNC: 44 U/L
CLARITY UR REFRACT.AUTO: CLEAR
CO2 SERPL-SCNC: 25 MMOL/L (ref 21–32)
CREAT BLD-MCNC: 0.72 MG/DL
EGFRCR SERPLBLD CKD-EPI 2021: 77 ML/MIN/1.73M2 (ref 60–?)
EOSINOPHIL # BLD AUTO: 0.11 X10(3) UL (ref 0–0.7)
EOSINOPHIL NFR BLD AUTO: 1.9 %
ERYTHROCYTE [DISTWIDTH] IN BLOOD BY AUTOMATED COUNT: 14 %
GLOBULIN PLAS-MCNC: 1.8 G/DL (ref 2–3.5)
GLUCOSE BLD-MCNC: 97 MG/DL (ref 70–99)
GLUCOSE UR STRIP.AUTO-MCNC: NORMAL MG/DL
HCT VFR BLD AUTO: 28.9 %
HGB BLD-MCNC: 9.7 G/DL
IMM GRANULOCYTES # BLD AUTO: 0.02 X10(3) UL (ref 0–1)
IMM GRANULOCYTES NFR BLD: 0.3 %
KETONES UR STRIP.AUTO-MCNC: NEGATIVE MG/DL
LEUKOCYTE ESTERASE UR QL STRIP.AUTO: NEGATIVE
LYMPHOCYTES # BLD AUTO: 1.87 X10(3) UL (ref 1–4)
LYMPHOCYTES NFR BLD AUTO: 31.5 %
MAGNESIUM SERPL-MCNC: 1.6 MG/DL (ref 1.6–2.6)
MCH RBC QN AUTO: 25.2 PG (ref 26–34)
MCHC RBC AUTO-ENTMCNC: 33.6 G/DL (ref 31–37)
MCV RBC AUTO: 75.1 FL
MONOCYTES # BLD AUTO: 0.5 X10(3) UL (ref 0.1–1)
MONOCYTES NFR BLD AUTO: 8.4 %
NEUTROPHILS # BLD AUTO: 3.4 X10 (3) UL (ref 1.5–7.7)
NEUTROPHILS # BLD AUTO: 3.4 X10(3) UL (ref 1.5–7.7)
NEUTROPHILS NFR BLD AUTO: 57.4 %
NITRITE UR QL STRIP.AUTO: NEGATIVE
OSMOLALITY SERPL CALC.SUM OF ELEC: 285 MOSM/KG (ref 275–295)
PH UR STRIP.AUTO: 6.5 [PH] (ref 5–8)
PLATELET # BLD AUTO: 266 10(3)UL (ref 150–450)
POTASSIUM SERPL-SCNC: 3.5 MMOL/L (ref 3.5–5.1)
POTASSIUM SERPL-SCNC: 4.5 MMOL/L (ref 3.5–5.1)
PROT SERPL-MCNC: 5.2 G/DL (ref 5.7–8.2)
PROT UR STRIP.AUTO-MCNC: 30 MG/DL
RBC # BLD AUTO: 3.85 X10(6)UL
RBC UR QL AUTO: NEGATIVE
SODIUM SERPL-SCNC: 137 MMOL/L (ref 136–145)
SP GR UR STRIP.AUTO: 1.02 (ref 1–1.03)
TROPONIN I SERPL HS-MCNC: 51 NG/L
UROBILINOGEN UR STRIP.AUTO-MCNC: NORMAL MG/DL
WBC # BLD AUTO: 5.9 X10(3) UL (ref 4–11)

## 2024-12-01 PROCEDURE — 93306 TTE W/DOPPLER COMPLETE: CPT | Performed by: INTERNAL MEDICINE

## 2024-12-01 PROCEDURE — 72110 X-RAY EXAM L-2 SPINE 4/>VWS: CPT | Performed by: HOSPITALIST

## 2024-12-01 RX ORDER — MAGNESIUM OXIDE 400 MG/1
400 TABLET ORAL ONCE
Status: COMPLETED | OUTPATIENT
Start: 2024-12-01 | End: 2024-12-01

## 2024-12-01 RX ORDER — POTASSIUM CHLORIDE 1.5 G/1.58G
40 POWDER, FOR SOLUTION ORAL EVERY 4 HOURS
Status: COMPLETED | OUTPATIENT
Start: 2024-12-01 | End: 2024-12-01

## 2024-12-01 NOTE — PLAN OF CARE
Assumed care of patient at 1930.   IVF running per order.   Pt is Aox4, Cambodian speaking. Family at the bedside.   Patient is on room air.  Complains of no difficulty breathing.  Lung sounds clear.   NSR with a first degree AV block and frequent PVCs and PACs.   Pt is on a reg diet.   Pt is incontinent, purewick in place.   Complains of some chronic back pain, tylenol given per order.   Skin in unremarkable.   Bed at lowest position, room cleared of clutter, bed alarm is on, and call light is within reach.   POC discussed, fall precautions reviewed, and questions answered.      Problem: Patient/Family Goals  Goal: Patient/Family Long Term Goal  Description: Patient's Long Term Goal: Return home     Interventions:  - cardiac work up   - See additional Care Plan goals for specific interventions  Outcome: Progressing  Goal: Patient/Family Short Term Goal  Description: Patient's Short Term Goal: Monitor for syncopal events.     Interventions:   - Fall precautions   -PT/OT eval   - See additional Care Plan goals for specific interventions  Outcome: Progressing      Detail Level: Zone

## 2024-12-01 NOTE — PROGRESS NOTES
CC: follow-up hospital admission syncope    SUBJECTIVE:  Interval History:     No acute issues overnight  Dw daughter at bs and grand daughter over phone  Her back pain in the hospital is better per daughter, thinks it's from the better mattress she has here, however still pain is severe enough to allow her to ambulate  Pain appears to radiate down to the knee region  No f/c. No urinary symptoms    OBJECTIVE:  Scheduled Meds:    potassium chloride  40 mEq Oral Q4H    aspirin  81 mg Oral Daily    carvedilol  12.5 mg Oral BID with meals    lisinopril  10 mg Oral BID    enoxaparin  40 mg Subcutaneous Nightly    fluticasone-salmeterol  1 puff Inhalation BID    pantoprazole  20 mg Oral QAM AC     Continuous Infusions:   PRN Meds:   acetaminophen    PHYSICAL EXAM  Vital signs: Temp:  [98 °F (36.7 °C)-98.4 °F (36.9 °C)] 98 °F (36.7 °C)  Pulse:  [63-85] 63  Resp:  [14-18] 16  BP: (146-170)/(50-88) 154/50  SpO2:  [98 %-99 %] 99 %      GENERAL - NAD, AAO  EYES- sclera anicteric,    HENT- normocephalic, OP - dry  NECK - no JVD  CV- RRR  RESP - CTAB, normal resp effort  ABDOMEN- soft, NT/ND   EXT- no LE edema strength symmetrical        Data Review:   Labs:   Recent Labs   Lab 11/30/24  1101 12/01/24  0515   WBC 6.1 5.9   HGB 10.8* 9.7*   MCV 76.9* 75.1*   .0 266.0       Recent Labs   Lab 11/30/24  1101 12/01/24  0515   * 137   K 4.0 3.5    104   CO2 27.0 25.0   BUN 18 17   CREATSERUM 0.76 0.72   CA 9.5 8.5*   MG  --  1.6   * 97       Recent Labs   Lab 11/30/24  1101 12/01/24  0515   ALT 28 22   AST 25 19   ALB 3.5 3.4       No results for input(s): \"PGLU\" in the last 168 hours.        ASSESSMENT/PLAN:    Patient is a 89 year old female with PMH sig for htn with labile bp, recent admission for back pain / compression fx (treated conservatively), here for syncope     Impression     -syncope  -trop elevation     -HTN  -TIA hx  -DM 2      Plan     -unclear cause of syncope. Per outpt notes has hx of  labile htn, possibly related to orthostasis , drop in bp, vs vasovagal  -check echo - pending  -tele  -cards consult  -trop mildly up but not acs, appears has been elevated in the past        Back pain  -recently admitted for this. L1 compression fx noted, however pt did not have pain in that level, kypho was no recommended. Appears her smyptoms to be more radicular and may trial steroids. Will obtain repeat imaging first. Family also request to have Dr An see her again to eval for kyphoplasty      Tlso when out of bed given recent compression fx     Scds     Code  dw daughter POA, DNR    Will continue to follow while hospitalized. Please page me or the on-call hospitalist with questions or concerns.    Christopher Rand Hospitalist  [phone]  Answering Service: [phone]

## 2024-12-01 NOTE — PHYSICAL THERAPY NOTE
PT orders received and chart reviewed. Pt actively going off the floor for xray. Will follow and re-attempt as able and appropriate.

## 2024-12-01 NOTE — H&P
[facility] H&P/Consult note       CC:   Chief Complaint   Patient presents with    Syncope        PCP: Kyler Washington MD    History of Present Illness: Patient is a 89 year old female with PMH sig for htn with labile bp, recent admission for back pain / compression fx (treated conservatively), here for syncope  Hx from daughter whoe was with pt.   She was on the commode and started swaying / passed out for about 50 secs. Had gone to commode to have BM. No falls / trauam. Prior to this was in bed. Commode was close to bed. No cp, dizziness reported   In Er found to have elevated trop.     PMH  Past Medical History:    Arrhythmia    palpitations    Asthma    Back problem    Benign neoplasm of kidney, except pelvis    no change 11/01 to 11/02    Blind    Chest pain, unspecified    1/01 stress echo neg    Cholelithiasis    asymptomatic    Esophageal reflux    12/01: EGD    Hearing impaired person, bilateral    Helicobacter pylori (H. pylori)    treated    HTN    lytes wnl 9/08    Irritable bowel syndrome    longstanding chronic abdominal pain    Muscle weakness    Osteoporosis    fosamax [id number] (SE)    Other and unspecified ovarian cyst    complex cyst (Dr. Cid)    Urge incontinence    Visual impairment    Vitamin D deficiency        PSH  Past Surgical History:   Procedure Laterality Date    Colonoscopy,diagnostic  12/01    wnl    Injection, w/wo contrast, dx/therapeutic substance, epidural/subarachnoid; lumbar/sacral N/A 1/29/2016    Procedure: CAUDAL;  Surgeon: Daniel Mclean MD;  Location: [facility]    Patient documented not to have experienced any of the following events N/A 1/29/2016    Procedure: CAUDAL;  Surgeon: Daniel Mclean MD;  Location: [facility]    Patient withough preoperative order for iv antibiotic surgical site infection prophylaxis. N/A 1/29/2016    Procedure: CAUDAL;  Surgeon: Daniel Mclean MD;  Location: [facility]    Upper gi endoscopy,diagnosis  12/01    wnl        ALL:  Allergies[1]     Home Medications:  Medications Taking[2]      Soc Hx  Social History     Tobacco Use    Smoking status: Never    Smokeless tobacco: Never   Substance Use Topics    Alcohol use: No        Fam Hx  Family History   Problem Relation Age of Onset    Heart Disorder Mother         heart failure    Cancer Sister         colon cancer age 89       Review of Systems  Comprehensive ROS reviewed and negative except for what's stated above.       OBJECTIVE:  /58 (BP Location: Left arm)   Pulse 83   Temp 98.2 °F (36.8 °C) (Oral)   Resp 18   Wt 95 lb (43.1 kg)   SpO2 99%   BMI 20.56 kg/m²   General:  Alert, no distress, appears stated age.   Head:  Normocephalic,    Eyes:  Sclera anicteric,    Throat: MMM   Neck: Supple    Lungs:   Clear to auscultation bilaterally. Normal effort   Chest wall:  No tenderness or deformity.   Heart:  Regular rate and rhythm    Abdomen:   Soft, NT/ND,    Extremities: Atraumatic,    Skin: No visible rashes or lesions.    Neurologic: Normal strength, no focal deficit appreciated  Strength symmetrical. No drift, normal ftn     Diagnostic Data:    CBC/Chem  Recent Labs   Lab 11/30/24  1101   WBC 6.1   HGB 10.8*   MCV 76.9*   .0       Recent Labs   Lab 11/30/24  1101   *   K 4.0      CO2 27.0   BUN 18   CREATSERUM 0.76   *   CA 9.5       Recent Labs   Lab 11/30/24  1101   ALT 28   AST 25   ALB 3.5       No results for input(s): \"TROP\" in the last 168 hours.    Additional Diagnostics: ECG: sinus rhythm    CXR: image personally reviewed     Radiology: CT BRAIN OR HEAD (CPT=70450)    Result Date: 11/30/2024  PROCEDURE:  CT BRAIN OR HEAD (88413)  COMPARISON:  [facility] CT, CT BRAIN OR HEAD (10455), 8/10/2019, 9:26 PM.  INDICATIONS:  ams syncopal episode altered mental status  TECHNIQUE:  Noncontrast CT scanning is performed through the brain. Dose reduction techniques were used. Dose  information is transmitted to the ACR (American College of Radiology) NRDR (National Radiology Data Registry) which includes the Dose Index Registry.  PATIENT STATED HISTORY: (As transcribed by Technologist)  Patient c/o having a syncope episode today. Patient denies any fall or any head injury during episode.    FINDINGS:   VENTRICLES/SULCI:   Ventricles and sulci are prominent indicating atrophy.  The atrophy involves cerebrum as well as cerebellum  INTRACRANIAL:  There are no abnormal extraaxial fluid collections.  There is no midline shift.  There are no acute appearing intraparenchymal brain abnormalities.  There is nothing specific for acute territorial infarct.  There is no hemorrhage or mass lesion.  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  These changes are confluent extensive comma similar to the prior.  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 This measures  about 4 x 14 mm.  SINUSES:  No acute sinusitis.   MASTOIDS:  The mastoids are clear.  SKULL:  No evidence for fracture or acute osseous abnormality.  OTHER:  None.            CONCLUSION:  Chronic changes in the brain, as described, including cerebral and cerebellar atrophy and stable benign-appearing interhemispheric lipoma, but no acute intracranial bleed, or other acute intracranial process identified.   LOCATION:  [facility]   Dictated by (CST): Reg Mendoza MD on 11/30/2024 at 1:24 PM     Finalized by (CST): Reg Mendoza MD on 11/30/2024 at 1:26 PM       XR CHEST AP PORTABLE  (CPT=71045)    Result Date: 11/30/2024  PROCEDURE:  XR CHEST AP PORTABLE  (CPT=71045)  TECHNIQUE:  AP chest radiograph was obtained.  COMPARISON:  [facility] , [facility], XR CHEST AP PORTABLE  (CPT=71045), 8/31/2019, 9:49 AM.  INDICATIONS:  sycope  PATIENT STATED HISTORY: (As transcribed by Technologist)  Patient offered no additional history at this time    FINDINGS:  The heart is normal in size.  The  lungs are clear.  There are no pleural effusions.  The bones demonstrate mild dextroscoliosis and mild degenerative changes in spine            CONCLUSION:  No acute cardiopulmonary process.   LOCATION:  [facility]      Dictated by (CST): Mao Reno MD on 11/30/2024 at 11:40 AM     Finalized by (CST): Mao Reno MD on 11/30/2024 at 11:40 AM       XR SACRUM + COCCYX (MIN 2 VIEWS) (CPT=72220)    Result Date: 11/20/2024  PROCEDURE:  XR SACRUM + COCCYX (MIN 2 VIEWS) (CPT=72220)  INDICATIONS:  pain  COMPARISON:  None.  PATIENT STATED HISTORY: (As transcribed by Technologist)  Patient is experiencing sacral pain. Patient offered no additional history at this time.    FINDINGS:  No fracture, dislocation or osseous abnormality.            CONCLUSION:  No abnormality demonstrated in the sacrum and coccyx.     LOCATION:  [facility]   Dictated by (CST): Daniel Gandhi MD on 11/20/2024 at 3:48 PM     Finalized by (CST): Daniel Gandhi MD on 11/20/2024 at 3:48 PM       MRI SPINE LUMBAR (CPT=72148)    Result Date: 11/19/2024  PROCEDURE:  MRI SPINE LUMBAR (CPT=72148)  COMPARISON:  [facility], SPINE LUMBAR W O CONTRAST MRI, 9/20/2008, 7:57 AM.  INDICATIONS:  Back pain, assess compression fractures  TECHNIQUE:  Multiplanar T1 and T2 weighted images including fat suppression sequences.  Images acquired in sagittal and axial planes.   PATIENT STATED HISTORY: (As transcribed by Technologist)  Patient started having a significant lower mid back pain. Pt did not falls or injuries herself.  No numbness or weakness.    FINDINGS:  LUMBAR DISC LEVELS L1-L2:  Disc desiccation with mild diffuse disc bulge and thickening of ligamentum flavum resulting in mild central canal stenosis.  Mild facet arthrosis contributes to mild right-sided and moderate left-sided foraminal stenosis. L2-L3:  Disc desiccation with diffuse disc bulge and thickening of ligamentum flavum resulting in moderate central canal stenosis.  Moderate facet arthrosis  contributes to moderate bilateral foraminal stenosis. L3-L4:  Disc desiccation with diffuse disc bulge and thickening of ligamentum flavum resulting in severe central canal stenosis.  Advanced facet arthrosis contributes to severe right-sided and moderate left-sided foraminal stenosis. L4-L5:  Disc desiccation with diffuse disc bulge and thickening of ligamentum flavum resulting in severe central canal stenosis.  Advanced facet arthrosis contributes to severe right-sided and moderate left-sided foraminal stenosis. L5-S1:  Disc desiccation with diffuse disc bulge and thickening of ligamentum flavum resulting in severe central canal stenosis.  Moderate facet arthrosis contributes to moderate/severe bilateral foraminal stenosis.  PARASPINAL AREA:  No epidural or paraspinal mass or fluid collection. BONY STRUCTURES:  There are 5 lumbar-type vertebral bodies.  Chronic vertebral plana of T12.  Compression fracture of L1 with up to 50 percent height loss centrally.  Mild diffuse marrow edema within the L1 vertebral body indicating an acute or subacute injury.  Additionally noted chronic compression fractures of the L2 and L3 vertebral bodies.  Grade 1 anterolisthesis of L3 on L4, L4 on L5, and L5 on S1, likely on a chronic degenerative basis. CORD/CAUDA EQUINA:  Normal caliber, contour, and signal intensity.             CONCLUSION:   1. Compression fracture of L1 with up to 50 percent height loss centrally.  Diffuse marrow edema of the L1 vertebral body indicating an acute or subacute process.   2. Chronic compression fractures of T12, L2, and L3.  Grade 1 anterolisthesis at multiple levels within the lower lumbar spine, likely on a chronic degenerative basis.   3. Advanced multilevel degenerative change resulting in severe central canal and neural foraminal stenosis at the L3-4, L4-5, L5-S1 levels, as detailed above.  The extent of degenerative change has progressed compared to 2008.   LOCATION:  [facility]   Dictated by  (CST): Adriana Kellogg MD on 11/19/2024 at 10:30 AM     Finalized by (CST): Adriana Kellogg MD on 11/19/2024 at 10:44 AM       CT SPINE LUMBAR (CPT=72131)    Result Date: 11/15/2024  PROCEDURE:  CT SPINE LUMBAR (CPT=72131)  COMPARISON:  None.  INDICATIONS:  patient coming in from DR office, they did xray and patient has cmpression fx  t12, L1L3  TECHNIQUE:  Noncontrast CT scanning is performed through the lumbar spine.  Multiplanar reconstructions are generated.  Dose reduction techniques were used. Dose information is transmitted to the ACR (American College of Radiology) NRDR (National Radiology Data Registry) which includes the Dose Index Registry.  PATIENT STATED HISTORY: (As transcribed by Technologist)  Patient with back pain. Xray from doctor's office showed compression fracture.    FINDINGS:   There is levoconvex scoliosis of the lower lumbar spine.  The osseous structures are demineralized.  There is a severe compression fracture deformity of T12.  There is approximately 3 mm retropulsion of the superior endplate into the spinal canal.  There is approximately 80% loss of vertebral body height.  There is moderate compression fracture deformity of L1 with approximately 50% loss of vertebral body height.  Acute fracture lines are identified in the vertebral body.  There is approximately 3 mm retropulsion of the inferior endplate.  There is a large Schmorl's node identified in the inferior endplate of L2 with findings suggesting an inferior endplate compression deformity with no acute fracture line seen.  There is posterior endplate spurring of the inferior endplate and a disc osteophyte complex.  There is compression fracture deformity of L3 with approximately 55% loss of vertebral body height.  There is a disc osteophyte complex.  No retropulsion is seen.  There is a large Schmorl's node in the inferior endplate of L3 also.  No acute fracture lines are seen.  There is a mild superior compression fracture deformity of  L4.  There is 3 mm anterior subluxation of L3 on 4. There is a disc osteophyte complex at L3-4 also.  No acute fracture seen at L5.  There is multilevel facet arthropathy seen throughout the lumbar spine.  There is no spondylolysis.  Atherosclerotic calcified plaque is seen throughout the visualized abdominal aorta and iliac arteries.  There is a large gallstone within the gallbladder measuring 19 mm in diameter.  Sclerotic changes are seen in the left iliac bone of indeterminate significance.    LUMBAR DISC LEVELS: L1-L2:  Retropulsion at L1-2 and facet arthropathy and diffuse bulging disc narrows the spinal canal to approximately 9 mm consistent with mild spinal canal stenosis.  There is mild neural foraminal narrowing bilaterally.   L2-L3:  There is moderate right and mild to moderate left foraminal narrowing.  There is borderline central spinal canal stenosis.  AP diameter is 10 mm.  L3-L4:  There is bony central spinal canal stenosis.  The AP diameter measures 8 mm.  There is moderate to marked bilateral foraminal stenosis.    L4-L5:  There is central spinal canal stenosis.  AP diameter of the thecal sac measures approximately 8 mm .  There is moderate to marked right and mild left foraminal stenosis. L5-S1:  There is central spinal canal stenosis.  The AP diameter of the thecal sac measures approximately 6 mm.  There is moderate to marked canal stenosis seen bilaterally.  At T10-11 there is no central spinal canal stenosis or significant foraminal stenosis.  At T11-12 the retropulsion of the superior endplate creates canal stenosis measuring approximately 9 mm.  The neural foramina appear patent.             CONCLUSION:  There are compression fracture deformities of T12, L1, L2, L3 and L4.  Please see above for details.  The L1 compression fracture deformity is definitively new as there are acute fracture lines appreciated.  The other fractures are of indeterminate age. 2. Retropulsion of the superior endplate  of T12 by approximately 3 mm. 3. Multilevel spinal canal stenosis and foraminal stenoses are noted as detailed above 4. Incidental large gallstone seen in the gallbladder lumen measuring 19 mm in diameter.  No obvious gallbladder wall thickening. 5. Osseous structures are demineralized. 6. Extensive atheromatous calcified plaque seen within the aorta, aortic branches and iliac arteries.    LOCATION:  [facility]   Dictated by (CST): Lanre Pitt MD on 11/15/2024 at 11:16 PM     Finalized by (CST): Lanre Pitt MD on 11/15/2024 at 11:33 PM          ASSESSMENT / PLAN:     Patient is a 89 year old female with PMH sig for htn with labile bp, recent admission for back pain / compression fx (treated conservatively), here for syncope    Impression    -syncope  -trop elevation    -HTN  -TIA hx  -DM 2     Plan    -unclear cause of syncope. Per outpt notes has hx of labile htn, possibly related to orthostasis , drop in bp, vs vasovagal  -check echo  -tele  -cards consult  -trop mildly up but not acs, appears has been elevated in the past     -start IVF  -bp medication adjustement per cards  Pt ot   Tlso when out of bed given recent compression fx    Scds    Code d-dw daughter POA, DNR      Further recommendations pending patient's clinical course. [facility] hospitalist to continue to follow patient while in house    Patient and/or patient's family given opportunity to ask questions and note understanding and agreeing with therapeutic plan as outlined    Chirstopher Rand Hospitalist  [phone]  Answering Service: [phone]           [1]   Allergies  Allergen Reactions    No Known Allergies    [2]   Outpatient Medications Marked as Taking for the 11/30/24 encounter (Hospital Encounter)   Medication Sig Dispense Refill    calcitonin, salmon, 200 UNIT/ACT Nasal Solution 1 spray by Nasal route daily for 14 days. 14 each 0    ASPIRIN 81 MG Oral Chew Tab CHEW AND SWALLOW 1 TABLET(81 MG) BY MOUTH DAILY 90 tablet 0     carvedilol 25 MG Oral Tab Take 1 tablet (25 mg total) by mouth 2 (two) times daily with meals. 180 tablet 3    lisinopril 10 MG Oral Tab Take 1 tablet (10 mg total) by mouth 2 (two) times daily. 180 tablet 3    Cholecalciferol 2000 units Oral Cap Take 2,000 Units by mouth daily.      Cyanocobalamin (B-12) 100 MCG Oral Tab Take 100 mcg by mouth daily.

## 2024-12-01 NOTE — PROGRESS NOTES
[facility]  Progress Note    [facility] Patient Status:  Inpatient    6/15/1929 MRN [id number]   Location [facility] 8NE-A Attending Shankar Tubbs MD   Hosp Day # 1 PCP Kyler Washington MD     Subjective:   Back pain dominates clinical picture.  No chest pain.  No shortness of breath.  No focal cardiovascular complaints reported.    Objective:  Vitals:    24 2145 24 0029 24 0357 24 0828   BP: 154/50 146/54 (!) 164/54 147/62   BP Location: Left arm Left arm Left arm Left arm   Pulse: 68 67 68 69   Resp: 17 14 16 16   Temp: 98.4 °F (36.9 °C) 98.3 °F (36.8 °C) 98.1 °F (36.7 °C) 98.1 °F (36.7 °C)   TempSrc: Oral Oral Oral Oral   SpO2: 98% 98% 98% 98%   Weight:           Temp (24hrs), Av.1 °F (36.7 °C), Min:97.1 °F (36.2 °C), Max:98.4 °F (36.9 °C)      Medications:   Scheduled:    potassium chloride  40 mEq Oral Q4H    aspirin  81 mg Oral Daily    carvedilol  12.5 mg Oral BID with meals    lisinopril  10 mg Oral BID    enoxaparin  40 mg Subcutaneous Nightly    fluticasone-salmeterol  1 puff Inhalation BID    pantoprazole  20 mg Oral QAM AC       Continuous Infusion:    sodium chloride 50 mL/hr at 24 1637    sodium chloride 83 mL/hr at 24 0619       PRN Medications:     acetaminophen    Intake/Output:     Intake/Output Summary (Last 24 hours) at 2024 0928  Last data filed at 2024 0615  Gross per 24 hour   Intake 360 ml   Output 400 ml   Net -40 ml       Wt Readings from Last 3 Encounters:   24 95 lb (43.1 kg)   24 95 lb (43.1 kg)   24 97 lb (44 kg)       Allergies:  Allergies[1]    Physical Exam:   General:  Well-developed / Well-nourished.  No acute distress.  HEENT:  Normocephalic.  Atraumatic.  No icterus.  Neck:  There is no jugular venous distention.   Cardiovascular:  Cardiovascular examination demonstrates a regular rate and rhythm.  There is normal S1, S2.  There is no S3 or S4.  Grade 1/6 JYOTI LSB.  There are no rubs or  gallops.  No click is appreciated.  PMI is nondisplaced with a normal apical impulse.    Pulmonary:  Lungs are clear to auscultation bilaterally.  There are no focal rales, rhonchi, or wheezes.  Good air movement is noted throughout both lung fields.   Abdomen:  The abdomen is soft, non-distended, and non-tender.  Bowel sounds are present and normoactive.  No organomegaly is appreciated.  Extremities:  Extremities do not demonstrate any evidence of peripheral edema.   No cyanosis or clubbing of the digits is appreciated.  Neurologic:  Alert and oriented.  Normal affect.  Integument:  No visible rashes are appreciated.      Laboratory/Data:   Recent Labs   Lab 11/30/24  1101 12/01/24  0515   * 97   BUN 18 17   CREATSERUM 0.76 0.72   CA 9.5 8.5*   ALB 3.5 3.4   * 137   K 4.0 3.5    104   CO2 27.0 25.0   ALKPHO 117 108   AST 25 19   ALT 28 22   BILT 0.8 0.9   TP 6.5 5.2*       Recent Labs   Lab 11/30/24  1101 12/01/24  0515   RBC 4.37 3.85   HGB 10.8* 9.7*   HCT 33.6* 28.9*   MCV 76.9* 75.1*   MCH 24.7* 25.2*   MCHC 32.1 33.6   RDW 14.0 14.0   NEPRELIM 4.22 3.40   WBC 6.1 5.9   .0 266.0       No results for input(s): \"PTP\", \"INR\" in the last 168 hours.    Recent Labs   Lab 11/30/24  1613 11/30/24  1800 11/30/24  2345   CK 43 44 44         Tele: SR      Assessment:     Syncope                -Suspect vagal with back pain / strain for BM  -?Hypovolemia                -No arrhythmia on tele to date to account for episode                -?Medication effect  2.     +Troponin                 -Minimal elevation.  Atypical \"flat\" pattern                -Similar reported with syncope in the past                -Declined ischemic assessment at [facility] time                -No ACS Sx                -No acute ischemic ECG changes  3.      HTN  4.      Sinus bradycardia on ECG  5.      1 AVB  6.       RBBB  7.       Recent vertebral compression Fx  8.       Anemia    Plan:   n:   EcASA   Reduced dose Coreg    ACE I                -Titrate as hemodynamics dictate  4.    Stop IV fluids  6.    Echo pending  7.    Tele monitor  8.    Check orthostatics  9.    Reviewed Troponin findings with daughter.  Discussed stress testing.  She declines.  Informed of risks of ischemia, infarct, and fatality - she accepts these risks and feels pt is too debilitated. Concur with this perspective.  10.   Family now considering kyphoplasty for back pain relief    Piotr Guzman MD  12/1/2024  9:28 AM         [1]   Allergies  Allergen Reactions    No Known Allergies

## 2024-12-01 NOTE — PLAN OF CARE
Assumed care of patient at 0730. Patient is A/Ox4. Icelandic (cambodian) speaking. Family at bedside. Patient is on RA. Denies any chest pain or shortness of breath. PRN tylenol was given this AM. On tele-NSR; heart block. Incontinent. Pure wick in place. Total assist until PT/OT come to evaluate. Call light in reach.     Problem: Patient/Family Goals  Goal: Patient/Family Long Term Goal  Description: Patient's Long Term Goal: stay out of the hospital    Interventions:  - take meds as prescribed/ attend follow up appointments  - See additional Care Plan goals for specific interventions  Outcome: Progressing  Goal: Patient/Family Short Term Goal  Description: Patient's Short Term Goal: pain free     Interventions:   - take pain meds as prescribed  - See additional Care Plan goals for specific interventions  Outcome: Progressing

## 2024-12-02 LAB
ATRIAL RATE: 56 BPM
MAGNESIUM SERPL-MCNC: 1.6 MG/DL (ref 1.6–2.6)
P AXIS: 110 DEGREES
P-R INTERVAL: 268 MS
Q-T INTERVAL: 452 MS
QRS DURATION: 124 MS
QTC CALCULATION (BEZET): 436 MS
R AXIS: 68 DEGREES
T AXIS: 25 DEGREES
VENTRICULAR RATE: 56 BPM

## 2024-12-02 RX ORDER — LISINOPRIL 20 MG/1
20 TABLET ORAL 2 TIMES DAILY
Status: DISCONTINUED | OUTPATIENT
Start: 2024-12-02 | End: 2024-12-05

## 2024-12-02 RX ORDER — CARVEDILOL 12.5 MG/1
25 TABLET ORAL 2 TIMES DAILY WITH MEALS
Status: DISCONTINUED | OUTPATIENT
Start: 2024-12-02 | End: 2024-12-05

## 2024-12-02 RX ORDER — ENOXAPARIN SODIUM 100 MG/ML
30 INJECTION SUBCUTANEOUS NIGHTLY
Status: DISCONTINUED | OUTPATIENT
Start: 2024-12-02 | End: 2024-12-05

## 2024-12-02 RX ORDER — MAGNESIUM OXIDE 400 MG/1
400 TABLET ORAL ONCE
Status: COMPLETED | OUTPATIENT
Start: 2024-12-02 | End: 2024-12-02

## 2024-12-02 RX ORDER — HYDRALAZINE HYDROCHLORIDE 20 MG/ML
10 INJECTION INTRAMUSCULAR; INTRAVENOUS EVERY 4 HOURS PRN
Status: DISCONTINUED | OUTPATIENT
Start: 2024-12-02 | End: 2024-12-05

## 2024-12-02 NOTE — HOME CARE LIAISON
Patient is current with [facility] for RN and PT services, Patient will need RONY orders at [facility]. Updated ERICKSON Camargo, added contact information to the AVS

## 2024-12-02 NOTE — PLAN OF CARE
Assumed care of patient at 19:30,   Alert and oriented x4,   Sinus rhythm with a 1st degree AVB, BBB, and occasional PVC's on tele.   Room air, lung sounds clear bilaterally, no cough noted. Continuous pulse oximetry maintained. Denies shortness of breath and dizziness.   Complaints of lower back pain. PRN pain med given with relief.   Continent of bowel and bladder.   Upper and lower dentures present at bedside.  Safety precautions maintained.    Discussed plan of care with patient. All questions answered.    01:18 Patient hypertensive, 179/62. Cardiology notified.    Problem: Patient/Family Goals  Goal: Patient/Family Long Term Goal  Description: Patient's Long Term Goal: Go home    Interventions:  - routine lab draws  - medication compliance   - MD rounding  - See additional Care Plan goals for specific interventions  Outcome: Progressing  Goal: Patient/Family Short Term Goal  Description: Patient's Short Term Goal: pain free    Interventions:   -heat/ice pack  -pain medication  -See additional Care Plan goals for specific interventions  Outcome: Progressing     Problem: CARDIOVASCULAR - ADULT  Goal: Maintains optimal cardiac output and hemodynamic stability  Description: INTERVENTIONS:  - Monitor vital signs, rhythm, and trends  - Monitor for bleeding, hypotension and signs of decreased cardiac output  - Evaluate effectiveness of vasoactive medications to optimize hemodynamic stability  - Monitor arterial and/or venous puncture sites for bleeding and/or hematoma  - Assess quality of pulses, skin color and temperature  - Assess for signs of decreased coronary artery perfusion - ex. Angina  - Evaluate fluid balance, assess for edema, trend weights  Outcome: Progressing  Goal: Absence of cardiac arrhythmias or at baseline  Description: INTERVENTIONS:  - Continuous cardiac monitoring, monitor vital signs, obtain 12 lead EKG if indicated  - Evaluate effectiveness of antiarrhythmic and heart rate control medications  as ordered  - Initiate emergency measures for life threatening arrhythmias  - Monitor electrolytes and administer replacement therapy as ordered  Outcome: Progressing

## 2024-12-02 NOTE — PHYSICAL THERAPY NOTE
PHYSICAL THERAPY EVALUATION - INPATIENT     Room Number: 8612/8612-A  Evaluation Date: 12/2/2024  Type of Evaluation: Initial  Physician Order: PT Eval and Treat    Presenting Problem: syncope, elevated troponin  Co-Morbidities : T12-L4 compression fractures, blind L eye, HTN, osteoporosis, RBBB, NSVT  Reason for Therapy: Mobility Dysfunction and Discharge Planning    PHYSICAL THERAPY ASSESSMENT   Patient is a 89 year old female admitted 11/30/2024 for syncope and elevated troponin.  Prior to admission, patient's baseline is supervision to min A for short distance ambulation with RW.  Patient is currently functioning near baseline with bed mobility, transfers, gait, maintaining seated position, and standing prolonged periods.  Patient is requiring minimal assist as a result of the following impairments: decreased functional strength, decreased endurance/aerobic capacity, pain, impaired   balance, and decreased muscular endurance.  Physical Therapy will continue to follow for duration of hospitalization.    Patient will benefit from continued skilled PT Services at discharge to promote prior level of function and safety with additional support and return home with home health PT.    PLAN DURING HOSPITALIZATION  Nursing Mobility Recommendation : 1 Assist  PT Device Recommendation: Rolling walker  PT Treatment Plan: Bed mobility;Body mechanics;Don/doff brace;Endurance;Energy conservation;Patient education;Family education;Gait training;Strengthening;Transfer training;Balance training  Rehab Potential : Fair  Frequency (Obs): 3-5x/week     CURRENT GOALS    Goal #1 Patient is able to demonstrate supine - sit EOB @ level: supervision     Goal #2 Patient is able to demonstrate transfers Sit to/from Stand at assistance level: supervision     Goal #3 Patient is able to ambulate 50 feet with assist device: walker - rolling at assistance level: supervision     Goal #4    Goal #5    Goal #6    Goal Comments: Goals established  on 12/2/2024      PHYSICAL THERAPY MEDICAL/SOCIAL HISTORY  History related to current admission: Patient is a 89 year old female admitted on 11/30/2024 from home  for syncope and elevated troponin.  Pt with recent T12-L4 compression fractures and family would like kyphoplasty for back pain.    Recent admission:   11/16-11/20/24 - compression fractures - rec [facility], went home with [facility]    HOME SITUATION  Type of Home: House  Home Layout: Two level;Able to live on main level  Stairs to Enter : 2        Stairs to Bedroom: 0         Lives With: Daughter    Drives: No   Patient Regularly Uses: Rolling walker;Wheelchair      Prior Level of [place]: Pt is typically mod ind with RW prior to last hospital admission. Since last hospital admission, pt has been requiring supervision to min A with RW for short distance ambulation and requiring assist for all ADLs. Pt current with [facility].     SUBJECTIVE  \"I walk?\"     OBJECTIVE  Precautions: Spine;TLSO;Bed/chair alarm;Low vision; needed;Hard of hearing  Fall Risk: High fall risk    WEIGHT BEARING RESTRICTION     PAIN ASSESSMENT  Rating: Unable to rate  Location: back  Management Techniques: Activity promotion;Body mechanics;Relaxation;Repositioning    COGNITION  Overall Cognitive Status:  WFL - within functional limits    RANGE OF MOTION AND STRENGTH ASSESSMENT  Upper extremity ROM and strength are within functional limits     Lower extremity ROM is within functional limits     Lower extremity strength: grossly weak/deconditioned    BALANCE  Static Sitting: Fair -  Dynamic Sitting: Poor +  Static Standing: Poor +  Dynamic Standing: Poor +    ADDITIONAL TESTS                                    ACTIVITY TOLERANCE                         O2 WALK       NEUROLOGICAL FINDINGS                        AM-PAC '6-Clicks' INPATIENT SHORT FORM - BASIC MOBILITY  How much difficulty does the patient currently have...  Patient Difficulty: Turning over in bed (including adjusting  bedclothes, sheets and blankets)?: A Little   Patient Difficulty: Sitting down on and standing up from a chair with arms (e.g., wheelchair, bedside commode, etc.): A Little   Patient Difficulty: Moving from lying on back to sitting on the side of the bed?: A Little   How much help from another person does the patient currently need...   Help from Another: Moving to and from a bed to a chair (including a wheelchair)?: A Little   Help from Another: Need to walk in hospital room?: A Little   Help from Another: Climbing 3-5 steps with a railing?: A Lot     AM-PAC Score:  Raw Score: 17   Approx Degree of Impairment: 50.57%   Standardized Score (AM-PAC Scale): 42.13   CMS Modifier (G-Code): CK    FUNCTIONAL ABILITY STATUS  Gait Assessment   Functional Mobility/Gait Assessment  Gait Assistance: Contact guard assist  Distance (ft): 12  Assistive Device: Rolling walker  Pattern: Shuffle    Skilled Therapy Provided: Per RN okay to work with pt. Pt received in supine and was agreeable to PT session.     Pt's dtr interpreting for session. Pt understood some English.     Bed Mobility:  Rolling: CGA via log roll   Supine to sit: min A via log roll to R side. Pt required supervision to min A for sitting balance and assist with donning TLSO  Sit to supine: NT     Transfer Mobility:  Sit to stand: CGA   Stand to sit: CGA  Gait = pt ambulated with RW and CGA     Therapist's Comments: Pt educated on role of therapy, goals for session, safety, fall prevention, spine precautions, TLSO, and activity recommendations.     Exercise/Education Provided:  Bed mobility  Body mechanics  Energy conservation  Functional activity tolerated  Gait training  Posture  Strengthening  Transfer training    Patient End of Session: Up in chair;Needs met;RN aware of session/findings;Call light within reach;All patient questions and concerns addressed;Hospital anti-slip socks;SCDs in place;Alarm set;Family present      Patient Evaluation Complexity  Level:  History Moderate - 1 or 2 personal factors and/or co-morbidities   Examination of body systems Low -  addressing 1-2 elements   Clinical Presentation Low- Stable   Clinical Decision Making Low Complexity       PT Session Time: 25 minutes  Therapeutic Activity: 8 minutes

## 2024-12-02 NOTE — PROGRESS NOTES
[facility] Cardiology  Progress Note    Vargas Power Patient Status:  Inpatient    6/15/1929 MRN [id number]   Location [facility] 8NE-A Attending Shad Kellogg,    Hosp Day # 2 PCP Kyler Washington MD       Tele: SR        Assessment:     Syncope                -Suspect vagal with back pain / strain for BM  -?Hypovolemia                -?Medication effect    -No arrhythmia on tele to date to account for episode   - TTE (24): LVEF 65-70%, RVSP wnl.  2.     +Troponin, mild HS 51.                -Minimal elevation.  Atypical \"flat\" pattern                -Similar reported with syncope in the past                -Declined ischemic assessment at [facility] time                -No ACS Sx                -No acute ischemic ECG changes  3.      HTN  4.      Sinus bradycardia on ECG  5.      1 AVB  6.       RBBB  7.       Recent vertebral compression Fx  8.       Anemia     Plan:    EcASA  orthostatics negative/rather, hypertensive--> coreg 12.5-->25mg bid, continue lisinopril 20mg bid  continue telemetry monitoring.  Dr. Guzman reviewed Troponin findings with daughter.  Discussed stress testing.  She declines.  Informed of risks of ischemia, infarct, and fatality - she accepts these risks and feels pt is too debilitated. Concur with this perspective.  10.   considering kyphoplasty for back pain relief      Subjective:  The patient denies any chest pain or dyspnea at this time.  LBP persists, about the same intensity.  tele- SR.  hypertensive.    Objective:  BP (!) 165/55 (BP Location: Left arm)   Pulse 78   Temp 97.6 °F (36.4 °C) (Oral)   Resp 16   Wt 95 lb (43.1 kg)   SpO2 98%   BMI 20.56 kg/m²   Temp (24hrs), Av.7 °F (36.5 °C), Min:97.5 °F (36.4 °C), Max:98 °F (36.7 °C)      Intake/Output Summary (Last 24 hours) at 2024 1515  Last data filed at 2024 1413  Gross per 24 hour   Intake 287 ml   Output 1150 ml   Net -863 ml     Wt Readings from Last 3 Encounters:   24 95 lb (43.1 kg)    11/16/24 95 lb (43.1 kg)   05/26/24 97 lb (44 kg)       General: Awake and alert; in no acute distress  Cardiac: Regular rate and regular rhythm; no murmurs/rubs/gallops are appreciated  Lungs: Clear to auscultation bilaterally; no accessory muscle use  Abdomen: Soft, non-tender; bowel sounds are normoactive  Extremities: No clubbing/cyanosis; moves all 4 extremities normally    Current Facility-Administered Medications   Medication Dose Route Frequency    lisinopril (Prinivil; Zestril) tab 20 mg  20 mg Oral BID    hydrALAzine (Apresoline) 20 mg/mL injection 10 mg  10 mg Intravenous Q4H PRN    aspirin DR tab 81 mg  81 mg Oral Daily    carvedilol (Coreg) tab 12.5 mg  12.5 mg Oral BID with meals    enoxaparin (Lovenox) 40 MG/0.4ML SUBQ injection 40 mg  40 mg Subcutaneous Nightly    acetaminophen (Tylenol Extra Strength) tab 500 mg  500 mg Oral Q4H PRN    fluticasone-salmeterol (Advair Diskus) 500-50 MCG/ACT inhaler 1 puff  1 puff Inhalation BID    pantoprazole (Protonix) DR tab 20 mg  20 mg Oral QAM AC       Laboratory Data:     Lab Results   Component Value Date    INR 0.9 04/25/2013    INR 0.99 03/03/2013     Lab Results   Component Value Date    K 4.5 12/01/2024    MG 1.6 12/02/2024       Telemetry: No malignant tachyarrhythmias or bradyarrhythmias      Thank you for allowing our practice to participate in the care of your patient. Please do not hesitate to contact me if you have any questions.    Arnulfo Lynn MD  12/2/2024  3:15 PM     normal

## 2024-12-02 NOTE — PROGRESS NOTES
CC: follow-up hospital admission syncope    SUBJECTIVE:  Interval History:     No acute issues overnight    OBJECTIVE:  Scheduled Meds:    lisinopril  20 mg Oral BID    carvedilol  25 mg Oral BID with meals    aspirin  81 mg Oral Daily    enoxaparin  40 mg Subcutaneous Nightly    fluticasone-salmeterol  1 puff Inhalation BID    pantoprazole  20 mg Oral QAM AC     Continuous Infusions:   PRN Meds:   hydrALAzine    acetaminophen    PHYSICAL EXAM  Vital signs: Temp:  [97.5 °F (36.4 °C)-98.2 °F (36.8 °C)] 98.2 °F (36.8 °C)  Pulse:  [62-85] 69  Resp:  [16-17] 17  BP: (128-189)/(50-67) 164/59  SpO2:  [94 %-100 %] 98 %      GENERAL - NAD, AAO  EYES- sclera anicteric,    HENT- normocephalic, OP - dry  NECK - no JVD  CV- RRR  RESP - CTAB, normal resp effort  ABDOMEN- soft, NT/ND   EXT- no LE edema strength symmetrical        Data Review:   Labs:   Recent Labs   Lab 11/30/24  1101 12/01/24  0515   WBC 6.1 5.9   HGB 10.8* 9.7*   MCV 76.9* 75.1*   .0 266.0       Recent Labs   Lab 11/30/24  1101 12/01/24  0515 12/01/24  1652 12/02/24  0512   * 137  --   --    K 4.0 3.5 4.5  --     104  --   --    CO2 27.0 25.0  --   --    BUN 18 17  --   --    CREATSERUM 0.76 0.72  --   --    CA 9.5 8.5*  --   --    MG  --  1.6  --  1.6   * 97  --   --        Recent Labs   Lab 11/30/24  1101 12/01/24  0515   ALT 28 22   AST 25 19   ALB 3.5 3.4       No results for input(s): \"PGLU\" in the last 168 hours.        ASSESSMENT/PLAN:    Patient is a 89 year old female with PMH sig for htn with labile bp, recent admission for back pain / compression fx (treated conservatively), here for syncope     Impression     -syncope  -trop elevation     -HTN  -TIA hx  -DM 2      Plan     -unclear cause of syncope. Per outpt notes has hx of labile htn, possibly related to orthostasis , drop in bp, vs vasovagal  -check echo - pending  -tele  -cards consult  -trop mildly up but not acs, appears has been elevated in the past        Back  pain  -recently admitted for this. L1 compression fx noted, however pt did not have pain in that level, kypho was no recommended. Appears her smyptoms to be more radicular and may trial steroids.   -x-ray l spine consistent with multiple compression fractures T12-L4.  -Reach out to IR to reevaluate for kyphpolasty      Tlso when out of bed given recent compression fx     Scds     Code  dw daughter POA, DNR    Will continue to follow while hospitalized. Please page me or the on-call hospitalist with questions or concerns. 36 minutes were spent with patient and coordinating care.     DO Giorgi [facility]  Hospitalist  Contact via [employer]/[place]/[employer]

## 2024-12-02 NOTE — OCCUPATIONAL THERAPY NOTE
OCCUPATIONAL THERAPY EVALUATION - INPATIENT    Room Number: 8612/8612-A  Evaluation Date: 12/2/2024     Type of Evaluation: Initial  Presenting Problem: syncope    Physician Order: IP Consult to Occupational Therapy  Reason for Therapy:  ADL/IADL Dysfunction and Discharge Planning    OCCUPATIONAL THERAPY ASSESSMENT   Patient is a 89 year old female admitted on 11/30/2024 with Presenting Problem: syncope.    Patient is currently functioning {OT/PT Functioning Baseline:17392} baseline with {OT ADLs:70810}.  Prior to admission, patient's baseline is ***.  Patient met all OT goals at *** level.  Patient reports no further questions/concerns at this time.     Patient will benefit from continued skilled OT Services {OT Services:55623}    Recommendations for nursing staff:   Transfers: ***  Toileting location: Toilet    EVALUATION SESSION:  Patient at start of session: ***    FUNCTIONAL TRANSFER ASSESSMENT     BED MOBILITY     BALANCE ASSESSMENT     FUNCTIONAL ADL ASSESSMENT     ACTIVITY TOLERANCE: ***                         O2 SATURATIONS       COGNITION  {Cognition:3840:::0}    COGNITION ASSESSMENTS     Upper Extremity:   ROM: within functional limits {OT UE ROM Exception to WFLs:5379:::0}  Strength: is within functional limits {OT UE Strength Exception to WFLs:4400:::0}  Coordination:  Gross motor: ***  Fine motor: ***  Sensation: {Sensation:3868:::0}    EDUCATION PROVIDED  Patient Education : Role of Occupational Therapy; Plan of Care  Patient's Response to Education: Verbalized Understanding; Returned Demonstration    Equipment used: ***  Demonstrates functional use    Therapist comments: ***    Patient End of Session: Up in chair;Needs met;Call light within reach;All patient questions and concerns addressed;Alarm set;Family present    OCCUPATIONAL PROFILE    HOME SITUATION                                      Prior Level of Function: ***    SUBJECTIVE  ***    PAIN ASSESSMENT             OBJECTIVE          WEIGHT  BEARING RESTRICTION       AM-PAC ‘6-Clicks’ Inpatient Daily Activity Short Form                      AM-PAC Score:             ADDITIONAL TESTS     NEUROLOGICAL FINDINGS      PLAN   Patient has been evaluated and presents with no skilled Occupational Therapy needs at this time.  Patient discharged from Occupational Therapy services.  Please re-order if a new functional limitation presents during this admission.         Patient Evaluation Complexity Level:   Occupational Profile/Medical History LOW - Brief history including review of medical or therapy records    Specific performance deficits impacting engagement in ADL/IADL LOW  1 - 3 performance deficits    Client Assessment/Performance Deficits LOW - No comorbidities nor modifications of tasks    Clinical Decision Making LOW - Analysis of occupational profile, problem-focused assessments, limited treatment options    Overall Complexity LOW     OT Session Time: 25 minutes  Self-Care Home Management: 5 minutes  Therapeutic Activity: 10 minutes  Neuromuscular Re-education: 0 minutes  Therapeutic Exercise: 0 minutes  Cognitive Skills: 0 minutes  Sensory Integrative: 0 minutes  Orthotic Management and Trainin minutes  Can add/delete any of these      Score:  Score: 15  Approx Degree of Impairment: 56.46%  Standardized Score (AM-PAC Scale): 34.69    ADDITIONAL TESTS     NEUROLOGICAL FINDINGS      PLAN   Patient has been evaluated and presents with no skilled Occupational Therapy needs at this time.  Patient discharged from Occupational Therapy services.  Please re-order if a new functional limitation presents during this admission.         Patient Evaluation Complexity Level:   Occupational Profile/Medical History LOW - Brief history including review of medical or therapy records    Specific performance deficits impacting engagement in ADL/IADL LOW  1 - 3 performance deficits    Client Assessment/Performance Deficits LOW - No comorbidities nor modifications of tasks    Clinical Decision Making LOW - Analysis of occupational profile, problem-focused assessments, limited treatment options    Overall Complexity LOW     OT Session Time: 25 minutes  Self-Care Home Management: 5 minutes  Therapeutic Activity: 10 minutes  Neuromuscular Re-education: 0 minutes  Therapeutic Exercise: 0 minutes  Cognitive Skills: 0 minutes  Sensory Integrative: 0 minutes  Orthotic Management and Trainin minutes  Can add/delete any of these

## 2024-12-02 NOTE — CM/SW NOTE
Pt is current w/ HC. RONY order entered.     [facility]  P:[phone]  F:[phone]    Mary Jo SINGH MSW, LSW  Discharge Planner

## 2024-12-02 NOTE — CM/SW NOTE
SW spoke with family at bedside to discuss discharge plan. Confirmed plans for HH. Family inquired on getting a hospital bed that the hospital provides for pt at discharge. Explained that those beds are only available in hospital like settings and they are a few thousand dollars to private purchase. ARMANDO encouraged family to contact E for any concerns regarding pt's bed at home. ARMANDO provided E contact info.     [facility]  P:[phone]  F:[phone]    Mary Jo SINGH MSW, LSW  Discharge Planner

## 2024-12-03 ENCOUNTER — APPOINTMENT (OUTPATIENT)
Dept: MRI IMAGING | Facility: HOSPITAL | Age: 89
End: 2024-12-03
Payer: MEDICARE

## 2024-12-03 LAB
ALBUMIN SERPL-MCNC: 3.6 G/DL (ref 3.2–4.8)
ANION GAP SERPL CALC-SCNC: 7 MMOL/L (ref 0–18)
BUN BLD-MCNC: 22 MG/DL (ref 9–23)
CALCIUM BLD-MCNC: 9.2 MG/DL (ref 8.7–10.4)
CHLORIDE SERPL-SCNC: 100 MMOL/L (ref 98–112)
CO2 SERPL-SCNC: 28 MMOL/L (ref 21–32)
CREAT BLD-MCNC: 0.8 MG/DL
EGFRCR SERPLBLD CKD-EPI 2021: 68 ML/MIN/1.73M2 (ref 60–?)
ERYTHROCYTE [DISTWIDTH] IN BLOOD BY AUTOMATED COUNT: 14.2 %
GLUCOSE BLD-MCNC: 91 MG/DL (ref 70–99)
HCT VFR BLD AUTO: 31.9 %
HGB BLD-MCNC: 10.6 G/DL
MAGNESIUM SERPL-MCNC: 1.7 MG/DL (ref 1.6–2.6)
MCH RBC QN AUTO: 24.8 PG (ref 26–34)
MCHC RBC AUTO-ENTMCNC: 33.2 G/DL (ref 31–37)
MCV RBC AUTO: 74.5 FL
OSMOLALITY SERPL CALC.SUM OF ELEC: 283 MOSM/KG (ref 275–295)
PHOSPHATE SERPL-MCNC: 3.7 MG/DL (ref 2.4–5.1)
PLATELET # BLD AUTO: 258 10(3)UL (ref 150–450)
POTASSIUM SERPL-SCNC: 3.8 MMOL/L (ref 3.5–5.1)
RBC # BLD AUTO: 4.28 X10(6)UL
SODIUM SERPL-SCNC: 135 MMOL/L (ref 136–145)
WBC # BLD AUTO: 4.5 X10(3) UL (ref 4–11)

## 2024-12-03 PROCEDURE — 72195 MRI PELVIS W/O DYE: CPT

## 2024-12-03 PROCEDURE — 72148 MRI LUMBAR SPINE W/O DYE: CPT

## 2024-12-03 RX ORDER — MAGNESIUM OXIDE 400 MG/1
400 TABLET ORAL ONCE
Status: COMPLETED | OUTPATIENT
Start: 2024-12-03 | End: 2024-12-03

## 2024-12-03 RX ORDER — AMLODIPINE BESYLATE 2.5 MG/1
2.5 TABLET ORAL EVERY EVENING
Status: DISCONTINUED | OUTPATIENT
Start: 2024-12-03 | End: 2024-12-05

## 2024-12-03 RX ORDER — HYDROMORPHONE HYDROCHLORIDE 1 MG/ML
0.3 INJECTION, SOLUTION INTRAMUSCULAR; INTRAVENOUS; SUBCUTANEOUS
Status: COMPLETED | OUTPATIENT
Start: 2024-12-03 | End: 2024-12-03

## 2024-12-03 RX ORDER — POTASSIUM CHLORIDE 1500 MG/1
40 TABLET, EXTENDED RELEASE ORAL ONCE
Status: COMPLETED | OUTPATIENT
Start: 2024-12-03 | End: 2024-12-03

## 2024-12-03 NOTE — PROGRESS NOTES
[facility] Cardiology  Progress Note    Vargas Power Patient Status:  Inpatient    6/15/1929 MRN [id number]   Location [facility] 8NE-A Attending Shad Kellogg,    Hosp Day # 3 PCP Kyler Washington MD       Tele: SR        Assessment:     Syncope                -Suspect vagal with back pain / strain for BM  -?Hypovolemia                -?Medication effect    -No arrhythmia on tele to date to account for episode   - TTE (24): LVEF 65-70%, RVSP wnl.  2.     +Troponin, mild HS 51.                -Minimal elevation.  Atypical \"flat\" pattern                -Similar reported with syncope in the past                -Declined ischemic assessment at [facility] time                -No ACS Sx                -No acute ischemic ECG changes  3.      HTN  4.      Sinus bradycardia on ECG  5.      1 AVB  6.       RBBB  7.       Recent vertebral compression Fx  8.       Anemia     Plan:    EcASA  orthostatics negative/rather, hypertensive (moreso evening/night). May be exacerbated by ongoing LBP (IR consulted for kyphoplasty)--> coreg 25mg bid, continue lisinopril 20mg bid. Add amlodipine 2.5mg qHS   minimal trop elevation: non-ACS related. Discussed stress testing.  She declines.  Informed of risks of ischemia, infarct, and fatality - she accepts these risks and feels pt is too debilitated. Concur with this perspective. continue telemetry monitoring.      Subjective:  The patient denies any chest pain or dyspnea at this time.  BP remains elevated.    Objective:  BP (!) 164/51 (BP Location: Left arm)   Pulse 71   Temp 98 °F (36.7 °C) (Oral)   Resp 18   Wt 95 lb (43.1 kg)   SpO2 99%   BMI 20.56 kg/m²   Temp (24hrs), Av °F (36.7 °C), Min:97.6 °F (36.4 °C), Max:98.2 °F (36.8 °C)      Intake/Output Summary (Last 24 hours) at 12/3/2024 1122  Last data filed at 12/3/2024 0500  Gross per 24 hour   Intake 200 ml   Output 300 ml   Net -100 ml     Wt Readings from Last 3 Encounters:   24 95 lb (43.1 kg)    11/16/24 95 lb (43.1 kg)   05/26/24 97 lb (44 kg)       General: Awake and alert; in no acute distress  Cardiac: Regular rate and regular rhythm; no murmurs/rubs/gallops are appreciated  Lungs: Clear to auscultation bilaterally; no accessory muscle use  Abdomen: Soft, non-tender; bowel sounds are normoactive  Extremities: No clubbing/cyanosis; moves all 4 extremities normally    Current Facility-Administered Medications   Medication Dose Route Frequency    lisinopril (Prinivil; Zestril) tab 20 mg  20 mg Oral BID    hydrALAzine (Apresoline) 20 mg/mL injection 10 mg  10 mg Intravenous Q4H PRN    carvedilol (Coreg) tab 25 mg  25 mg Oral BID with meals    enoxaparin (Lovenox) 30 MG/0.3ML SUBQ injection 30 mg  30 mg Subcutaneous Nightly    aspirin DR tab 81 mg  81 mg Oral Daily    acetaminophen (Tylenol Extra Strength) tab 500 mg  500 mg Oral Q4H PRN    fluticasone-salmeterol (Advair Diskus) 500-50 MCG/ACT inhaler 1 puff  1 puff Inhalation BID    pantoprazole (Protonix) DR tab 20 mg  20 mg Oral QAM AC       Laboratory Data:  Lab Results   Component Value Date    WBC 4.5 12/03/2024    HGB 10.6 12/03/2024    HCT 31.9 12/03/2024    .0 12/03/2024     Lab Results   Component Value Date    INR 0.9 04/25/2013    INR 0.99 03/03/2013     Lab Results   Component Value Date     12/03/2024    K 3.8 12/03/2024     12/03/2024    CO2 28.0 12/03/2024    BUN 22 12/03/2024    CREATSERUM 0.80 12/03/2024    GLU 91 12/03/2024    CA 9.2 12/03/2024    MG 1.7 12/03/2024       Telemetry: No malignant tachyarrhythmias or bradyarrhythmias      Thank you for allowing our practice to participate in the care of your patient. Please do not hesitate to contact me if you have any questions.    Arnulfo Lynn MD

## 2024-12-03 NOTE — PLAN OF CARE
Assumed care of patient 1930.    Patient alert and oriented x4. Limited english. On RA with sats above 95%. NSR on tele. Continent to bowel, incontinent to bladder; purewick in place. Pain to right hip and back, PRN tylenol and hotpacks offered and provided. Up x1 with walker, back brace when out of bed. Call light within reach. Fall precautions in place. Family at bedside. Will continue to monitor    Plan  Daily orthos  BP management  Kyphoplasty plans?    Problem: Patient/Family Goals  Goal: Patient/Family Long Term Goal  Description: Patient's Long Term Goal: Go home    Interventions:  - routine lab draws  - medication compliance   - MD rounding  - See additional Care Plan goals for specific interventions  Outcome: Progressing  Goal: Patient/Family Short Term Goal  Description: Patient's Short Term Goal: pain free    Interventions:   -heat/ice pack  -pain medication  -See additional Care Plan goals for specific interventions  Outcome: Progressing     Problem: CARDIOVASCULAR - ADULT  Goal: Maintains optimal cardiac output and hemodynamic stability  Description: INTERVENTIONS:  - Monitor vital signs, rhythm, and trends  - Monitor for bleeding, hypotension and signs of decreased cardiac output  - Evaluate effectiveness of vasoactive medications to optimize hemodynamic stability  - Monitor arterial and/or venous puncture sites for bleeding and/or hematoma  - Assess quality of pulses, skin color and temperature  - Assess for signs of decreased coronary artery perfusion - ex. Angina  - Evaluate fluid balance, assess for edema, trend weights  Outcome: Progressing  Goal: Absence of cardiac arrhythmias or at baseline  Description: INTERVENTIONS:  - Continuous cardiac monitoring, monitor vital signs, obtain 12 lead EKG if indicated  - Evaluate effectiveness of antiarrhythmic and heart rate control medications as ordered  - Initiate emergency measures for life threatening arrhythmias  - Monitor electrolytes and administer  replacement therapy as ordered  Outcome: Progressing

## 2024-12-03 NOTE — PLAN OF CARE
Patient AXOX4.On room air.NSR on tele.c/o pain to left hip,repositioned.Patient up in chair.TLSO brace on.Patient incontinent of bladder and continent of bowel.Female external cath intact and draining.Daughter at bedside.Plan of care of care updated,Call light within reach.Chair alarm on.  Problem: Patient/Family Goals  Goal: Patient/Family Long Term Goal  Description: Patient's Long Term Goal: Go home    Interventions:  - routine lab draws  - medication compliance   - MD rounding  - See additional Care Plan goals for specific interventions  Outcome: Progressing  Goal: Patient/Family Short Term Goal  Description: Patient's Short Term Goal: pain free    Interventions:   -heat/ice pack  -pain medication  -See additional Care Plan goals for specific interventions  Outcome: Progressing     Problem: CARDIOVASCULAR - ADULT  Goal: Maintains optimal cardiac output and hemodynamic stability  Description: INTERVENTIONS:  - Monitor vital signs, rhythm, and trends  - Monitor for bleeding, hypotension and signs of decreased cardiac output  - Evaluate effectiveness of vasoactive medications to optimize hemodynamic stability  - Monitor arterial and/or venous puncture sites for bleeding and/or hematoma  - Assess quality of pulses, skin color and temperature  - Assess for signs of decreased coronary artery perfusion - ex. Angina  - Evaluate fluid balance, assess for edema, trend weights  Outcome: Progressing  Goal: Absence of cardiac arrhythmias or at baseline  Description: INTERVENTIONS:  - Continuous cardiac monitoring, monitor vital signs, obtain 12 lead EKG if indicated  - Evaluate effectiveness of antiarrhythmic and heart rate control medications as ordered  - Initiate emergency measures for life threatening arrhythmias  - Monitor electrolytes and administer replacement therapy as ordered  Outcome: Progressing

## 2024-12-03 NOTE — PLAN OF CARE
Assumed care of patient @ 0730 patient resting in bed, AOX4, reports moderate back and left hip pain, prn medication provided. Lung sounds clear, but diminished bilaterally, O2 saturation adequate on room air. No complaints of shortness of breath or chest pain at this time. Sinus rhythm on tele. Bowel sounds present and active in all quadrants, last bowel movement today. Patient incontinent with external catheter in place. Blanchable redness to sacrum.     Plan of Care: Pain control. Kyphoplasty workup. BP control.     Discussed plan of care with patient, daughter at bedside, verbalized understanding. Call light within reach.     Problem: Patient/Family Goals  Goal: Patient/Family Long Term Goal  Description: Patient's Long Term Goal: Go home    Interventions:  - routine lab draws  - medication compliance   - MD rounding  - See additional Care Plan goals for specific interventions  Outcome: Progressing  Goal: Patient/Family Short Term Goal  Description: Patient's Short Term Goal: pain free    Interventions:   -heat/ice pack  -pain medication  -See additional Care Plan goals for specific interventions  Outcome: Progressing     Problem: CARDIOVASCULAR - ADULT  Goal: Maintains optimal cardiac output and hemodynamic stability  Description: INTERVENTIONS:  - Monitor vital signs, rhythm, and trends  - Monitor for bleeding, hypotension and signs of decreased cardiac output  - Evaluate effectiveness of vasoactive medications to optimize hemodynamic stability  - Monitor arterial and/or venous puncture sites for bleeding and/or hematoma  - Assess quality of pulses, skin color and temperature  - Assess for signs of decreased coronary artery perfusion - ex. Angina  - Evaluate fluid balance, assess for edema, trend weights  Outcome: Progressing  Goal: Absence of cardiac arrhythmias or at baseline  Description: INTERVENTIONS:  - Continuous cardiac monitoring, monitor vital signs, obtain 12 lead EKG if indicated  - Evaluate  effectiveness of antiarrhythmic and heart rate control medications as ordered  - Initiate emergency measures for life threatening arrhythmias  - Monitor electrolytes and administer replacement therapy as ordered  Outcome: Progressing

## 2024-12-03 NOTE — PROGRESS NOTES
CC: follow-up hospital admission syncope    SUBJECTIVE:  Interval History:     No acute issues overnight    OBJECTIVE:  Scheduled Meds:    amLODIPine  2.5 mg Oral QPM    lisinopril  20 mg Oral BID    carvedilol  25 mg Oral BID with meals    enoxaparin  30 mg Subcutaneous Nightly    aspirin  81 mg Oral Daily    fluticasone-salmeterol  1 puff Inhalation BID    pantoprazole  20 mg Oral QAM AC     Continuous Infusions:   PRN Meds:   HYDROmorphone    hydrALAzine    acetaminophen    PHYSICAL EXAM  Vital signs: Temp:  [98 °F (36.7 °C)-98.2 °F (36.8 °C)] 98 °F (36.7 °C)  Pulse:  [63-74] 71  Resp:  [18-21] 21  BP: (110-172)/(51-86) 148/51  SpO2:  [98 %-99 %] 98 %      GENERAL - NAD, AAO  EYES- sclera anicteric,    HENT- normocephalic, OP - dry  NECK - no JVD  CV- RRR  RESP - CTAB, normal resp effort  ABDOMEN- soft, NT/ND   EXT- no LE edema strength symmetrical        Data Review:   Labs:   Recent Labs   Lab 11/30/24  1101 12/01/24 0515 12/03/24  0522   WBC 6.1 5.9 4.5   HGB 10.8* 9.7* 10.6*   MCV 76.9* 75.1* 74.5*   .0 266.0 258.0       Recent Labs   Lab 11/30/24  1101 12/01/24  0515 12/01/24  1652 12/02/24  0512 12/03/24  0522   * 137  --   --  135*   K 4.0 3.5 4.5  --  3.8    104  --   --  100   CO2 27.0 25.0  --   --  28.0   BUN 18 17  --   --  22   CREATSERUM 0.76 0.72  --   --  0.80   CA 9.5 8.5*  --   --  9.2   MG  --  1.6  --  1.6 1.7   PHOS  --   --   --   --  3.7   * 97  --   --  91       Recent Labs   Lab 11/30/24  1101 12/01/24  0515 12/03/24  0522   ALT 28 22  --    AST 25 19  --    ALB 3.5 3.4 3.6       No results for input(s): \"PGLU\" in the last 168 hours.        ASSESSMENT/PLAN:    Patient is a 89 year old female with PMH sig for htn with labile bp, recent admission for back pain / compression fx (treated conservatively), here for syncope     Impression     -syncope  -trop elevation     -HTN  -TIA hx  -DM 2      Plan     -unclear cause of syncope. Per outpt notes has hx of labile  htn, possibly related to orthostasis , drop in bp, vs vasovagal  -check echo - pending  -tele  -cards consult  -trop mildly up but not acs, appears has been elevated in the past        Back pain  -recently admitted for this. L1 compression fx noted, however pt did not have pain in that level, kypho was no recommended. Appears her smyptoms to be more radicular and may trial steroids.   -x-ray l spine consistent with multiple compression fractures T12-L4.  -IR requesting MRI imaging of L-spine, ordered and pending.     Tlso when out of bed given recent compression fx     Scds     Code  dw daughter POA, DNR    Will continue to follow while hospitalized. Please page me or the on-call hospitalist with questions or concerns. 37 minutes were spent with patient and coordinating care.     DO Giorgi [place] [facility]  Hospitalist  Contact via [employer]

## 2024-12-03 NOTE — PROGRESS NOTES
12/03/24 0549 12/03/24 0553 12/03/24 0556   Vital Signs   BP (!) 164/62 (!) 172/86 (!) 167/55   MAP (mmHg) 89 (!) 112 87   BP Location Left arm Right arm Left arm   BP Method Automatic Automatic Automatic   Patient Position Lying Sitting Standing

## 2024-12-04 LAB
ALBUMIN SERPL-MCNC: 3.5 G/DL (ref 3.2–4.8)
ANION GAP SERPL CALC-SCNC: 7 MMOL/L (ref 0–18)
BUN BLD-MCNC: 22 MG/DL (ref 9–23)
CALCIUM BLD-MCNC: 9.5 MG/DL (ref 8.7–10.4)
CHLORIDE SERPL-SCNC: 101 MMOL/L (ref 98–112)
CO2 SERPL-SCNC: 27 MMOL/L (ref 21–32)
CREAT BLD-MCNC: 0.87 MG/DL
EGFRCR SERPLBLD CKD-EPI 2021: 61 ML/MIN/1.73M2 (ref 60–?)
ERYTHROCYTE [DISTWIDTH] IN BLOOD BY AUTOMATED COUNT: 14.3 %
GLUCOSE BLD-MCNC: 96 MG/DL (ref 70–99)
HCT VFR BLD AUTO: 31.1 %
HGB BLD-MCNC: 10.3 G/DL
MAGNESIUM SERPL-MCNC: 1.8 MG/DL (ref 1.6–2.6)
MCH RBC QN AUTO: 25.1 PG (ref 26–34)
MCHC RBC AUTO-ENTMCNC: 33.1 G/DL (ref 31–37)
MCV RBC AUTO: 75.7 FL
OSMOLALITY SERPL CALC.SUM OF ELEC: 283 MOSM/KG (ref 275–295)
PHOSPHATE SERPL-MCNC: 3.8 MG/DL (ref 2.4–5.1)
PLATELET # BLD AUTO: 223 10(3)UL (ref 150–450)
POTASSIUM SERPL-SCNC: 4.5 MMOL/L (ref 3.5–5.1)
POTASSIUM SERPL-SCNC: 4.5 MMOL/L (ref 3.5–5.1)
RBC # BLD AUTO: 4.11 X10(6)UL
SODIUM SERPL-SCNC: 135 MMOL/L (ref 136–145)
WBC # BLD AUTO: 4.8 X10(3) UL (ref 4–11)

## 2024-12-04 RX ORDER — MAGNESIUM OXIDE 400 MG/1
400 TABLET ORAL ONCE
Status: COMPLETED | OUTPATIENT
Start: 2024-12-04 | End: 2024-12-04

## 2024-12-04 NOTE — PLAN OF CARE
Pt is A&O x4. Up x1 walker w/ brace. Denies SOB & cardiac symptoms.  Maintaining O2 on RA. NSR w/ a 1st degree AV block, S1&S2 present.  Bowel sounds active. Incontinent of bladder. Brief and purewick in place.  Pt updated on plan of care. Bed in lowest position. Bed alarm on. Call light within reach.     Problem: CARDIOVASCULAR - ADULT  Goal: Maintains optimal cardiac output and hemodynamic stability  Description: INTERVENTIONS:  - Monitor vital signs, rhythm, and trends  - Monitor for bleeding, hypotension and signs of decreased cardiac output  - Evaluate effectiveness of vasoactive medications to optimize hemodynamic stability  - Monitor arterial and/or venous puncture sites for bleeding and/or hematoma  - Assess quality of pulses, skin color and temperature  - Assess for signs of decreased coronary artery perfusion - ex. Angina  - Evaluate fluid balance, assess for edema, trend weights  Outcome: Progressing  Goal: Absence of cardiac arrhythmias or at baseline  Description: INTERVENTIONS:  - Continuous cardiac monitoring, monitor vital signs, obtain 12 lead EKG if indicated  - Evaluate effectiveness of antiarrhythmic and heart rate control medications as ordered  - Initiate emergency measures for life threatening arrhythmias  - Monitor electrolytes and administer replacement therapy as ordered  Outcome: Progressing     Problem: Patient/Family Goals  Goal: Patient/Family Long Term Goal  Description: Patient's Long Term Goal: Go home    Interventions:  - routine lab draws  - medication compliance   - MD rounding  - See additional Care Plan goals for specific interventions  Outcome: Progressing  Goal: Patient/Family Short Term Goal  Description: Patient's Short Term Goal: pain free    Interventions:   -heat/ice pack  -pain medication  -See additional Care Plan goals for specific interventions  Outcome: Progressing

## 2024-12-04 NOTE — PROGRESS NOTES
[facility] Cardiology  Progress Note    Vargas Power Patient Status:  Inpatient    6/15/1929 MRN [id number]   Location [facility] 8NE-A Attending Shad Kellogg, DO   Hosp Day # 4 PCP Kyler Washington MD       Tele: SR        Assessment:     Syncope                -Suspect vagal with back pain / strain for BM  -?Hypovolemia                -?Medication effect    -No arrhythmia on tele to date to account for episode   - TTE (24): LVEF 65-70%, RVSP wnl.  2.     +Troponin, mild HS 51.                -Minimal elevation.  Atypical \"flat\" pattern                -Similar reported with syncope in the past                -Declined ischemic assessment at [facility] time                -No ACS Sx                -No acute ischemic ECG changes  3.      HTN  4.      Sinus bradycardia on ECG  5.      1 AVB  6.       RBBB  7.       Recent vertebral compression Fx  8.       Anemia     Plan:   BP: PM/overnight BP improved with low dose amlodipine 2.5mg at bedtime. coreg 25mg bid, lisinopril 20mg bid   minimal trop elevation: non-ACS related. Discussed stress testing with pt/family.  She declines.  Informed of risks of ischemia, infarct, and fatality - she accepts these risks and feels pt is too debilitated.   LBP: f/u MRI (L1 sub-acute compression fracture), IR consultation for potential kyphoplasty    Subjective:  The patient denies any chest pain or dyspnea at this time.  BP remains elevated.    Objective:  /45 (BP Location: Left arm)   Pulse 74   Temp 97.8 °F (36.6 °C) (Oral)   Resp 20   Wt 95 lb (43.1 kg)   SpO2 97%   BMI 20.56 kg/m²   Temp (24hrs), Av °F (36.7 °C), Min:97.8 °F (36.6 °C), Max:98.3 °F (36.8 °C)      Intake/Output Summary (Last 24 hours) at 2024 1049  Last data filed at 2024 0610  Gross per 24 hour   Intake 120 ml   Output 1275 ml   Net -1155 ml     Wt Readings from Last 3 Encounters:   24 95 lb (43.1 kg)   24 95 lb (43.1 kg)   24 97 lb (44 kg)       General:  Awake and alert; in no acute distress  Cardiac: Regular rate and regular rhythm; no murmurs/rubs/gallops are appreciated  Lungs: Clear to auscultation bilaterally; no accessory muscle use  Abdomen: Soft, non-tender; bowel sounds are normoactive  Extremities: No clubbing/cyanosis; moves all 4 extremities normally    Current Facility-Administered Medications   Medication Dose Route Frequency    lidocaine-menthol 4-1 % patch 1 patch  1 patch Transdermal Daily    amLODIPine (Norvasc) tab 2.5 mg  2.5 mg Oral QPM    lisinopril (Prinivil; Zestril) tab 20 mg  20 mg Oral BID    hydrALAzine (Apresoline) 20 mg/mL injection 10 mg  10 mg Intravenous Q4H PRN    carvedilol (Coreg) tab 25 mg  25 mg Oral BID with meals    enoxaparin (Lovenox) 30 MG/0.3ML SUBQ injection 30 mg  30 mg Subcutaneous Nightly    aspirin DR tab 81 mg  81 mg Oral Daily    acetaminophen (Tylenol Extra Strength) tab 500 mg  500 mg Oral Q4H PRN    fluticasone-salmeterol (Advair Diskus) 500-50 MCG/ACT inhaler 1 puff  1 puff Inhalation BID    pantoprazole (Protonix) DR tab 20 mg  20 mg Oral QAM AC       Laboratory Data:  Lab Results   Component Value Date    WBC 4.8 12/04/2024    HGB 10.3 12/04/2024    HCT 31.1 12/04/2024    .0 12/04/2024     Lab Results   Component Value Date    INR 0.9 04/25/2013    INR 0.99 03/03/2013     Lab Results   Component Value Date     12/04/2024    K 4.5 12/04/2024    K 4.5 12/04/2024     12/04/2024    CO2 27.0 12/04/2024    BUN 22 12/04/2024    CREATSERUM 0.87 12/04/2024    GLU 96 12/04/2024    CA 9.5 12/04/2024    MG 1.8 12/04/2024       Telemetry: No malignant tachyarrhythmias or bradyarrhythmias      Thank you for allowing our practice to participate in the care of your patient. Please do not hesitate to contact me if you have any questions.    Arnulfo Lynn MD

## 2024-12-04 NOTE — SPIRITUAL CARE NOTE
Spiritual Care Visit Note    Patient Name: Vargas Power Date of Spiritual Care Visit: 24   : 6/15/1929 Primary Dx: Syncope and collapse       Referred By:      Spiritual Care Taxonomy:    Intended Effects: Demonstrate caring and concern    Methods: Offer spiritual/Zoroastrianism support;Offer emotional support;Encourage self care    Interventions: Acknowledge current situation;Active listening;Ask guided questions;Explain  role;Identify supportive relationship(s);Provide hospitality    Visit Type/Summary:     - Spiritual Care: Consulted with RN prior to visit. Offered empathic listening and emotional support. Patient and family expressed appreciation for  visit. Provided information regarding how to contact Spiritual Care and left a Spiritual Care information card. Patient daughter was present and serving as primary care giver for patient. Daughter identified supportive relationships who will arrive soon.  remains available as needed for follow up.    Spiritual Care support can be requested via an Epic consult. For urgent/immediate needs, please contact the On Call  at: Edward: ext [phone]     Intern, Arnav Briseno MA

## 2024-12-04 NOTE — PLAN OF CARE
Assumed care from nocturnal RN. Patient is A&O x4 and was pleasant and cooperative throughout the shift. Daughter at bedside to help with translation. Patient is able to ambulate with stand by assist and a front wheel walker. Telemetry monitor reads NSR w/ 1st degree block and O2 is stable on room air. BP remained stable. Spinal brace in place w/ patient ambulation & transfers from bed to chair. Plan is for a kyphoplasty, date and time still undetermined. Education was provided on plan of care to patient and family at bedside. Patient safety was maintained. Call light and belongings are in reach.        Problem: Patient/Family Goals  Goal: Patient/Family Long Term Goal  Description: Patient's Long Term Goal: Go home    Interventions:  - routine lab draws  - medication compliance   - MD rounding  - See additional Care Plan goals for specific interventions  Outcome: Progressing  Goal: Patient/Family Short Term Goal  Description: Patient's Short Term Goal: pain free    Interventions:   -heat/ice pack  -pain medication  -See additional Care Plan goals for specific interventions  Outcome: Progressing     Problem: CARDIOVASCULAR - ADULT  Goal: Maintains optimal cardiac output and hemodynamic stability  Description: INTERVENTIONS:  - Monitor vital signs, rhythm, and trends  - Monitor for bleeding, hypotension and signs of decreased cardiac output  - Evaluate effectiveness of vasoactive medications to optimize hemodynamic stability  - Monitor arterial and/or venous puncture sites for bleeding and/or hematoma  - Assess quality of pulses, skin color and temperature  - Assess for signs of decreased coronary artery perfusion - ex. Angina  - Evaluate fluid balance, assess for edema, trend weights  Outcome: Progressing  Goal: Absence of cardiac arrhythmias or at baseline  Description: INTERVENTIONS:  - Continuous cardiac monitoring, monitor vital signs, obtain 12 lead EKG if indicated  - Evaluate effectiveness of antiarrhythmic  and heart rate control medications as ordered  - Initiate emergency measures for life threatening arrhythmias  - Monitor electrolytes and administer replacement therapy as ordered  Outcome: Progressing

## 2024-12-04 NOTE — PROGRESS NOTES
CC: follow-up hospital admission syncope    SUBJECTIVE:  Interval History:     No acute issues overnight    OBJECTIVE:  Scheduled Meds:    lidocaine-menthol  1 patch Transdermal Daily    amLODIPine  2.5 mg Oral QPM    lisinopril  20 mg Oral BID    carvedilol  25 mg Oral BID with meals    enoxaparin  30 mg Subcutaneous Nightly    aspirin  81 mg Oral Daily    fluticasone-salmeterol  1 puff Inhalation BID    pantoprazole  20 mg Oral QAM AC     Continuous Infusions:   PRN Meds:   hydrALAzine    acetaminophen    PHYSICAL EXAM  Vital signs: Temp:  [97.7 °F (36.5 °C)-98.3 °F (36.8 °C)] 97.7 °F (36.5 °C)  Pulse:  [58-74] 73  Resp:  [18-22] 19  BP: (110-160)/(43-63) 127/43  SpO2:  [97 %-99 %] 99 %      GENERAL - NAD, AAO  EYES- sclera anicteric,    HENT- normocephalic, OP - dry  NECK - no JVD  CV- RRR  RESP - CTAB, normal resp effort  ABDOMEN- soft, NT/ND   EXT- no LE edema strength symmetrical        Data Review:   Labs:   Recent Labs   Lab 11/30/24  1101 12/01/24  0515 12/03/24  0522 12/04/24  0535   WBC 6.1 5.9 4.5 4.8   HGB 10.8* 9.7* 10.6* 10.3*   MCV 76.9* 75.1* 74.5* 75.7*   .0 266.0 258.0 223.0       Recent Labs   Lab 11/30/24  1101 12/01/24  0515 12/01/24  1652 12/02/24  0512 12/03/24  0522 12/04/24  0535   * 137  --   --  135* 135*   K 4.0 3.5 4.5  --  3.8 4.5  4.5    104  --   --  100 101   CO2 27.0 25.0  --   --  28.0 27.0   BUN 18 17  --   --  22 22   CREATSERUM 0.76 0.72  --   --  0.80 0.87   CA 9.5 8.5*  --   --  9.2 9.5   MG  --  1.6  --  1.6 1.7 1.8   PHOS  --   --   --   --  3.7 3.8   * 97  --   --  91 96       Recent Labs   Lab 11/30/24  1101 12/01/24  0515 12/03/24  0522 12/04/24  0535   ALT 28 22  --   --    AST 25 19  --   --    ALB 3.5 3.4 3.6 3.5       No results for input(s): \"PGLU\" in the last 168 hours.        ASSESSMENT/PLAN:    Patient is a 89 year old female with PMH sig for htn with labile bp, recent admission for back pain / compression fx (treated conservatively),  here for syncope     Impression     -syncope  -trop elevation     -HTN  -TIA hx  -DM 2      Plan     -unclear cause of syncope. Per outpt notes has hx of labile htn, possibly related to orthostasis , drop in bp, vs vasovagal  -check echo - pending  -tele  -cards consult  -trop mildly up but not acs, appears has been elevated in the past        Back pain  -recently admitted for this. L1 compression fx noted, however pt did not have pain in that level, kypho was no recommended. Appears her smyptoms to be more radicular and may trial steroids.   -x-ray l spine consistent with multiple compression fractures T12-L4.  -Repeat MRI completed, shows L1 compression fracture.  Awaiting recommendations from IR for possible kyphoplasty, will also restart to neurosurgery and PM&R per family request for other possible recommendations.    Tlso when out of bed given recent compression fx     Scds     Code  dw daughter POA, DNR    Will continue to follow while hospitalized. Please page me or the on-call hospitalist with questions or concerns. 36 minutes were spent with patient and coordinating care.     DO Giorgi [place] [facility]  Hospitalist  Contact via [employer]

## 2024-12-05 ENCOUNTER — APPOINTMENT (OUTPATIENT)
Dept: GENERAL RADIOLOGY | Facility: HOSPITAL | Age: 89
End: 2024-12-05
Payer: MEDICARE

## 2024-12-05 VITALS
SYSTOLIC BLOOD PRESSURE: 148 MMHG | BODY MASS INDEX: 21 KG/M2 | OXYGEN SATURATION: 98 % | TEMPERATURE: 97 F | WEIGHT: 95 LBS | HEART RATE: 78 BPM | RESPIRATION RATE: 14 BRPM | DIASTOLIC BLOOD PRESSURE: 52 MMHG

## 2024-12-05 PROBLEM — R79.89 ELEVATED TROPONIN: Status: RESOLVED | Noted: 2017-04-05 | Resolved: 2024-12-05

## 2024-12-05 PROBLEM — D64.9 ANEMIA: Status: RESOLVED | Noted: 2024-11-30 | Resolved: 2024-12-05

## 2024-12-05 PROBLEM — R55 SYNCOPE AND COLLAPSE: Status: RESOLVED | Noted: 2017-04-05 | Resolved: 2024-12-05

## 2024-12-05 PROBLEM — E87.1 HYPONATREMIA: Status: RESOLVED | Noted: 2024-11-30 | Resolved: 2024-12-05

## 2024-12-05 LAB — MAGNESIUM SERPL-MCNC: 1.8 MG/DL (ref 1.6–2.6)

## 2024-12-05 PROCEDURE — 99221 1ST HOSP IP/OBS SF/LOW 40: CPT | Performed by: NEUROLOGICAL SURGERY

## 2024-12-05 PROCEDURE — 73502 X-RAY EXAM HIP UNI 2-3 VIEWS: CPT

## 2024-12-05 RX ORDER — AMLODIPINE BESYLATE 2.5 MG/1
2.5 TABLET ORAL EVERY EVENING
Qty: 30 TABLET | Refills: 1 | Status: SHIPPED | OUTPATIENT
Start: 2024-12-05

## 2024-12-05 RX ORDER — CALCITONIN SALMON 200 [IU]/.09ML
1 SPRAY, METERED NASAL DAILY
Status: SHIPPED | COMMUNITY
Start: 2024-12-05

## 2024-12-05 RX ORDER — MAGNESIUM OXIDE 400 MG/1
400 TABLET ORAL ONCE
Status: COMPLETED | OUTPATIENT
Start: 2024-12-05 | End: 2024-12-05

## 2024-12-05 NOTE — CONSULTS
[facility]  TORO Neurosurgery Consult    Vargas Power Patient Status:  Observation    6/15/1929 MRN [id number]   Location [facility] 8NE-A Attending Shad Kellogg, DO   Hosp Day # 5 PCP Kyler Washington MD     REASON FOR CONSULTATION:  Syncope and collapse    HISTORY OF PRESENT ILLNESS     Vargas Power is a 89 year old female with PMH of HTN, NSVT, AVB, prior syncope, and recent L1 compression fracture who presented to ED on 24 following a syncopal event of unknown etiology. Patient reportedly lost consciousness for less than 1 minute while daughter was assisting her out of bed. There was no fall nor head injury. She was found to have minimally elevated troponin in ED and has undergone a cardiac workup which has thus far been unremarkable. Pt was recently admitted 11/15- for acute L1 compression fx. She was evaluated by our service and  conservative treatment with bracing and medication therapy was advised. She was seen by Pain Management while inpatient but family declined kyphoplasty at that time. MRI lumbar spine was repeated in setting on ongoing back pain this admission; shows multilevel advanced DDD/DJD and stability of subacute L1 compression fx. Neurosurgery has been re-consulted for review.     Daughter at bedside assists with translation. On exam, pt reports left hip pain. She has no back pain nor radiating leg pain. She has no numbness, tingling, or weakness of lower extremities. No changes in bowel/bladder habits. She is ambulatory with a walker and has been wearing TLSO brace when OOB.      PAST MEDICAL HISTORY     Past Medical History:    Arrhythmia    palpitations    Asthma    Back problem    Benign neoplasm of kidney, except pelvis    no change  to     Blind    Chest pain, unspecified     stress echo neg    Cholelithiasis    asymptomatic    Esophageal reflux    : EGD    Hearing impaired person, bilateral    Helicobacter pylori (H. pylori)    treated    HTN     chuy wnl 9/08    Irritable bowel syndrome    longstanding chronic abdominal pain    Muscle weakness    Osteoporosis    fosamax [id number] (SE)    Other and unspecified ovarian cyst    complex cyst (Dr. Cid)    Urge incontinence    Visual impairment    Vitamin D deficiency     PAST SURGICAL HISTORY:  Past Surgical History:   Procedure Laterality Date    Colonoscopy,diagnostic  12/01    wnl    Injection, w/wo contrast, dx/therapeutic substance, epidural/subarachnoid; lumbar/sacral N/A 1/29/2016    Procedure: CAUDAL;  Surgeon: Daniel Mclean MD;  Location: [facility] FOR PAIN MANAGEMENT    Patient documented not to have experienced any of the following events N/A 1/29/2016    Procedure: CAUDAL;  Surgeon: Daniel Mclean MD;  Location: [facility] PAIN MANAGEMENT    Patient withough preoperative order for iv antibiotic surgical site infection prophylaxis. N/A 1/29/2016    Procedure: CAUDAL;  Surgeon: Daniel Mclean MD;  Location: [facility] PAIN [facility]    Upper gi endoscopy,diagnosis  12/01    wnl     FAMILY HISTORY:  family history includes Cancer in her sister; Heart Disorder in her mother.    SOCIAL HISTORY:   reports that she has never smoked. She has never used smokeless tobacco. She reports that she does not drink alcohol and does not use drugs.    ALLERGIES     Allergies[1]    MEDICATIONS     Prescriptions Prior to Admission[2]  Current Facility-Administered Medications   Medication Dose Route Frequency    lidocaine-menthol 4-1 % patch 1 patch  1 patch Transdermal Daily    amLODIPine (Norvasc) tab 2.5 mg  2.5 mg Oral QPM    lisinopril (Prinivil; Zestril) tab 20 mg  20 mg Oral BID    hydrALAzine (Apresoline) 20 mg/mL injection 10 mg  10 mg Intravenous Q4H PRN    carvedilol (Coreg) tab 25 mg  25 mg Oral BID with meals    enoxaparin (Lovenox) 30 MG/0.3ML SUBQ injection 30 mg  30 mg Subcutaneous Nightly    aspirin DR tab 81 mg  81 mg Oral Daily    acetaminophen (Tylenol Extra Strength) tab 500  mg  500 mg Oral Q4H PRN    fluticasone-salmeterol (Advair Diskus) 500-50 MCG/ACT inhaler 1 puff  1 puff Inhalation BID    pantoprazole (Protonix) DR tab 20 mg  20 mg Oral QAM AC     REVIEW OF SYSTEMS     Comprehensive Review of Systems obtained, and is negative other than that mentioned in the History of Present Illness.      PHYSICAL EXAMINATION     VITALS: /45 (BP Location: Right arm)   Pulse 68   Temp 97.6 °F (36.4 °C) (Oral)   Resp 16   Wt 95 lb (43.1 kg)   SpO2 98%   BMI 20.56 kg/m²     GENERAL:  No acute distress, non-toxic appearing, speech fluent, mood appropriate    HEENT:  Normocephalic, atraumatic    RESP: Non-labored, easy, even    CV: NSR on tele    MUSCULOSKELETAL: Moves all extremities freely. No point tenderness to palpation of lumbar spine. Tender to palpation of left lateral hip.     NEUROLOGICAL:  Alert and oriented x3.  Speech fluent. Comprehension intact.  Sensation to light touch is intact bilaterally.  No arm or leg weakness noted; strength 5/5 bilaterally. Gait deferred.       DIAGNOSTIC DATA     Lab Results   Component Value Date    MG 1.8 12/05/2024     IMAGING     MRI LUMBAR SPINE+SACRUM+COCCYX  (CPT=72148/46874)    Result Date: 12/3/2024  CONCLUSION:  Stable appearance of the lumbar spine with subacute compression fracture of the L1 vertebral body with persistent edema.   LOCATION:  [facility]   Dictated by (CST): Randy Hopper MD on 12/03/2024 at 10:27 PM     Finalized by (CST): Randy Hopper MD on 12/03/2024 at 10:40 PM          ASSESSMENT & PLAN     ASSESSMENT:  Syncope  Elevated troponin  Left hip pain  Subacute compression fracture of L1  Chronic compression fractures of T12, L2, L3, L4, T12    PLAN:  No acute surgical intervention is indicated  Discussed with daughter at bedside and granddaughter (Chioma) via phone that conservative management is again recommended, in setting of patient's advanced age and comorbidities. She currently has no back pain nor leg symptoms and  fracture morphology is stable, therefore kyphoplasty likely not of benefit at this time.  Recommend imaging of left hip in setting of pt's currently complaints - discussed with hospitalist, Dr. Kellogg   Medical management and pain control per hospitalist  Encourage OOB, PT/OT  TLSO as needed for comfort   Consider pain management evaluation for consideration of hip injection pending imaging findings - I have reached out to Dr. Juve Gilbert to follow. Thank you very much for the consult.     Rossana Ellis, APRN-NP  [facility]  2024 10:00 AM     Total visit time: 30 minutes; More than 50% spent coordinating care, counseling, reviewing imaging and discussing medication therapy.     Is this a shared or split note between Advanced Practice Provider and Physician? Yes  Patient seen examined with PA  Case discussed  89-year-old female with L1 fracture  She is denying any back pain  She has had pain in her lateral hip on the left  Discussion through daughter who is interpreting  They can work her up for hip issues  Daughter asked if there is some fusion available  Given her age and bone quality I do not think that is very reasonable  She would need further workup to delineate which nerve is actually bothering her as it is just lateral hip  Will follow peripherally       [1] No Active Allergies  [2]   Medications Prior to Admission   Medication Sig Dispense Refill Last Dose/Taking    [] calcitonin, salmon, 200 UNIT/ACT Nasal Solution 1 spray by Nasal route daily for 14 days. 14 each 0 2024 at  9:00 AM    ASPIRIN 81 MG Oral Chew Tab CHEW AND SWALLOW 1 TABLET(81 MG) BY MOUTH DAILY 90 tablet 0 2024 at  9:00 PM    carvedilol 25 MG Oral Tab Take 1 tablet (25 mg total) by mouth 2 (two) times daily with meals. 180 tablet 3 2024 at  9:00 AM    lisinopril 10 MG Oral Tab Take 1 tablet (10 mg total) by mouth 2 (two) times daily. 180 tablet 3 2024 at  9:00 AM     Cholecalciferol 2000 units Oral Cap Take 2,000 Units by mouth daily.   11/29/2024 Noon    Cyanocobalamin (B-12) 100 MCG Oral Tab Take 100 mcg by mouth daily.   11/29/2024 Noon    lidocaine-menthol 4-1 % External Patch Place 2 patches onto the skin daily. 30 patch 0 Unknown    OMEPRAZOLE 20 MG Oral Capsule Delayed Release TAKE 1 CAPSULE(20 MG) BY MOUTH EVERY MORNING BEFORE BREAKFAST 90 capsule 0 Unknown    triamcinolone 0.1 % External Ointment APPLY TO THE AFFECTED AREA ON THE BODY TWICE DAILY 454 g 0 Unknown    ONETOUCH ULTRA In Vitro Strip TEST BLOOD SUGAR ONCE DAILY 100 strip 0     Lancets (ONETOUCH DELICA PLUS [id number]) Does not apply Misc 1 each by Other route daily. 100 each 1     ADVAIR DISKUS 500-50 MCG/DOSE Inhalation Aerosol Powder, Breath Activated INHALE 1 PUFF INTO THE LUNGS TWICE DAILY 180 each 0 Unknown    tiZANidine HCl 2 MG Oral Cap Take 1 capsule (2 mg total) by mouth 3 (three) times daily as needed for Muscle spasms. 30 capsule 0 Unknown    hydrOXYzine HCl 25 MG Oral Tab Take 1 tab PO 30 minutes before going to bed 30 tablet 2 Unknown

## 2024-12-05 NOTE — PHYSICAL THERAPY NOTE
PHYSICAL THERAPY TREATMENT NOTE - INPATIENT    Room Number: 8612/8612-A     Session: 1     Number of Visits to Meet Established Goals: 4    Presenting Problem: syncope, elevated troponin  Co-Morbidities : T12-L4 compression fractures, blind L eye, HTN, osteoporosis, RBBB, NSVT    PHYSICAL THERAPY MEDICAL/SOCIAL HISTORY  History related to current admission: Patient is a 89 year old female admitted on 11/30/2024 from home  for syncope and elevated troponin.  Pt with recent T12-L4 compression fractures and family would like kyphoplasty for back pain.     Recent admission:   11/16-11/20/24 - compression fractures - rec [facility], went home with [facility]     HOME SITUATION  Type of Home: House  Home Layout: Two level;Able to live on main level  Stairs to Enter : 2        Stairs to Bedroom: 0         Lives With: Daughter    Drives: No   Patient Regularly Uses: Rolling walker;Wheelchair       Prior Level of [place]: Pt is typically mod ind with RW prior to last hospital admission. Since last hospital admission, pt has been requiring supervision to min A with RW for short distance ambulation and requiring assist for all ADLs. Pt current with [facility].     PHYSICAL THERAPY ASSESSMENT   Patient demonstrates excellent progress this session, goals  remain in progress.      Pt still pending possible kyphoplasty - awaiting recent images to be reviewed/POC  Pt with improved pain management with TLSO in place.     Patient is requiring supervision as a result of the following impairments: pain and decreased muscular endurance.     Patient continues to function below baseline with bed mobility, transfers, and gait.  Next session anticipate patient to progress bed mobility, transfers, and gait.  Physical Therapy will continue to follow patient for duration of hospitalization.    Patient continues to benefit from continued skilled PT services: at discharge to promote prior level of function and safety with additional support and return home  with home health PT.    PLAN DURING HOSPITALIZATION  Nursing Mobility Recommendation : 1 Assist  PT Device Recommendation: Rolling walker  PT Treatment Plan: Bed mobility;Body mechanics;Don/doff brace;Endurance;Energy conservation;Patient education;Family education;Gait training;Strengthening;Transfer training;Balance training  Frequency (Obs): 3-5x/week     CURRENT GOALS     Goal #1 Patient is able to demonstrate supine - sit EOB @ level: supervision      Goal #2 Patient is able to demonstrate transfers Sit to/from Stand at assistance level: supervision      Goal #3 Patient is able to ambulate 50 feet with assist device: walker - rolling at assistance level: supervision      Goal #4     Goal #5     Goal #6     Goal Comments: Goals established on 12/2/2024 12/5/2024 all goals ongoing     SUBJECTIVE  \"Yes ok\"    OBJECTIVE  Precautions: Spine;TLSO;Bed/chair alarm;Low vision; needed;Hard of hearing    WEIGHT BEARING RESTRICTION     PAIN ASSESSMENT   Rating: Unable to rate  Location: back  Management Techniques: Activity promotion;Body mechanics;Relaxation;Repositioning    BALANCE                                                                                                                       Static Sitting: Fair -  Dynamic Sitting: Poor +           Static Standing: Poor +  Dynamic Standing: Poor +    ACTIVITY TOLERANCE                         O2 WALK       AM-PAC '6-Clicks' INPATIENT SHORT FORM - BASIC MOBILITY  How much difficulty does the patient currently have...  Patient Difficulty: Turning over in bed (including adjusting bedclothes, sheets and blankets)?: A Little   Patient Difficulty: Sitting down on and standing up from a chair with arms (e.g., wheelchair, bedside commode, etc.): A Little   Patient Difficulty: Moving from lying on back to sitting on the side of the bed?: A Little   How much help from another person does the patient currently need...   Help from Another: Moving to and from a  bed to a chair (including a wheelchair)?: A Little   Help from Another: Need to walk in hospital room?: A Little   Help from Another: Climbing 3-5 steps with a railing?: A Lot     AM-PAC Score:  Raw Score: 17   Approx Degree of Impairment: 50.57%   Standardized Score (AM-PAC Scale): 42.13   CMS Modifier (G-Code): CK    FUNCTIONAL ABILITY STATUS  Gait Assessment   Functional Mobility/Gait Assessment  Gait Assistance: Supervision  Distance (ft): 150  Assistive Device: Rolling walker (TLSO in place)  Pattern: Shuffle    Skilled Therapy Provided    Bed Mobility:  Rolling: NT   Supine<>Sit: NT   Sit<>Supine: NT     Transfer Mobility:  Sit<>Stand: SBA   Stand<>Sit: SBA   Gait: SBA with RW    Therapist's Comments: Pt already in chair upon writer arrival - Pt's daughter eager to increase mobility.  Discussed importance of multiple bouts of ambulation and frequent change of position for spine health.       THERAPEUTIC EXERCISES  Lower Extremity Ankle pumps     Upper Extremity  - open/close     Position Sitting     Repetitions   10   Sets   1     Patient End of Session: Up in chair;Needs met;Call light within reach;RN aware of session/findings;All patient questions and concerns addressed;Bracing education provided per handout;Hospital anti-slip socks;Family present    PT Session Time: 25 minutes  Gait Training: 15 minutes  Therapeutic Activity: 8 minutes  Therapeutic Exercise: 0 minutes   Neuromuscular Re-education: 0 minutes

## 2024-12-05 NOTE — CM/SW NOTE
12/02/24 1600   Discharge disposition   Expected discharge disposition Home-Health   Post Acute Care Provider Residential     Order written for  PT,OT RN khoi.  Pt is current with [facility] and resume of care orders already in place.  PT to issue walker for home. Granddaughter states walker at home is too small for her ( probably pediatric walker- per PT need Oziel Walker _    Family advised to go to \[facility]\"" Pharmacy to inquire about Oziel walker if needed- they will also check there for Raised toilet seat with rails.      [facility] aware of dc for today. Family requests [facility] for dc home . [facility] arranged for 6pm dc.      Angeline Mayo LCSW  /Discharge Planner

## 2024-12-05 NOTE — PLAN OF CARE
Pt is A&O x4. Up x1 walker w/ brace. Pain manages w/ PRN medication and heat packs.  Maintaining O2 on RA. NSR w/ a 1st degree AV block, S1&S2 present. Denies SOB & cardiac symptoms.  Bowel sounds active. Incontinent of bladder. Brief and purewick in place.  Pt updated on plan of care. Bed in lowest position. Bed alarm on. Call light within reach.      Problem: CARDIOVASCULAR - ADULT  Goal: Maintains optimal cardiac output and hemodynamic stability  Description: INTERVENTIONS:  - Monitor vital signs, rhythm, and trends  - Monitor for bleeding, hypotension and signs of decreased cardiac output  - Evaluate effectiveness of vasoactive medications to optimize hemodynamic stability  - Monitor arterial and/or venous puncture sites for bleeding and/or hematoma  - Assess quality of pulses, skin color and temperature  - Assess for signs of decreased coronary artery perfusion - ex. Angina  - Evaluate fluid balance, assess for edema, trend weights  Outcome: Progressing  Goal: Absence of cardiac arrhythmias or at baseline  Description: INTERVENTIONS:  - Continuous cardiac monitoring, monitor vital signs, obtain 12 lead EKG if indicated  - Evaluate effectiveness of antiarrhythmic and heart rate control medications as ordered  - Initiate emergency measures for life threatening arrhythmias  - Monitor electrolytes and administer replacement therapy as ordered  Outcome: Progressing    Problem: Patient/Family Goals  Goal: Patient/Family Long Term Goal  Description: Patient's Long Term Goal: Go home    Interventions:  - routine lab draws  - medication compliance   - MD rounding  - See additional Care Plan goals for specific interventions  Outcome: Progressing  Goal: Patient/Family Short Term Goal  Description: Patient's Short Term Goal: pain free    Interventions:   -heat/ice pack  -pain medication  -See additional Care Plan goals for specific interventions  Outcome: Progressing

## 2024-12-05 NOTE — PROGRESS NOTES
[facility] Cardiology  Progress Note    Vargas Power Patient Status:  Inpatient    6/15/1929 MRN [id number]   Location [facility] 8NE-A Attending Shad Kellogg,    Hosp Day # 5 PCP Kyler Washington MD       Tele: SR        Assessment:     Syncope                -Suspect vagal with back pain / strain for BM  -?Hypovolemia                -?Medication effect    -No arrhythmia on tele to date to account for episode   - TTE (24): LVEF 65-70%, RVSP wnl.  2.     +Troponin, mild HS 51.                -Minimal elevation.  Atypical \"flat\" pattern                -Similar reported with syncope in the past                -Declined ischemic assessment at [facility] time                -No ACS Sx                -No acute ischemic ECG changes  3.      HTN  4.      Sinus bradycardia on ECG  5.      1 AVB  6.       RBBB  7.       Recent vertebral compression Fx  8.       Anemia     Plan:   BP: overall improved, seems to trend up with BP.  continue amlodipine 2.5mg at bedtime. coreg 25mg bid, lisinopril 20mg bid   minimal trop elevation: non-ACS related. Discussed stress testing with pt/family.  She declines.  Informed of risks of ischemia, infarct, and fatality - she accepts these risks and feels pt is too debilitated.   LBP: f/u MRI (L1 sub-acute compression fracture), IR/NS/PM&R consulted.     Subjective:  The patient denies any chest pain or dyspnea at this time.  No recurent syncope.    Objective:  /45 (BP Location: Right arm)   Pulse 68   Temp 97.6 °F (36.4 °C) (Oral)   Resp 16   Wt 95 lb (43.1 kg)   SpO2 98%   BMI 20.56 kg/m²   Temp (24hrs), Av.7 °F (36.5 °C), Min:97.5 °F (36.4 °C), Max:98 °F (36.7 °C)      Intake/Output Summary (Last 24 hours) at 2024 1056  Last data filed at 2024 0845  Gross per 24 hour   Intake 240 ml   Output 550 ml   Net -310 ml     Wt Readings from Last 3 Encounters:   24 95 lb (43.1 kg)   24 95 lb (43.1 kg)   24 97 lb (44 kg)       General:  Awake and alert; in no acute distress  Cardiac: Regular rate and regular rhythm; no murmurs/rubs/gallops are appreciated  Lungs: Clear to auscultation bilaterally; no accessory muscle use  Abdomen: Soft, non-tender; bowel sounds are normoactive  Extremities: No clubbing/cyanosis; moves all 4 extremities normally    Current Facility-Administered Medications   Medication Dose Route Frequency    lidocaine-menthol 4-1 % patch 1 patch  1 patch Transdermal Daily    amLODIPine (Norvasc) tab 2.5 mg  2.5 mg Oral QPM    lisinopril (Prinivil; Zestril) tab 20 mg  20 mg Oral BID    hydrALAzine (Apresoline) 20 mg/mL injection 10 mg  10 mg Intravenous Q4H PRN    carvedilol (Coreg) tab 25 mg  25 mg Oral BID with meals    enoxaparin (Lovenox) 30 MG/0.3ML SUBQ injection 30 mg  30 mg Subcutaneous Nightly    aspirin DR tab 81 mg  81 mg Oral Daily    acetaminophen (Tylenol Extra Strength) tab 500 mg  500 mg Oral Q4H PRN    fluticasone-salmeterol (Advair Diskus) 500-50 MCG/ACT inhaler 1 puff  1 puff Inhalation BID    pantoprazole (Protonix) DR tab 20 mg  20 mg Oral QAM AC       Laboratory Data:       Lab Results   Component Value Date    INR 0.9 04/25/2013    INR 0.99 03/03/2013     Lab Results   Component Value Date    MG 1.8 12/05/2024       Telemetry: No malignant tachyarrhythmias or bradyarrhythmias      Thank you for allowing our practice to participate in the care of your patient. Please do not hesitate to contact me if you have any questions.    Arnulfo Lynn MD

## 2024-12-05 NOTE — CM/SW NOTE
This is a Code 44. Patient failed inpatient criteria. Second level of review completed and supports observation.  UR committee in agreement. Discussed with Dr. OCASIO  who approves observation status.  Observation order placed. EDW UR RN ON SITE NOTIFIED TO GIVE BROWNING & to have the patient sign & copy placed in their chart.

## 2024-12-05 NOTE — DISCHARGE SUMMARY
General Medicine Discharge Summary     Patient ID:  Vargas Power  89 year old  6/15/1929    Admit date: 11/30/2024    Discharge date and time: 12/5/2024    Attending Physician: Shad Kellogg DO     Consults: IP CONSULT TO HOSPITALIST  IP CONSULT TO CARDIOLOGY  IP CONSULT TO SOCIAL WORK  IP CONSULT TO SOCIAL WORK  IP CONSULT TO SOCIAL WORK  IP CONSULT TO SOCIAL WORK  IP CONSULT TO NEUROSURGERY  IP CONSULT TO SOCIAL WORK    Primary Care Physician: Kyler Washington MD     Reason for admission: Syncope    Risk For Readmission: Low    Discharge Diagnoses: Syncope and collapse [R55]  Elevated troponin [R79.89]  See Additional Discharge Diagnoses in Hospital Course    Discharged Condition: fair    Follow-up with labs/images appointments: Pain management, PCP    Exam  Gen: No acute distress  Pulm: Lungs clear, normal respiratory effort  CV: Heart with regular rate and rhythm  Abd: Abdomen soft,   EXT: no edema     Hospital Course: 89-year-old female with history of hypertension w/ labile blood pressure, recent for back pain/compression fracture treated conservatively, who presented to the hospital for syncope.  Patient has also been having left hip pain along with back pain for last several weeks which has been bothering her. She was admitted to the hospital for further workup, CT head negative.  Patient did have mild troponin elevation of 51 with no subsequent rise.  Echo completed on 12/1 showed normal EF 65-70% with no valvular or wall motion abnormalities.  Cardiology was consulted, no further cardiac workup was warranted as patient did not have any other ACS symptoms, they also suspected that syncope was likely in the setting of pain and hypovolemia.  Patient received IV fluids. Additionally, cardiology added amlodipine 2.5 mg daily at bedtime for adequate BP control.      *BP meds on discharge - amlodipine 2.5 mg bedtime, coreg 25 mg BID, lisinopril 20 mg BID    Regarding back pain, patient did have compression fracture  however patient's pain seems to be more in the left hip region inferior to the ASIS joint.  X-ray of the left hip was negative for fracture, did show osteoarthritis.  Suspect that this is likely musculoskeletal in nature.  Neurosurgery was consulted for compression fracture and to see if there is possible radicular component of pain, they deemed that the pain is likely separate and that kyphoplasty would not benefit the patient.  Patient's pain did improve during hospitalization, responded well to as needed Tylenol.  Patient is to follow-up with pain management in the outpatient setting for any potential injections/blocks if pain in the left hip persists.  Otherwise stable for discharge at this time, will be going home with home health care.      Operative Procedures:      Imaging: XR HIP W OR WO PELVIS 2 OR 3 VIEWS, LEFT (CPT=73502)    Result Date: 12/5/2024  CONCLUSION:  Scoliotic and degenerative changes.  As above.   LOCATION:  [facility]   Dictated by (CST): Gianni Gallagher MD on 12/05/2024 at 1:08 PM     Finalized by (CST): Gianni Gallagher MD on 12/05/2024 at 1:10 PM       MRI LUMBAR SPINE+SACRUM+COCCYX  (CPT=72148/66974)    Result Date: 12/3/2024  CONCLUSION:  Stable appearance of the lumbar spine with subacute compression fracture of the L1 vertebral body with persistent edema.   LOCATION:  [facility]   Dictated by (CST): Randy Hopper MD on 12/03/2024 at 10:27 PM     Finalized by (CST): Randy Hopper MD on 12/03/2024 at 10:40 PM          Disposition: home    Activity: activity as tolerated  Diet: regular diet      Home Medication Changes:     Med list     Medication List        START taking these medications      amLODIPine 2.5 MG Tabs  Commonly known as: Norvasc  Take 1 tablet (2.5 mg total) by mouth every evening.            CONTINUE taking these medications      Advair Diskus 500-50 MCG/DOSE Aepb  Generic drug: fluticasone-salmeterol  INHALE 1 PUFF INTO THE LUNGS TWICE DAILY     aspirin 81 MG Chew  CHEW AND SWALLOW 1  TABLET(81 MG) BY MOUTH DAILY     B-12 100 MCG Tabs     calcitonin (salmon) 200 UNIT/ACT Soln  Commonly known as: Miacalcin     carvedilol 25 MG Tabs  Commonly known as: Coreg  Take 1 tablet (25 mg total) by mouth 2 (two) times daily with meals.     Cholecalciferol 50 MCG (2000 UT) Caps     hydrOXYzine 25 MG Tabs  Commonly known as: Atarax  Take 1 tab PO 30 minutes before going to bed     lidocaine-menthol 4-1 % Ptch  Place 2 patches onto the skin daily.     lisinopril 10 MG Tabs  Commonly known as: Zestril  Take 1 tablet (10 mg total) by mouth 2 (two) times daily.     omeprazole 20 MG Cpdr  Commonly known as: PriLOSEC  TAKE 1 CAPSULE(20 MG) BY MOUTH EVERY MORNING BEFORE BREAKFAST     OneTouch Delica Plus [id number] Misc  1 each by Other route daily.     OneTouch Ultra Strp  Generic drug: Glucose Blood  TEST BLOOD SUGAR ONCE DAILY     tiZANidine HCl 2 MG Caps  Commonly known as: ZANAFLEX  Take 1 capsule (2 mg total) by mouth 3 (three) times daily as needed for Muscle spasms.     triamcinolone 0.1 % Oint  Commonly known as: Kenalog  APPLY TO THE AFFECTED AREA ON THE BODY TWICE DAILY               Where to Get Your Medications        These medications were sent to [id number] - [address] [place] [facility] [place] [address], [phone], [phone]  [address] [place] [id number]      Phone: [phone]   amLODIPine 2.5 MG Tabs         FU   Follow-up Information       Kyler Washington MD. Schedule an appointment as soon as possible for a visit in 1 week(s).    Specialty: Internal Medicine  Contact information:  [address] [facility] [address] [phone]               Arnulfo An MD. Schedule an appointment as soon as possible for a visit.    Specialties: Anesthesiology Pain Medicine, PAIN MANAGEMENT, Anesthesiology  Why: To discuss possible left hip injections  Contact information:  [address] [address]  [phone]                                I reconciled current and discharge medications on day of discharge, discussed changes with patient and noted changes above.       Total Time Coordinating Care: 36 minutes.     Patient had opportunity to ask questions and state understanding and agreement with therapeutic plan as outlined.    DO Giorgi Montana [facility] Hospitalist  Contact via [employer]kt[employer]/Parabel/eKonnekt

## 2024-12-06 NOTE — PLAN OF CARE
Assumed care from nocturnal RN. Patient is A&O x4 and was pleasant and cooperative throughout the shift. Daughter at bedside to translate. Patient is able to ambulate with stand by assist with a front wheel walker and back brace. Telemetry monitor reads NSR w/ 1st degree block and O2 is stable on room air. Education was provided on plan of care and patient and family voiced understanding . Patient safety was maintained. Call light and belongings are in reach.       Problem: Patient/Family Goals  Goal: Patient/Family Long Term Goal  Description: Patient's Long Term Goal: Go home    Interventions:  - routine lab draws  - medication compliance   - MD rounding  - See additional Care Plan goals for specific interventions  Outcome: Progressing  Goal: Patient/Family Short Term Goal  Description: Patient's Short Term Goal: pain free    Interventions:   -heat/ice pack  -pain medication  -See additional Care Plan goals for specific interventions  Outcome: Progressing     Problem: CARDIOVASCULAR - ADULT  Goal: Maintains optimal cardiac output and hemodynamic stability  Description: INTERVENTIONS:  - Monitor vital signs, rhythm, and trends  - Monitor for bleeding, hypotension and signs of decreased cardiac output  - Evaluate effectiveness of vasoactive medications to optimize hemodynamic stability  - Monitor arterial and/or venous puncture sites for bleeding and/or hematoma  - Assess quality of pulses, skin color and temperature  - Assess for signs of decreased coronary artery perfusion - ex. Angina  - Evaluate fluid balance, assess for edema, trend weights  Outcome: Progressing  Goal: Absence of cardiac arrhythmias or at baseline  Description: INTERVENTIONS:  - Continuous cardiac monitoring, monitor vital signs, obtain 12 lead EKG if indicated  - Evaluate effectiveness of antiarrhythmic and heart rate control medications as ordered  - Initiate emergency measures for life threatening arrhythmias  - Monitor electrolytes and  administer replacement therapy as ordered  Outcome: Progressing

## 2024-12-06 NOTE — PLAN OF CARE
Patient has been cleared for discharge. Medicar set up for transportation at 6:00pm Education provided on new medication and follow up appointment. Family and patient voiced understanding. All questions have been answered. Patient transported off unit via wheelchair. Safety maintained.      Problem: Patient/Family Goals  Goal: Patient/Family Long Term Goal  Description: Patient's Long Term Goal: Go home    Interventions:  - routine lab draws  - medication compliance   - MD rounding  - See additional Care Plan goals for specific interventions  12/5/2024 1821 by Janina Tomlin RN  Outcome: Completed  12/5/2024 1817 by Janina Tomlin RN  Outcome: Progressing  Goal: Patient/Family Short Term Goal  Description: Patient's Short Term Goal: pain free    Interventions:   -heat/ice pack  -pain medication  -See additional Care Plan goals for specific interventions  12/5/2024 1821 by Janina Tomlin RN  Outcome: Completed  12/5/2024 1817 by Janina Tomlin RN  Outcome: Progressing     Problem: CARDIOVASCULAR - ADULT  Goal: Maintains optimal cardiac output and hemodynamic stability  Description: INTERVENTIONS:  - Monitor vital signs, rhythm, and trends  - Monitor for bleeding, hypotension and signs of decreased cardiac output  - Evaluate effectiveness of vasoactive medications to optimize hemodynamic stability  - Monitor arterial and/or venous puncture sites for bleeding and/or hematoma  - Assess quality of pulses, skin color and temperature  - Assess for signs of decreased coronary artery perfusion - ex. Angina  - Evaluate fluid balance, assess for edema, trend weights  12/5/2024 1821 by Janina Tomlin RN  Outcome: Completed  12/5/2024 1817 by Janina Tomlin RN  Outcome: Progressing  Goal: Absence of cardiac arrhythmias or at baseline  Description: INTERVENTIONS:  - Continuous cardiac monitoring, monitor vital signs, obtain 12 lead EKG if indicated  - Evaluate effectiveness of antiarrhythmic and heart rate control medications as  ordered  - Initiate emergency measures for life threatening arrhythmias  - Monitor electrolytes and administer replacement therapy as ordered  12/5/2024 1821 by Janina Tomlin, RN  Outcome: Completed  12/5/2024 1817 by Janina Tomlin, RN  Outcome: Progressing

## 2024-12-18 ENCOUNTER — TELEPHONE (OUTPATIENT)
Dept: SURGERY | Facility: CLINIC | Age: 89
End: 2024-12-18

## 2024-12-18 NOTE — TELEPHONE ENCOUNTER
Message below noted.    Patient was scheduled for 12/30/24 for HFU with Nereyda. Patient granddaughter Sugar wanted same day appointment as Dr. An, but there is no availability with Nereyda that day.    Patient has been re-scheduled with Rossana for 1/13/25.     Progress Note 12/05/24  \"ASSESSMENT:  Syncope  Elevated troponin  Left hip pain  Subacute compression fracture of L1  Chronic compression fractures of T12, L2, L3, L4, T12     PLAN:  No acute surgical intervention is indicated  Discussed with daughter at bedside and granddaughter (Chioma) via phone that conservative management is again recommended, in setting of patient's advanced age and comorbidities. She currently has no back pain nor leg symptoms and fracture morphology is stable, therefore kyphoplasty likely not of benefit at this time.  Recommend imaging of left hip in setting of pt's currently complaints - discussed with hospitalist, Dr. Kellogg   Medical management and pain control per hospitalist  Encourage OOB, PT/OT  TLSO as needed for comfort   Consider pain management evaluation for consideration of hip injection pending imaging findings - I have reached out to Dr. An\"    Routed to Provider.

## 2024-12-18 NOTE — TELEPHONE ENCOUNTER
Pt's granddaughter cx appt with Nereyda for HFU on 12/30/2024.States too much for grandmother and wanted appt same day as Dr An appt. Nereyda did not have availability and granddaughter requested appt with Rossana as stated Rossana also saw in hospital. Pt was r/s to see Rossana on 1/13/2025, please advise if ok or if pt to see Nereyda. Granddaughter aware office will reach out to confirm appt or r/s .

## 2024-12-19 NOTE — TELEPHONE ENCOUNTER
Patient should follow-up with pain management and/or physiatry for pain control. She was seen during both hospitalizations by Neurosurgery and no surgical intervention is recommended for her. I recommend that she be re-scheduled with one of our colleagues that specialize in non-operative pain management.

## 2025-01-13 ENCOUNTER — OFFICE VISIT (OUTPATIENT)
Dept: SURGERY | Facility: CLINIC | Age: OVER 89
End: 2025-01-13
Payer: MEDICARE

## 2025-01-13 ENCOUNTER — OFFICE VISIT (OUTPATIENT)
Dept: PAIN CLINIC | Facility: CLINIC | Age: OVER 89
End: 2025-01-13
Payer: MEDICARE

## 2025-01-13 ENCOUNTER — HOSPITAL ENCOUNTER (OUTPATIENT)
Dept: GENERAL RADIOLOGY | Facility: HOSPITAL | Age: OVER 89
Discharge: HOME | End: 2025-01-13
Payer: MEDICARE

## 2025-01-13 VITALS — SYSTOLIC BLOOD PRESSURE: 102 MMHG | HEART RATE: 61 BPM | DIASTOLIC BLOOD PRESSURE: 52 MMHG | OXYGEN SATURATION: 98 %

## 2025-01-13 DIAGNOSIS — S32.000A COMPRESSION FRACTURE OF LUMBAR VERTEBRA, UNSPECIFIED LUMBAR VERTEBRAL LEVEL, INITIAL ENCOUNTER (HCC): Primary | ICD-10-CM

## 2025-01-13 DIAGNOSIS — S32.000A COMPRESSION FRACTURE OF LUMBAR VERTEBRA, UNSPECIFIED LUMBAR VERTEBRAL LEVEL, INITIAL ENCOUNTER (HCC): ICD-10-CM

## 2025-01-13 DIAGNOSIS — M51.360 DEGENERATION OF INTERVERTEBRAL DISC OF LUMBAR REGION WITH DISCOGENIC BACK PAIN: Primary | ICD-10-CM

## 2025-01-13 PROBLEM — J18.9 PNEUMONIA OF RIGHT LUNG DUE TO INFECTIOUS ORGANISM: Status: ACTIVE | Noted: 2023-03-30

## 2025-01-13 PROBLEM — L84 CORN OF FOOT: Status: ACTIVE | Noted: 2024-03-29

## 2025-01-13 PROBLEM — E11.9 DIABETES MELLITUS TYPE 2 WITHOUT RETINOPATHY (HCC): Status: RESOLVED | Noted: 2017-08-07 | Resolved: 2025-01-13

## 2025-01-13 PROCEDURE — 99215 OFFICE O/P EST HI 40 MIN: CPT | Performed by: ANESTHESIOLOGY

## 2025-01-13 PROCEDURE — 72110 X-RAY EXAM L-2 SPINE 4/>VWS: CPT

## 2025-01-13 NOTE — PROGRESS NOTES
[facility]   Neurosurgery Clinic Visit  25     Vargas Power PCP:  Kyler Washington MD    6/15/1929 MRN [id number]     REASON FOR VISIT: L1 compression fracture    SUBJECTIVE     Vargas Power is a pleasant 89 year old female who presents for hospital follow-up. She is accompanied by her daughter who assists with translation. Patient was hospitalized in early December for a syncopal episode of unknown etiology. Neurosurgery was consulted during this hospitalization, as pt had recently been found to have a subacute L1 compression fracture after sustaining a fall in mid November. Pt was evaluated by our Neurosurgical service during both hospital stays and conservative management of L1 fx was advised, given that pt remained neurologically intact and family did not wish to pursue any invasive treatment. Given pt's advanced age and comorbidities, surgical intervention was not advised.     Today, daughter states pt has been doing well. Pt has moved in with her daughter and currently has home PT/OT visits. She is able to ambulate comfortable while wearing TLSO brace. She reports ongoing left hip pain (rated 5/10 in severity) and proximal LLE discomfort with movement, both of which she reported while inpatient. She has hip pain only with movement. XR left hip completed in hospital showed arthritic changes. She has no back pain currently. No radiating leg pain nor numbness, tingling, weakness of lower extremities. No bowel/bladder habit changes.     They had an appointment with Dr. An this morning and, given that her pain has improved conservatively and there is no reproducible back pain on exam, kyphoplasty was not advised.    Hospital Consult 24:  Vargas Power is a 89 year old female with PMH of HTN, NSVT, AVB, prior syncope, and recent L1 compression fracture who presented to ED on 24 following a syncopal event of unknown etiology. Patient reportedly lost consciousness for less than 1 minute while  daughter was assisting her out of bed. There was no fall nor head injury. She was found to have minimally elevated troponin in ED and has undergone a cardiac workup which has thus far been unremarkable. Pt was recently admitted 11/15-11/20 for acute L1 compression fx. She was evaluated by our service and  conservative treatment with bracing and medication therapy was advised. She was seen by Pain Management while inpatient but family declined kyphoplasty at that time. MRI lumbar spine was repeated in setting on ongoing back pain this admission; shows multilevel advanced DDD/DJD and stability of subacute L1 compression fx. Neurosurgery has been re-consulted for review.      Daughter at bedside assists with translation. On exam, pt reports left hip pain. She has no back pain nor radiating leg pain. She has no numbness, tingling, or weakness of lower extremities. No changes in bowel/bladder habits. She is ambulatory with a walker and has been wearing TLSO brace when OOB.    PAST MEDICAL HISTORY     Past Medical History:    Arrhythmia    palpitations    Asthma    Back problem    Benign neoplasm of kidney, except pelvis    no change 11/01 to 11/02    Blind    Chest pain, unspecified    1/01 stress echo neg    Cholelithiasis    asymptomatic    Esophageal reflux    12/01: EGD    Hearing impaired person, bilateral    Helicobacter pylori (H. pylori)    treated    HTN    lytes wnl 9/08    Irritable bowel syndrome    longstanding chronic abdominal pain    Muscle weakness    Osteoporosis    fosamax [id number] (SE)    Other and unspecified ovarian cyst    complex cyst (Dr. Cid)    Urge incontinence    Visual impairment    Vitamin D deficiency     PAST SURGICAL HISTORY     Past Surgical History:   Procedure Laterality Date    Colonoscopy,diagnostic  12/01    [facility]    Injection, w/wo contrast, dx/therapeutic substance, epidural/subarachnoid; lumbar/sacral N/A 1/29/2016    Procedure: CAUDAL;  Surgeon: Daniel Mclean MD;   Location: [facility]    Patient documented not to have experienced any of the following events N/A 1/29/2016    Procedure: CAUDAL;  Surgeon: Daniel Mclean MD;  Location: [facility]    Patient withough preoperative order for iv antibiotic surgical site infection prophylaxis. N/A 1/29/2016    Procedure: CAUDAL;  Surgeon: Daniel Mclean MD;  Location: [facility]    Upper gi endoscopy,diagnosis  12/01    wnl      MEDICATIONS     Cannot display prior to admission medications because the patient has not been admitted in this contact.      Prescriptions Prior to Admission[1]    REVIEW OF SYSTEMS     Comprehensive Review of Systems obtained, and is negative other than that mentioned in the History of Present Illness.    Denies fever, chills, shortness of breath or chest pain.    PHYSICAL EXAMINATION     VITALS: There were no vitals taken for this visit.    GENERAL: No acute distress, speech fluent, mood appropriate    HEENT: Normocephalic, atraumatic    MUSCULOSKELETAL: Even tone. Moving bilateral upper and lower extremities spontaneously and against resistance. No point tenderness on palpation of midline lumbar spinous processes. TLSO brace on.    NEURO: Awake, alert and oriented x3.  Sensation to light touch is intact bilaterally.  Gait deferred; presents today in wheelchair.   x 4. Gait deferred.     LOWER EXTREMITY STRENGTH:    Iliospoas  Hamstrings  Quads  D-flexion  P-flexion EHL     Right 5 5 5 5 5 5     Left 5 5 5 5 5 5     UPPER EXTREMITY STRENGTH:    Deltoid  Biceps  Triceps    Finger abduction     Right 5 5 5 5 5     Left 5 5 5 5 5     IMAGING     XR LUMBAR SPINE (MIN 4 VIEWS) (CPT=72110)    Result Date: 1/13/2025  CONCLUSION:  There are multiple chronic appearing compression fractures, most notable at T12 where it is severe.  Moderate to severe degenerative changes in the lumbar spine.   LOCATION:  [facility]   Dictated by (CST): Stromberg,  MD Krissy on 1/13/2025 at 9:18 AM     Finalized by (CST): Stromberg, LeRoy, MD on 1/13/2025 at 9:21 AM         ASSESSMENT AND PLAN     ASSESSMENT:   Subacute compression fracture of L1  Chronic compression fractures of T12 (severe), L2, L3, L4, T12  Left hip pain  Osteoporosis     PLAN:   May continue to wear TLSO brace as needed for comfort  Continue home PT/OT   Follow up with PCP to discuss osteoporosis treatment  No further follow up with Neurosurgery is indicated    Plan of care was discussed with the patient and her daughter. All questions and concerns have been addressed at this time. The patient verbalized understanding, agrees with the plan, and was appreciative.     Rossana Ellis, [address], [address] [address]  [phone]         [1] (Not in a hospital admission)

## 2025-01-13 NOTE — PROGRESS NOTES
Subjective:   Patient ID: Vargas Power is a 89 year old female.    HPI    History/Other:   Review of Systems  Current Outpatient Medications   Medication Sig Dispense Refill   • amLODIPine 2.5 MG Oral Tab Take 1 tablet (2.5 mg total) by mouth every evening. 30 tablet 1   • calcitonin, salmon, 200 UNIT/ACT Nasal Solution 1 spray by Nasal route daily.     • lidocaine-menthol 4-1 % External Patch Place 2 patches onto the skin daily. 30 patch 0   • ASPIRIN 81 MG Oral Chew Tab CHEW AND SWALLOW 1 TABLET(81 MG) BY MOUTH DAILY 90 tablet 0   • OMEPRAZOLE 20 MG Oral Capsule Delayed Release TAKE 1 CAPSULE(20 MG) BY MOUTH EVERY MORNING BEFORE BREAKFAST 90 capsule 0   • triamcinolone 0.1 % External Ointment APPLY TO THE AFFECTED AREA ON THE BODY TWICE DAILY 454 g 0   • ONETOUCH ULTRA In Vitro Strip TEST BLOOD SUGAR ONCE DAILY 100 strip 0   • Lancets (ONETOUCH DELICA PLUS [id number]) Does not apply Misc 1 each by Other route daily. 100 each 1   • ADVAIR DISKUS 500-50 MCG/DOSE Inhalation Aerosol Powder, Breath Activated INHALE 1 PUFF INTO THE LUNGS TWICE DAILY 180 each 0   • carvedilol 25 MG Oral Tab Take 1 tablet (25 mg total) by mouth 2 (two) times daily with meals. 180 tablet 3   • lisinopril 10 MG Oral Tab Take 1 tablet (10 mg total) by mouth 2 (two) times daily. 180 tablet 3   • tiZANidine HCl 2 MG Oral Cap Take 1 capsule (2 mg total) by mouth 3 (three) times daily as needed for Muscle spasms. 30 capsule 0   • hydrOXYzine HCl 25 MG Oral Tab Take 1 tab PO 30 minutes before going to bed 30 tablet 2   • Cholecalciferol 2000 units Oral Cap Take 2,000 Units by mouth daily.     • Cyanocobalamin (B-12) 100 MCG Oral Tab Take 100 mcg by mouth daily.       Allergies:Allergies[1]    Objective:   Physical Exam  Constitutional:          Assessment & Plan:   No diagnosis found.    No orders of the defined types were placed in this encounter.      Meds This Visit:  Requested Prescriptions      No prescriptions requested or ordered in this  encounter       Imaging & Referrals:  None    Location of Pain: left side low back and hip    Date Pain Began: 11/10/24          Work Related:   No        Receiving Work Comp/Disability:   No    Numeric Rating Scale:  Pain at Present:  10/10                                                                                                            (No Pain) 0  to  10 (Worst Pain)                 Minimum Pain:   8  Maximum Pain  10    Distribution of Pain:    left    Quality of Pain:   sharp/stabbing    Origin of Pain:    Lifting and Repetitive motion    Aggravating Factors:    Cold, Sitting, Standing, and Walking    Past Treatments for Current Pain Condition:   NSAIDS, brace    Prior diagnostic testing for your pain:  xray, MRI, CT          [1] No Active Allergies

## 2025-01-13 NOTE — PROGRESS NOTES
Established patient:  Reason for follow up: low back and hip     Numeric Rating Scale:     Pain at Present:  5/10       Distribution of Pain:     left side,  sharp/stabbing       Most recent treatments for Current Pain Condition:      Past Treatments for Current Pain Condition:   NSAIDS, brace      New imaging or testing since your last office visit:    xray, MRI, CT

## 2025-01-13 NOTE — PATIENT INSTRUCTIONS
Refill policies:    Allow 2-3 business days for refills; controlled substances may take longer.  Contact your pharmacy at least 5 days prior to running out of medication and have them send an electronic request or submit request through the “request refill” option in your [employer] account.  Refills are not addressed on weekends; covering physicians do not authorize routine medications on weekends.  No narcotics or controlled substances are refilled after noon on Fridays or by on call physicians.  By law, narcotics must be electronically prescribed.  A 30 day supply with no refills is the maximum allowed.  If your prescription is due for a refill, you may be due for a follow up appointment.  To best provide you care, patients receiving routine medications need to be seen at least once a year.  Patients receiving narcotic/controlled substance medications need to be seen at least once every 3 months.  In the event that your preferred pharmacy does not have the requested medication in stock (e.g. Backordered), it is your responsibility to find another pharmacy that has the requested medication available.  We will gladly send a new prescription to that pharmacy at your request.    Scheduling Tests:    If your physician has ordered radiology tests such as MRI or CT scans, please contact Central Scheduling at [phone] right away to schedule the test.  Once scheduled, the [facility] Centralized Referral Team will work with your insurance carrier to obtain pre-certification or prior authorization.  Depending on your insurance carrier, approval may take 3-10 days.  It is highly recommended patients assure they have received an authorization before having a test performed.  If test is done without insurance authorization, patient may be responsible for the entire amount billed.      Precertification and Prior Authorizations:  If your physician has recommended that you have a procedure or additional testing performed the [facility]  Centralized Referral Team will contact your insurance carrier to obtain pre-certification or prior authorization.    You are strongly encouraged to contact your insurance carrier to verify that your procedure/test has been approved and is a COVERED benefit.  Although the [facility] Centralized Referral Team does its due diligence, the insurance carrier gives the disclaimer that \"Although the procedure is authorized, this does not guarantee payment.\"    Ultimately the patient is responsible for payment.   Thank you for your understanding in this matter.  Paperwork Completion:  If you require FMLA or disability paperwork for your recovery, please make sure to either drop it off or have it faxed to our office at [phone]. Be sure the form has your name and date of birth on it.  The form will be faxed to our Forms Department and they will complete it for you.  There is a 25$ fee for all forms that need to be filled out.  Please be aware there is a 10-14 day turnaround time.  You will need to sign a release of information (MOUNA) form if your paperwork does not come with one.  You may call the Forms Department with any questions at [phone].  Their fax number is [phone].

## 2025-01-13 NOTE — H&P
Name: Vargas Power   : 6/15/1929   DOS: 2025     Chief complaint: Left hip pain    History of present illness:  Vargas Power is a 89 year old female with a history of osteoporosis, hypertension, who presents as a ER follow-up.  The patient was seen in the hospital due to compression fracture.  At that time, patient did not have much pain in the axial spine but was complaining of pain in the left hip region.  Since discharge, patient has had ER follow-up with new imaging with evidence of subacute L1 compression fracture.  Once again, patient reports minimal discomfort in the thoracic spine.  Her pain is primarily in her left hip.  Does have moderate osteoarthritis in the hip.  Patient is accompanied by 2 daughters today who provided translation.  Additionally, patient is wearing her TLSO brace and is quite comfortable.  Today, denies any back pain.  Did have some mild discomfort transitioning for x-ray today.  This has since resolved.    She denies any chills, fever or weakness. She denies any bladder or bowel incontinence.      Past Medical History:    Arrhythmia    palpitations    Asthma    Back problem    Benign neoplasm of kidney, except pelvis    no change  to     Blind    Chest pain, unspecified     stress echo neg    Cholelithiasis    asymptomatic    Esophageal reflux    : EGD    Hearing impaired person, bilateral    Helicobacter pylori (H. pylori)    treated    HTN    lytes wnl     Irritable bowel syndrome    longstanding chronic abdominal pain    Muscle weakness    Osteoporosis    fosamax [phone] (SE)    Other and unspecified ovarian cyst    complex cyst (Dr. Cid)    Urge incontinence    Visual impairment    Vitamin D deficiency      Current Outpatient Medications   Medication Sig Dispense Refill    amLODIPine 2.5 MG Oral Tab Take 1 tablet (2.5 mg total) by mouth every evening. 30 tablet 1    calcitonin, salmon, 200 UNIT/ACT Nasal Solution 1 spray by Nasal route daily.       lidocaine-menthol 4-1 % External Patch Place 2 patches onto the skin daily. 30 patch 0    ASPIRIN 81 MG Oral Chew Tab CHEW AND SWALLOW 1 TABLET(81 MG) BY MOUTH DAILY 90 tablet 0    OMEPRAZOLE 20 MG Oral Capsule Delayed Release TAKE 1 CAPSULE(20 MG) BY MOUTH EVERY MORNING BEFORE BREAKFAST 90 capsule 0    triamcinolone 0.1 % External Ointment APPLY TO THE AFFECTED AREA ON THE BODY TWICE DAILY 454 g 0    ONETOUCH ULTRA In Vitro Strip TEST BLOOD SUGAR ONCE DAILY 100 strip 0    Lancets (ONETOUCH DELICA PLUS [id number]) Does not apply Misc 1 each by Other route daily. 100 each 1    ADVAIR DISKUS 500-50 MCG/DOSE Inhalation Aerosol Powder, Breath Activated INHALE 1 PUFF INTO THE LUNGS TWICE DAILY (Patient taking differently: daily as needed.) 180 each 0    carvedilol 25 MG Oral Tab Take 1 tablet (25 mg total) by mouth 2 (two) times daily with meals. 180 tablet 3    lisinopril 10 MG Oral Tab Take 1 tablet (10 mg total) by mouth 2 (two) times daily. 180 tablet 3    tiZANidine HCl 2 MG Oral Cap Take 1 capsule (2 mg total) by mouth 3 (three) times daily as needed for Muscle spasms. 30 capsule 0    hydrOXYzine HCl 25 MG Oral Tab Take 1 tab PO 30 minutes before going to bed 30 tablet 2    Cholecalciferol 2000 units Oral Cap Take 2,000 Units by mouth daily.      Cyanocobalamin (B-12) 100 MCG Oral Tab Take 100 mcg by mouth daily.       Past Surgical History:   Procedure Laterality Date    Colonoscopy,diagnostic  12/01    wnl    Injection, w/wo contrast, dx/therapeutic substance, epidural/subarachnoid; lumbar/sacral N/A 1/29/2016    Procedure: CAUDAL;  Surgeon: Daniel Mclean MD;  [facility]    Patient documented not to have experienced any of the following events N/A 1/29/2016    Procedure: CAUDAL;  Surgeon: Daniel Mclean MD;  [facility]: [facility]    Patient withough preoperative order for iv antibiotic surgical site infection prophylaxis. N/A 1/29/2016    Procedure: CAUDAL;   Surgeon: Daniel Mclean MD;  Location: [facility] [place] [facility]    Upper gi endoscopy,diagnosis  12/01    wnl      Family History   Problem Relation Age of Onset    Heart Disorder Mother         heart failure    Cancer Sister         colon cancer age 89     Social History     Socioeconomic History    Marital status:    Occupational History    Occupation: [employer]ian, lives with dtr   Tobacco Use    Smoking status: Never    Smokeless tobacco: Never   Vaping Use    Vaping status: Never Used   Substance and Sexual Activity    Alcohol use: No    Drug use: Never   Social History Narrative    Health Mnt:    Pap: 9/07, 2/10 Defer due to age    Mamm: 10/08, 1/10, ref 1/11    Breast exam: 9/08,5/09, 2/10, 1/11    DEXA:1/01, 2/05 , 2/10, 8/10 (ordered elsewhere)    Cholesterol: 10/05 (96), 2/10 Defer due to age    Colon: 12/01 (10 yrs) defer due to age     H/O:         calcium: no supplement/ Vit D as above    exercise:  walks,     Social Drivers of Health     Financial Resource Strain: Low Risk  (3/30/2023)    Received from [facility]    Financial Resource Strain     In the past year, have you or any family members you live with been unable to get any of the following when it was really needed? Check all that apply.: None   Food Insecurity: No Food Insecurity (11/30/2024)    Food Insecurity     Food Insecurity: Never true   Transportation Needs: No Transportation Needs (11/30/2024)    Transportation Needs     Lack of Transportation: No   Social Connections: Low Risk  (3/30/2023)    Received from [facility]    Social Connections     How often do you see or talk to people that you care about and feel close to? (For example: talking to friends on the phone, visiting friends or family, going to Christian or club meetings): 5 or more times a week   Housing Stability: Low Risk  (11/30/2024)    Housing Stability     Housing Instability: No       Review of  other systems:  10 point review of  systems otherwise negative    Physical examination: Vargas is a 89 year old female not in acute distress  /52 (BP Location: Left arm, Patient Position: Sitting, Cuff Size: adult)   Pulse 61   SpO2 98%    The patient is awake, alert, oriented and corporative. She has a normal affect. The patient is observed wearing TLSO brace and seated in wheelchair in no acute distress HEENT: No gross lesion noted. PEERL. No icterus.  Neck and Upper Extremity: Supple. No thyromegaly or lymphadenopathy.  Bilateral upper EXTR and range of motion intact.  Fine and gross motor intact  Cardiovascular system:No peripheral edema  Respiratory system: Breath sounds equal bilaterally.   Abdomen: Soft, nontende   Neurologic:  Cranial nerves II through XII are grossly intact.       Examination of the lower back: TLSO brace on.  No pain with palpation of spinous processes along the midline.    Radiology diagnostic studies:   MRI reviewed with subacute L1 compression fracture.  Severe T12 compression fracture which is chronic hip x-ray with moderate osteoarthritis      Assessment:  Encounter Diagnoses   Name Primary?    Degeneration of intervertebral disc of lumbar region with discogenic back pain Yes    Compression fracture of lumbar vertebra, unspecified lumbar vertebral level, initial encounter ([facility])    .      Plan:     The patient is a 89-year-old female who presents today accompanied by family as part of follow-up from the emergency room discharge.  The patient has known osteoporosis and has had multiple previous compression fractures.  Most recently with evidence of L1 subacute compression fracture.  However, patient denies any back pain.  Given family goals of more comfort related care, do not believe kyphoplasty is warranted given lack of reproducible symptoms with palpation along the midline at the L1 and T12 level.  Patient's primary complaint is along the left hip.  This is made worse by motion.  Likely consistent with moderate  osteoarthritis seen on hip x-ray.  Did offer patient hip x-ray.  However, she states that her hip pain generally resolves after resting for some time.  She did have a slight exacerbation of pain today because she did have some imaging done.  Now that she has been able to sit, though symptoms have resolved.  As of this interview, she has 0 out of 10 pain.  Patient to follow-up on as-needed basis.  Arnulfo An MD MPH  Pain Management

## 2025-01-13 NOTE — PATIENT INSTRUCTIONS
Refill policies:    Allow 2-3 business days for refills; controlled substances may take longer.  Contact your pharmacy at least 5 days prior to running out of medication and have them send an electronic request or submit request through the “request refill” option in your [employer] account.  Refills are not addressed on weekends; covering physicians do not authorize routine medications on weekends.  No narcotics or controlled substances are refilled after noon on Fridays or by on call physicians.  By law, narcotics must be electronically prescribed.  A 30 day supply with no refills is the maximum allowed.  If your prescription is due for a refill, you may be due for a follow up appointment.  To best provide you care, patients receiving routine medications need to be seen at least once a year.  Patients receiving narcotic/controlled substance medications need to be seen at least once every 3 months.  In the event that your preferred pharmacy does not have the requested medication in stock (e.g. Backordered), it is your responsibility to find another pharmacy that has the requested medication available.  We will gladly send a new prescription to that pharmacy at your request.    Scheduling Tests:    If your physician has ordered radiology tests such as MRI or CT scans, please contact Central Scheduling at [phone] right away to schedule the test.  Once scheduled, the [facility] Centralized Referral Team will work with your insurance carrier to obtain pre-certification or prior authorization.  Depending on your insurance carrier, approval may take 3-10 days.  It is highly recommended patients assure they have received an authorization before having a test performed.  If test is done without insurance authorization, patient may be responsible for the entire amount billed.      Precertification and Prior Authorizations:  If your physician has recommended that you have a procedure or additional testing performed the [facility]  Centralized Referral Team will contact your insurance carrier to obtain pre-certification or prior authorization.    You are strongly encouraged to contact your insurance carrier to verify that your procedure/test has been approved and is a COVERED benefit.  Although the [facility] Centralized Referral Team does its due diligence, the insurance carrier gives the disclaimer that \"Although the procedure is authorized, this does not guarantee payment.\"    Ultimately the patient is responsible for payment.   Thank you for your understanding in this matter.  Paperwork Completion:  If you require FMLA or disability paperwork for your recovery, please make sure to either drop it off or have it faxed to our office at [phone]. Be sure the form has your name and date of birth on it.  The form will be faxed to our Forms Department and they will complete it for you.  There is a 25$ fee for all forms that need to be filled out.  Please be aware there is a 10-14 day turnaround time.  You will need to sign a release of information (MOUNA) form if your paperwork does not come with one.  You may call the Forms Department with any questions at [phone].  Their fax number is [phone].

## 2025-03-31 ENCOUNTER — HOSPITAL ENCOUNTER (EMERGENCY)
Facility: HOSPITAL | Age: OVER 89
Discharge: HOME | End: 2025-03-31
Attending: STUDENT IN AN ORGANIZED HEALTH CARE EDUCATION/TRAINING PROGRAM
Payer: MEDICARE

## 2025-03-31 VITALS
SYSTOLIC BLOOD PRESSURE: 157 MMHG | RESPIRATION RATE: 19 BRPM | HEART RATE: 66 BPM | DIASTOLIC BLOOD PRESSURE: 76 MMHG | TEMPERATURE: 98 F | OXYGEN SATURATION: 100 %

## 2025-03-31 DIAGNOSIS — R55 SYNCOPE, VASOVAGAL: Primary | ICD-10-CM

## 2025-03-31 DIAGNOSIS — R19.7 DIARRHEA, UNSPECIFIED TYPE: ICD-10-CM

## 2025-03-31 LAB
ALBUMIN SERPL-MCNC: 3.8 G/DL (ref 3.2–4.8)
ALBUMIN/GLOB SERPL: 1.7 {RATIO} (ref 1–2)
ALP LIVER SERPL-CCNC: 91 U/L
ALT SERPL-CCNC: 26 U/L
ANION GAP SERPL CALC-SCNC: 5 MMOL/L (ref 0–18)
AST SERPL-CCNC: 33 U/L (ref ?–34)
BASOPHILS # BLD AUTO: 0.03 X10(3) UL (ref 0–0.2)
BASOPHILS NFR BLD AUTO: 0.6 %
BILIRUB SERPL-MCNC: 1.1 MG/DL (ref 0.2–0.9)
BILIRUB UR QL STRIP.AUTO: NEGATIVE
BUN BLD-MCNC: 19 MG/DL (ref 9–23)
CALCIUM BLD-MCNC: 9 MG/DL (ref 8.7–10.6)
CHLORIDE SERPL-SCNC: 104 MMOL/L (ref 98–112)
CO2 SERPL-SCNC: 30 MMOL/L (ref 21–32)
COLOR UR AUTO: YELLOW
CREAT BLD-MCNC: 1.01 MG/DL
EGFRCR SERPLBLD CKD-EPI 2021: 51 ML/MIN/1.73M2 (ref 60–?)
EOSINOPHIL # BLD AUTO: 0.21 X10(3) UL (ref 0–0.7)
EOSINOPHIL NFR BLD AUTO: 4.2 %
ERYTHROCYTE [DISTWIDTH] IN BLOOD BY AUTOMATED COUNT: 13.9 %
FLUAV + FLUBV RNA SPEC NAA+PROBE: NEGATIVE
FLUAV + FLUBV RNA SPEC NAA+PROBE: NEGATIVE
GLOBULIN PLAS-MCNC: 2.3 G/DL (ref 2–3.5)
GLUCOSE BLD-MCNC: 117 MG/DL (ref 70–99)
GLUCOSE UR STRIP.AUTO-MCNC: NORMAL MG/DL
HCT VFR BLD AUTO: 34.1 %
HGB BLD-MCNC: 11.1 G/DL
HYALINE CASTS #/AREA URNS AUTO: PRESENT /LPF
IMM GRANULOCYTES # BLD AUTO: 0.02 X10(3) UL (ref 0–1)
IMM GRANULOCYTES NFR BLD: 0.4 %
LEUKOCYTE ESTERASE UR QL STRIP.AUTO: NEGATIVE
LYMPHOCYTES # BLD AUTO: 1.6 X10(3) UL (ref 1–4)
LYMPHOCYTES NFR BLD AUTO: 31.7 %
MCH RBC QN AUTO: 25 PG (ref 26–34)
MCHC RBC AUTO-ENTMCNC: 32.6 G/DL (ref 31–37)
MCV RBC AUTO: 76.8 FL
MONOCYTES # BLD AUTO: 0.46 X10(3) UL (ref 0.1–1)
MONOCYTES NFR BLD AUTO: 9.1 %
NEUTROPHILS # BLD AUTO: 2.73 X10 (3) UL (ref 1.5–7.7)
NEUTROPHILS # BLD AUTO: 2.73 X10(3) UL (ref 1.5–7.7)
NEUTROPHILS NFR BLD AUTO: 54 %
NITRITE UR QL STRIP.AUTO: NEGATIVE
OSMOLALITY SERPL CALC.SUM OF ELEC: 291 MOSM/KG (ref 275–295)
PH UR STRIP.AUTO: 6 [PH] (ref 5–8)
PLATELET # BLD AUTO: 183 10(3)UL (ref 150–450)
POTASSIUM SERPL-SCNC: 3.4 MMOL/L (ref 3.5–5.1)
PROT SERPL-MCNC: 6.1 G/DL (ref 5.7–8.2)
PROT UR STRIP.AUTO-MCNC: 100 MG/DL
RBC # BLD AUTO: 4.44 X10(6)UL
RBC UR QL AUTO: NEGATIVE
RSV RNA SPEC NAA+PROBE: NEGATIVE
SARS-COV-2 RNA RESP QL NAA+PROBE: NOT DETECTED
SODIUM SERPL-SCNC: 139 MMOL/L (ref 136–145)
SP GR UR STRIP.AUTO: 1.02 (ref 1–1.03)
UROBILINOGEN UR STRIP.AUTO-MCNC: 2 MG/DL
WBC # BLD AUTO: 5.1 X10(3) UL (ref 4–11)

## 2025-03-31 PROCEDURE — 93005 ELECTROCARDIOGRAM TRACING: CPT

## 2025-03-31 PROCEDURE — 96360 HYDRATION IV INFUSION INIT: CPT

## 2025-03-31 PROCEDURE — 85025 COMPLETE CBC W/AUTO DIFF WBC: CPT | Performed by: STUDENT IN AN ORGANIZED HEALTH CARE EDUCATION/TRAINING PROGRAM

## 2025-03-31 PROCEDURE — 81001 URINALYSIS AUTO W/SCOPE: CPT | Performed by: STUDENT IN AN ORGANIZED HEALTH CARE EDUCATION/TRAINING PROGRAM

## 2025-03-31 PROCEDURE — 99284 EMERGENCY DEPT VISIT MOD MDM: CPT

## 2025-03-31 PROCEDURE — 0241U SARS-COV-2/FLU A AND B/RSV BY PCR (GENEXPERT): CPT | Performed by: STUDENT IN AN ORGANIZED HEALTH CARE EDUCATION/TRAINING PROGRAM

## 2025-03-31 PROCEDURE — 99285 EMERGENCY DEPT VISIT HI MDM: CPT

## 2025-03-31 PROCEDURE — 80053 COMPREHEN METABOLIC PANEL: CPT | Performed by: STUDENT IN AN ORGANIZED HEALTH CARE EDUCATION/TRAINING PROGRAM

## 2025-03-31 PROCEDURE — 93010 ELECTROCARDIOGRAM REPORT: CPT

## 2025-03-31 NOTE — PROGRESS NOTES
Patient is stable for discharge to home/self care. Verified address. Pending for [facility] with ETA [phone]. Notified patient son and daughter at bedside.

## 2025-03-31 NOTE — ED PROVIDER NOTES
Patient Seen in: [facility] Emergency Department      History     Chief Complaint   Patient presents with    Dizziness     Stated Complaint:     Subjective:   HPI      Patient is a 89-year-old female brought in by EMS for reported vagal episode.  Patient was assisted to the bathroom by her caregiver as she is had diarrhea today with the second episode being in the moment.  Patient's caregiver states that the patient's head was slumped over and she was not responding to her name and she felt cold to the touch.  She called the doctor who then recommended she call 911.  On arrival EMS states patient was awake alert but slightly weak.  Here patient is alert stating she feels fine has no abdominal pain or other symptoms.    Objective:     Past Medical History:    Arrhythmia    palpitations    Asthma    Back problem    Benign neoplasm of kidney, except pelvis    no change 11/01 to 11/02    Blind    Chest pain, unspecified    1/01 stress echo neg    Cholelithiasis    asymptomatic    Esophageal reflux    12/01: EGD    Hearing impaired person, bilateral    Helicobacter pylori (H. pylori)    treated    HTN    lytes wnl 9/08    Irritable bowel syndrome    longstanding chronic abdominal pain    Muscle weakness    Osteoporosis    fosamax [phone] (SE)    Other and unspecified ovarian cyst    complex cyst (Dr. Cid)    Urge incontinence    Visual impairment    Vitamin D deficiency              Past Surgical History:   Procedure Laterality Date    Colonoscopy,diagnostic  12/01    wnl    Injection, w/wo contrast, dx/therapeutic substance, epidural/subarachnoid; lumbar/sacral N/A 1/29/2016    Procedure: CAUDAL;  Surgeon: Daniel Mclean MD;  Location: [facility]    Patient documented not to have experienced any of the following events N/A 1/29/2016    Procedure: CAUDAL;  Surgeon: Daniel Mclean MD;  Location: [facility]    Patient withough preoperative order for iv antibiotic  surgical site infection prophylaxis. N/A 1/29/2016    Procedure: CAUDAL;  Surgeon: Daniel Mclean MD;  Location: [facility]    Upper gi endoscopy,diagnosis  12/01    wnl                Social History     Socioeconomic History    Marital status:    Occupational History    Occupation: [employer]ian, lives with dtr   Tobacco Use    Smoking status: Never    Smokeless tobacco: Never   Vaping Use    Vaping status: Never Used   Substance and Sexual Activity    Alcohol use: No    Drug use: Never   Social History Narrative    Health Mnt:    Pap: 9/07, 2/10 Defer due to age    Mamm: 10/08, 1/10, ref 1/11    Breast exam: 9/08,5/09, 2/10, 1/11    DEXA:1/01, 2/05 , 2/10, 8/10 (ordered elsewhere)    Cholesterol: 10/05 (96), 2/10 Defer due to age    Colon: 12/01 (10 yrs) defer due to age     H/O:         calcium: no supplement/ Vit D as above    exercise:  walks,     Social Drivers of Health     Food Insecurity: No Food Insecurity (11/30/2024)    Food Insecurity     Food Insecurity: Never true   Transportation Needs: No Transportation Needs (11/30/2024)    Transportation Needs     Lack of Transportation: No   Housing Stability: Low Risk  (11/30/2024)    Housing Stability     Housing Instability: No                  Physical Exam     ED Triage Vitals   BP 03/31/25 1114 158/53   Pulse 03/31/25 1114 57   Resp 03/31/25 1114 17   Temp 03/31/25 1217 98.1 °F (36.7 °C)   Temp src 03/31/25 1114 Oral   SpO2 03/31/25 1114 100 %   O2 Device 03/31/25 1114 None (Room air)       Current Vitals:   No data recorded      Physical Exam  Vitals and nursing note reviewed.   Constitutional:       General: She is not in acute distress.     Appearance: Normal appearance.   HENT:      Head: Normocephalic.      Nose: Nose normal.      Mouth/Throat:      Mouth: Mucous membranes are moist.   Eyes:      Extraocular Movements: Extraocular movements intact.      Pupils: Pupils are equal, round, and reactive to light.   Cardiovascular:       Rate and Rhythm: Normal rate and regular rhythm.      Pulses: Normal pulses.   Pulmonary:      Effort: Pulmonary effort is normal.   Abdominal:      General: Abdomen is flat. Bowel sounds are normal. There is no distension.      Palpations: Abdomen is soft.      Tenderness: There is no abdominal tenderness. There is no right CVA tenderness, left CVA tenderness, guarding or rebound.      Hernia: No hernia is present.   Musculoskeletal:         General: No swelling or tenderness. Normal range of motion.      Cervical back: Normal range of motion.   Skin:     General: Skin is warm and dry.   Neurological:      Mental Status: She is alert and oriented to person, place, and time. Mental status is at baseline.   Psychiatric:         Mood and Affect: Mood normal.             ED Course     Labs Reviewed   URINALYSIS WITH CULTURE REFLEX - Abnormal; Notable for the following components:       Result Value    Clarity Urine Turbid (*)     Ketones Urine Trace (*)     Protein Urine 100 (*)     Urobilinogen Urine 2 (*)     RBC Urine 3-5 (*)     Bacteria Urine Rare (*)     Squamous Epi. Cells Few (*)     Hyaline Casts Present (*)     All other components within normal limits   CBC WITH DIFFERENTIAL WITH PLATELET - Abnormal; Notable for the following components:    HGB 11.1 (*)     HCT 34.1 (*)     MCV 76.8 (*)     MCH 25.0 (*)     All other components within normal limits   COMP METABOLIC PANEL (14) - Abnormal; Notable for the following components:    Glucose 117 (*)     Potassium 3.4 (*)     eGFR-Cr 51 (*)     Bilirubin, Total 1.1 (*)     All other components within normal limits   C. DIFFICILE(TOXIGENIC)PCR - Abnormal; Notable for the following components:    C. Difficile Toxin B Gene Positive (*)     All other components within normal limits    Narrative:     Correlate with EIA results to assess patient's likelihood of C. difficile infection versus colonization.    SARS-COV-2/FLU A AND B/RSV BY PCR (GENEXPERT) - Normal     Narrative:     This test is intended for the qualitative detection and differentiation of SARS-CoV-2, influenza A, influenza B, and respiratory syncytial virus (RSV) viral RNA in nasopharyngeal or nares swabs from individuals suspected of respiratory viral infection consistent with COVID-19 by their healthcare provider. Signs and symptoms of respiratory viral infection due to SARS-CoV-2, influenza, and RSV can be similar.    Test performed using the Xpert Xpress SARS-CoV-2/FLU/RSV (real time RT-PCR)  assay on the American Board of Addiction Medicine (ABAM) instrument, [place], [place] [address].   This test is being used under the Food and Drug Administration's Emergency Use Authorization.    The authorized Fact Sheet for Healthcare Providers for this assay is available upon request from the laboratory.   C. DIFF QUICK CHECK BY EIA - Normal   RAINBOW DRAW LAVENDER   RAINBOW DRAW LIGHT GREEN   RAINBOW DRAW BLUE   RAINBOW DRAW GOLD   GI STOOL PANEL BY PCR    Narrative:     The GI Panel tests for Campylobacter species jejuni, coli, and   upsaliensis; all species, subspecies, and serovars of Salmonella;   and Vibrio species parahaemolyticus, vulnificus, and cholera.   It does NOT test for Aeromonas or Edwardsiella species.       EKG    Rate, intervals and axes as noted on EKG Report.  Rate: 66  Rhythm: Sinus Rhythm  Reading: Right bundle branch block present previously now with premature supraventricular                       MDM      The differential includes the following  Gastroenteritis, C. difficile or other bacterial diarrheal infection    Pertinent comorbidities include  As detailed above    Pertinent social history includes  As detailed above    Labs  Potassium 3.4, hemoglobin 11.1 white blood cell count normal    External data reviewed    Discussion of management with external providers    ER course  Patient remained hemodynamically stable awake and alert throughout her ER stay.  Her abdomen was nondistended nontender.  Urine culture will be  sent.  Labs reassuring.  Patient tolerated p.o.  Her daughter who is here and another family member feel comfortable with her being discharged back home as this is also the patient's wishes.  She did not have any episodes of diarrhea while here in the emergency room.  Stool studies have been ordered for outpatient use.    Medical Decision Making      Disposition and Plan     Clinical Impression:  1. Syncope, vasovagal    2. Diarrhea, unspecified type         Disposition:  Discharge  3/31/2025  1:38 pm    Follow-up:  Kyler Washington MD  [address] [place] [id number]  [phone]    Follow up            Medications Prescribed:  Discharge Medication List as of 3/31/2025  1:47 PM              Supplementary Documentation:

## 2025-04-01 ENCOUNTER — LAB ENCOUNTER (OUTPATIENT)
Dept: LAB | Facility: HOSPITAL | Age: OVER 89
End: 2025-04-01
Attending: STUDENT IN AN ORGANIZED HEALTH CARE EDUCATION/TRAINING PROGRAM
Payer: MEDICARE

## 2025-04-01 DIAGNOSIS — R55 SYNCOPE, VASOVAGAL: ICD-10-CM

## 2025-04-01 DIAGNOSIS — R19.7 DIARRHEA, UNSPECIFIED TYPE: ICD-10-CM

## 2025-04-01 LAB
ADENOVIRUS F 40/41 PCR: NEGATIVE
ASTROVIRUS PCR: NEGATIVE
ATRIAL RATE: 66 BPM
C CAYETANENSIS DNA SPEC QL NAA+PROBE: NEGATIVE
C DIFF GDH + TOXINS A+B STL QL IA.RAPID: NOT DETECTED
C DIFF TOX B STL QL: POSITIVE
CAMPY SP DNA.DIARRHEA STL QL NAA+PROBE: NEGATIVE
CRYPTOSP DNA SPEC QL NAA+PROBE: NEGATIVE
EAEC PAA PLAS AGGR+AATA ST NAA+NON-PRB: NEGATIVE
EC STX1+STX2 + H7 FLIC SPEC NAA+PROBE: NEGATIVE
ENTAMOEBA HISTOLYTICA PCR: NEGATIVE
EPEC EAE GENE STL QL NAA+NON-PROBE: NEGATIVE
ETEC LTA+ST1A+ST1B TOX ST NAA+NON-PROBE: NEGATIVE
GIARDIA LAMBLIA PCR: NEGATIVE
NOROVIRUS GI/GII PCR: NEGATIVE
P AXIS: 75 DEGREES
P SHIGELLOIDES DNA STL QL NAA+PROBE: NEGATIVE
P-R INTERVAL: 280 MS
Q-T INTERVAL: 458 MS
QRS DURATION: 132 MS
QTC CALCULATION (BEZET): 480 MS
R AXIS: 74 DEGREES
ROTAVIRUS A PCR: NEGATIVE
SALMONELLA DNA SPEC QL NAA+PROBE: NEGATIVE
SAPOVIRUS PCR: NEGATIVE
SHIGELLA SP+EIEC IPAH ST NAA+NON-PROBE: NEGATIVE
T AXIS: 5 DEGREES
V CHOLERAE DNA SPEC QL NAA+PROBE: NEGATIVE
VENTRICULAR RATE: 66 BPM
VIBRIO DNA SPEC NAA+PROBE: NEGATIVE
YERSINIA DNA SPEC NAA+PROBE: NEGATIVE

## 2025-04-01 PROCEDURE — 87507 IADNA-DNA/RNA PROBE TQ 12-25: CPT | Performed by: STUDENT IN AN ORGANIZED HEALTH CARE EDUCATION/TRAINING PROGRAM

## 2025-04-01 PROCEDURE — 87045 FECES CULTURE AEROBIC BACT: CPT

## 2025-04-01 PROCEDURE — 87427 SHIGA-LIKE TOXIN AG IA: CPT

## 2025-04-01 PROCEDURE — 87493 C DIFF AMPLIFIED PROBE: CPT

## 2025-04-01 PROCEDURE — 82272 OCCULT BLD FECES 1-3 TESTS: CPT

## 2025-04-01 PROCEDURE — 87046 STOOL CULTR AEROBIC BACT EA: CPT

## 2025-04-01 PROCEDURE — 87015 SPECIMEN INFECT AGNT CONCNTJ: CPT

## 2025-04-01 PROCEDURE — 87493 C DIFF AMPLIFIED PROBE: CPT | Performed by: STUDENT IN AN ORGANIZED HEALTH CARE EDUCATION/TRAINING PROGRAM

## 2025-04-02 LAB — C DIFF TOX B STL QL: NEGATIVE

## (undated) NOTE — IP AVS SNAPSHOT
[facility] [place]  [address] [place], [address] ~ [phone]                Discharge Summary   4/5/2017    Lily Figueroa           Admission Information        Provider Department    4/5/2017 Becky Boxer, MD  [id number]         Thank yo Last time this was given:  20 mg on 4/6/2017  9:41 AM   Commonly known as:  PRINIVIL,ZESTRIL        Take 20 mg by mouth daily. omeprazole 20 MG Cpdr   Commonly known as:  PRILOSEC        Take 1 capsule by mouth daily.     Nadeem Yañez 2.8 (04/05/17)  0.3 -- (04/05/17)  3.84 (04/05/17)  2.10 (04/05/17)  0.52 (04/05/17)  0.19 (04/05/17)  4.89      Metabolic Lab Results  (Last result in the past 90 days)    HgbA1C Glucose BUN Creatinine Calcium Alkaline Phosph AST    -- (04/05/17)  151 (H) Call [phone] for help. [facility] is NOT to be used for urgent needs.   For medical emergencies, dial 911.             _____________________________________________________________________________    Medication Side Effects - Medications to be taken at Albuterol Sulfate HFA (PROAIR HFA) 108 (90 BASE) MCG/ACT Inhalation Aero Soln       Use:  Treatment of asthma, COPD/emphysema, cough, allergies   Most common side effects: Headache, nasal irritation, palpitations, increased heart rate, increased blood pre

## (undated) NOTE — ED AVS SNAPSHOT
Aury Gary   MRN: [id number]    Department:  [facility] Emergency Department   Date of Visit:  8/31/2019           Disclosure     Insurance plans vary and the physician(s) referred by the ER may not be covered by your plan.  Please contact your in tell this physician (or your personal doctor if your instructions are to return to your personal doctor) about any new or lasting problems. The primary care or specialist physician will see patients referred from the [facility] Emergency Department.  Tatyana Ramsey

## (undated) NOTE — ED AVS SNAPSHOT
Javier Bello   MRN: [id number]    Department:  [facility] Emergency Department   Date of Visit:  7/31/2019           Disclosure     Insurance plans vary and the physician(s) referred by the ER may not be covered by your plan.  Please contact your in tell this physician (or your personal doctor if your instructions are to return to your personal doctor) about any new or lasting problems. The primary care or specialist physician will see patients referred from the [facility] Emergency Department.  Severo Pastures

## (undated) NOTE — ED AVS SNAPSHOT
Vitor Wellington   MRN: [id number]    Department:  [facility] Emergency Department   Date of Visit:  7/4/2019           Disclosure     Insurance plans vary and the physician(s) referred by the ER may not be covered by your plan.  Please contact your ins tell this physician (or your personal doctor if your instructions are to return to your personal doctor) about any new or lasting problems. The primary care or specialist physician will see patients referred from the [facility] Emergency Department.  Yamilka Lozano

## (undated) NOTE — IP AVS SNAPSHOT
Patient Demographics     Address Phone E-mail Address    [address] [place] [address] [phone] (Home)  [phone] (Mobile) *Preferred* [email or web address]      Emergency Contact(s)     Name Relation Home Work Mobile    Jacky Power Daughter [phone]- omeprazole 20 MG Cpdr   Commonly known as:  PRILOSEC        Take 1 capsule by mouth daily.     Aleydaneha Cameron                           ZYRTEC ALLERGY 10 MG Tabs   Last time this was given:  5 mg on 4/6/2017  9:41 AM   Generic drug:  cetiriz Ordering provider: David Lloyd MD  04/05/17 1621 Resulting lab: [facility]    Specimen Information    Type Source Collected On   Urine  04/05/17 4475          Components       Value Reference Range Flag Lab   Urine Color Yellow Yellow   [facility]   Cla :  See Garcia MD (Physician)           .   CC: Patient presents with:  Syncope (cardiovascular, neurologic)       PCP: Daniel Goetz MD    History of Present Illness: Patient is a 80year old female with PMH sig for HTN who presented with asymptomatic   • Helicobacter pylori (H. pylori) 1998     treated   • Vitamin D deficiency 2/21/2010        PSH      Past Surgical History    UPPER GI ENDOSCOPY,DIAGNOSIS  12/01    Comment wnl    COLONOSCOPY,DIAGNOSTIC  12/01    Comment wnl    INJECTION, Lab  04/05/17   1115   NA  140   K  3.3*   CL  103   CO2  30.0   BUN  19   CREATSERUM  1.00   GLU  151*   CA  8.9   MG  1.9       Recent Labs   Lab  04/05/17   1115   ALT  18   AST  15   ALB  3.3*       Recent Labs   Lab  04/05/17   1115   TROP  0.049* Consults by Beth Garcia MD at 4/5/2017  4:56 PM     Author:  Beth Garcia MD Service:  (none) Author Type:  Physician    Filed:  4/5/2017  5:12 PM Note Time:  4/5/2017  4:56 PM Status:  Signed    :  Beth Garcia MD (Physician CARDIOVASCULAR: S1, S2 normal, RRR; no S3, no S4; no click; no murmur  ABDOMEN: normal active BS, soft, nondistended; nontender  EXTREMITIES: no cyanosis, clubbing or edema, peripheral pulses intact  NEURO: no sensorimotor deficits  PSYCHIATRIC: alert and fosamax [phone] (SE)   • Irritable bowel syndrome 1990's     longstanding chronic abdominal pain   • Esophageal reflux      12/01: EGD   • Urge incontinence    • Benign neoplasm of kidney, except pelvis      no change 11/01 to 11/02   • Other and unspe Upper extremity ROM and strength are within functional limits    Lower extremity ROM is within functional limits     Lower extremity strength is within functional limits     NEUROLOGICAL FINDINGS           Coordination - Rapid Alternating Movement: Symmetr fall risk factors, importance of regular physical activity. Patient End of Session: In bed;Needs met;Call light within reach;RN aware of session/findings; All patient questions and concerns addressed; Family present    ASSESSMENT   Patient is a 80 year Pneumococcal (Prevnar 7) 01/01/02     Pneumovax 23 (Lot Mgr) 10/08/14       Future Appointments        Provider Tala Spence    5/3/2017 8:30 AM Marsha Sifuentes MD [facility] INTERNAL MEDICINE - [facility]